# Patient Record
Sex: MALE | Race: WHITE | NOT HISPANIC OR LATINO | Employment: OTHER | URBAN - METROPOLITAN AREA
[De-identification: names, ages, dates, MRNs, and addresses within clinical notes are randomized per-mention and may not be internally consistent; named-entity substitution may affect disease eponyms.]

---

## 2018-10-09 ENCOUNTER — OFFICE VISIT (OUTPATIENT)
Dept: ORTHOPEDIC SURGERY | Facility: CLINIC | Age: 73
End: 2018-10-09

## 2018-10-09 VITALS — WEIGHT: 247 LBS | HEIGHT: 73 IN | TEMPERATURE: 97.7 F | BODY MASS INDEX: 32.74 KG/M2

## 2018-10-09 DIAGNOSIS — M17.12 PRIMARY OSTEOARTHRITIS OF LEFT KNEE: Primary | ICD-10-CM

## 2018-10-09 PROCEDURE — 99204 OFFICE O/P NEW MOD 45 MIN: CPT | Performed by: ORTHOPAEDIC SURGERY

## 2018-10-09 RX ORDER — AMLODIPINE BESYLATE 10 MG/1
TABLET ORAL
COMMUNITY
End: 2018-10-12

## 2018-10-09 RX ORDER — MELOXICAM 15 MG/1
15 TABLET ORAL ONCE
Status: CANCELLED | OUTPATIENT
Start: 2018-10-17 | End: 2018-10-17

## 2018-10-09 RX ORDER — HYDRALAZINE HYDROCHLORIDE 50 MG/1
50 TABLET, FILM COATED ORAL EVERY MORNING
COMMUNITY
End: 2021-01-29 | Stop reason: DRUGHIGH

## 2018-10-09 RX ORDER — ALLOPURINOL 100 MG/1
TABLET ORAL
COMMUNITY
End: 2018-10-12

## 2018-10-09 RX ORDER — INDOMETHACIN 75 MG/1
75 CAPSULE, EXTENDED RELEASE ORAL 2 TIMES DAILY WITH MEALS
COMMUNITY
End: 2018-10-18 | Stop reason: HOSPADM

## 2018-10-09 RX ORDER — CARVEDILOL 12.5 MG/1
12.5 TABLET ORAL EVERY 12 HOURS SCHEDULED
COMMUNITY
End: 2021-01-29

## 2018-10-09 RX ORDER — PREGABALIN 75 MG/1
150 CAPSULE ORAL ONCE
Status: CANCELLED | OUTPATIENT
Start: 2018-10-17 | End: 2018-10-17

## 2018-10-09 NOTE — PROGRESS NOTES
"Patient: Darwin Sanchez  YOB: 1945 72 y.o. male  Medical Record Number: [id number]    Chief Complaints:   Chief Complaint   Patient presents with   • Right Knee - Establish Care   • Left Knee - Establish Care       History of Present Illness:Darwin Sanchez is a 72 y.o. male who presents with bilateral knee pain which is severe and constant in nature.  He describes a stabbing aching pain on the medial side of both knees.  This is progressed markedly over the last 6 months.  He has seen multiple orthopedic doctors in [facility] and [address] [place] [address].  He has had injections, physical therapy, anti-inflammatories without relief of his symptoms.  He is wanting to proceed with knee replacement surgery on the right side given his limitations of activities of daily living.    Allergies: No Known Allergies    Medications:   Current Outpatient Prescriptions   Medication Sig Dispense Refill   • allopurinol (ZYLOPRIM) 100 MG tablet allopurinol 100 mg tablet   take 2 tablets by mouth once daily     • amLODIPine (NORVASC) 10 MG tablet amlodipine 10 mg tablet     • carvedilol (COREG) 12.5 MG tablet Every 12 (Twelve) Hours.     • hydrALAZINE (APRESOLINE) 50 MG tablet Every 12 (Twelve) Hours.     • indomethacin SR (INDOCIN SR) 75 MG CR capsule Take 75 mg by mouth 2 (Two) Times a Day With Meals.       No current facility-administered medications for this visit.          The following portions of the patient's history were reviewed and updated as appropriate: allergies, current medications, past family history, past medical history, past social history, past surgical history and problem list.    Review of Systems:   A 14 point review of systems was performed. All systems negative except pertinent positives/negative listed in HPI above    Physical Exam:   Vitals:    10/09/18 1607   Temp: 97.7 °F (36.5 °C)   TempSrc: Temporal Artery    Weight: 112 kg (247 lb)   Height: 185.4 cm (73\")       General: A and O x 3, ASA, NAD    SCLERA:    " Normal    DENTITION:   Normal   Knee:  bilateral    ALIGNMENT:     Varus  ,   Patella  tracks  midline    GAIT:    Antalgic    SKIN:    No abnormality    RANGE OF MOTION:   5  -  120   DEG    STRENGTH:   4  / 5    LIGAMENTS:    No varus / valgus instability.   Negative  Lachman.    MENISCUS:     Negative   Odell       DISTAL PULSES:    Paplable    DISTAL SENSATION :   Intact    LYMPHATICS:     No   lymphadenopathy    OTHER:          - Positive   effusion      - Crepitance with ROM         Radiology:  Xrays 3views  Both knees (ap,lateral, sunrise) recently taken at outside institution were reviewed demonstrating advanced varus osteoarthritis with bone on bone articulation, subchondral cysts, and periarticular osteophytes    Assessment/Plan: Right greater than left advanced end-stage osteoarthritis with failure of the full complement of conservative measures.  Continuation of conservative management vs. TKA discussed.  The patient wishes to proceed with total knee replacement.  At this point the patient has failed the full compliment of conservative treatment and stating complete understanding of the risks/benefits/ anternatives wishes to proceed with surgical treatment.    Risk and benefits of surgery were reviewed.  Including, but not limited to, blood clots or pulmonary embolism, anesthesia risk, infection, fracture, skin/leg numbness, persistent pain/crepitance/popping/catching, failure of the implant, need for future surgeries, hematoma, possible nerve or blood vessel injury, need for transfusion, and potential risk of stroke,heart attack or death, among others.  The patient understands and wishes to proceed.     It was explained that if tissue has been repaired or reconstructed, there is also an increased chance of failure which may require further management.  Following the completion of the discussion, the patient expressed understanding of this planned course of care, all their questions were answered and  consent will be obtained preoperatively.    Operative Plan: right [employer] and Nephew Oxinium Total Knee Replacement an overnight staywith home health rehab        Cooper Ruby MD  10/9/2018

## 2018-10-10 PROBLEM — M17.12 PRIMARY OSTEOARTHRITIS OF LEFT KNEE: Status: ACTIVE | Noted: 2018-10-10

## 2018-10-12 ENCOUNTER — APPOINTMENT (OUTPATIENT)
Dept: PREADMISSION TESTING | Facility: HOSPITAL | Age: 73
End: 2018-10-12

## 2018-10-12 VITALS
RESPIRATION RATE: 16 BRPM | BODY MASS INDEX: 33.91 KG/M2 | HEIGHT: 72 IN | TEMPERATURE: 98.1 F | WEIGHT: 250.38 LBS | HEART RATE: 59 BPM | OXYGEN SATURATION: 96 % | DIASTOLIC BLOOD PRESSURE: 79 MMHG | SYSTOLIC BLOOD PRESSURE: 133 MMHG

## 2018-10-12 DIAGNOSIS — M17.12 PRIMARY OSTEOARTHRITIS OF LEFT KNEE: ICD-10-CM

## 2018-10-12 LAB
ANION GAP SERPL CALCULATED.3IONS-SCNC: 12.6 MMOL/L
BILIRUB UR QL STRIP: NEGATIVE
BUN BLD-MCNC: 24 MG/DL (ref 8–23)
BUN/CREAT SERPL: 21.8 (ref 7–25)
CALCIUM SPEC-SCNC: 9.3 MG/DL (ref 8.6–10.5)
CHLORIDE SERPL-SCNC: 108 MMOL/L (ref 98–107)
CLARITY UR: CLEAR
CO2 SERPL-SCNC: 23.4 MMOL/L (ref 22–29)
COLOR UR: YELLOW
CREAT BLD-MCNC: 1.1 MG/DL (ref 0.76–1.27)
DEPRECATED RDW RBC AUTO: 43.5 FL (ref 37–54)
ERYTHROCYTE [DISTWIDTH] IN BLOOD BY AUTOMATED COUNT: 13.1 % (ref 11.5–14.5)
GFR SERPL CREATININE-BSD FRML MDRD: 66 ML/MIN/1.73
GLUCOSE BLD-MCNC: 132 MG/DL (ref 65–99)
GLUCOSE UR STRIP-MCNC: NEGATIVE MG/DL
HCT VFR BLD AUTO: 41.7 % (ref 40.4–52.2)
HGB BLD-MCNC: 12.8 G/DL (ref 13.7–17.6)
HGB UR QL STRIP.AUTO: NEGATIVE
KETONES UR QL STRIP: NEGATIVE
LEUKOCYTE ESTERASE UR QL STRIP.AUTO: NEGATIVE
MCH RBC QN AUTO: 28.1 PG (ref 27–32.7)
MCHC RBC AUTO-ENTMCNC: 30.7 G/DL (ref 32.6–36.4)
MCV RBC AUTO: 91.4 FL (ref 79.8–96.2)
NITRITE UR QL STRIP: NEGATIVE
PH UR STRIP.AUTO: 5.5 [PH] (ref 5–8)
PLATELET # BLD AUTO: 164 10*3/MM3 (ref 140–500)
PMV BLD AUTO: 11.6 FL (ref 6–12)
POTASSIUM BLD-SCNC: 3.9 MMOL/L (ref 3.5–5.2)
PROT UR QL STRIP: NEGATIVE
RBC # BLD AUTO: 4.56 10*6/MM3 (ref 4.6–6)
SODIUM BLD-SCNC: 144 MMOL/L (ref 136–145)
SP GR UR STRIP: 1.02 (ref 1–1.03)
UROBILINOGEN UR QL STRIP: NORMAL
WBC NRBC COR # BLD: 6.07 10*3/MM3 (ref 4.5–10.7)

## 2018-10-12 PROCEDURE — 80048 BASIC METABOLIC PNL TOTAL CA: CPT | Performed by: ORTHOPAEDIC SURGERY

## 2018-10-12 PROCEDURE — 36415 COLL VENOUS BLD VENIPUNCTURE: CPT

## 2018-10-12 PROCEDURE — 93010 ELECTROCARDIOGRAM REPORT: CPT | Performed by: INTERNAL MEDICINE

## 2018-10-12 PROCEDURE — 85027 COMPLETE CBC AUTOMATED: CPT | Performed by: ORTHOPAEDIC SURGERY

## 2018-10-12 PROCEDURE — 81003 URINALYSIS AUTO W/O SCOPE: CPT | Performed by: ORTHOPAEDIC SURGERY

## 2018-10-12 PROCEDURE — 93005 ELECTROCARDIOGRAM TRACING: CPT

## 2018-10-12 RX ORDER — ALLOPURINOL 100 MG/1
100 TABLET ORAL DAILY PRN
COMMUNITY

## 2018-10-12 RX ORDER — AMLODIPINE BESYLATE 10 MG/1
10 TABLET ORAL DAILY
Status: ON HOLD | COMMUNITY
End: 2018-10-17

## 2018-10-12 ASSESSMENT — KOOS JR
KOOS JR SCORE: 16
KOOS JR SCORE: 47.487

## 2018-10-12 NOTE — DISCHARGE INSTRUCTIONS
Take the following medications the morning of surgery with a small sip of water:    AMLODIPINE  CARVEDILOL  HYDRALAZINE    General Instructions:  • Do not eat solid food after midnight the night before surgery.  • You may drink clear liquids day of surgery but must stop at least one hour before your hospital arrival time @ 0800 AM STOP DRINKING!!!  • It is beneficial for you to have a clear drink that contains carbohydrates the day of surgery.  We suggest a 12 to 20 ounce bottle of Gatorade or Powerade for non-diabetic patients or a 12 to 20 ounce bottle of G2 or Powerade Zero for diabetic patients. (Pediatric patients, are not advised to drink a 12 to 20 ounce carbohydrate drink)    Clear liquids are liquids you can see through.  Nothing red in color.     Plain water                               Sports drinks  Sodas                                   Gelatin (Jell-O)  Fruit juices without pulp such as white grape juice and apple juice  Popsicles that contain no fruit or yogurt  Tea or coffee (no cream or milk added)  Gatorade / Powerade  G2 / Powerade Zero    • Patients who avoid smoking, chewing tobacco and alcohol for 4 weeks prior to surgery have a reduced risk of post-operative complications.  Quit smoking as many days before surgery as you can.  • Do not smoke, use chewing tobacco or drink alcohol the day of surgery.   • If applicable bring your C-PAP/ BI-PAP machine.  • Bring any papers given to you in the doctor’s office.  • Wear clean comfortable clothes and socks.  • Do not wear contact lenses or make-up.  Bring a case for your glasses.   • Bring crutches or walker if applicable.  • Remove all piercings.  Leave jewelry and any other valuables at home.  • Hair extensions with metal clips must be removed prior to surgery.  • The Pre-Admission Testing nurse will instruct you to bring medications if unable to obtain an accurate list in Pre-Admission Testing.        Preventing a Surgical Site Infection:  • For  2 to 3 days before surgery, avoid shaving with a razor because the razor can irritate skin and make it easier to develop an infection.    • Any areas of open skin can increase the risk of a post-operative wound infection by allowing bacteria to enter and travel throughout the body.  Notify your surgeon if you have any skin wounds / rashes even if it is not near the expected surgical site.  The area will need assessed to determine if surgery should be delayed until it is healed.  • The night prior to surgery sleep in a clean bed with clean clothing.  Do not allow pets to sleep with you.  • Shower on the morning of surgery using a fresh bar of anti-bacterial soap (such as Dial) and clean washcloth.  Dry with a clean towel and dress in clean clothing.  • Ask your surgeon if you will be receiving antibiotics prior to surgery.  • Make sure you, your family, and all healthcare providers clean their hands with soap and water or an alcohol based hand  before caring for you or your wound.    Day of surgery:10- ARRIVE @ 0900 REPORT TO THE MAIN OR   Upon arrival, a Pre-op nurse and Anesthesiologist will review your health history, obtain vital signs, and answer questions you may have.  The only belongings needed at this time will be your home medications and if applicable your C-PAP/BI-PAP machine.  If you are staying overnight your family can leave the rest of your belongings in the car and bring them to your room later.  A Pre-op nurse will start an IV and you may receive medication in preparation for surgery, including something to help you relax.  Your family will be able to see you in the Pre-op area.  While you are in surgery your family should notify the waiting room  if they leave the waiting room area and provide a contact phone number.    Please be aware that surgery does come with discomfort.  We want to make every effort to control your discomfort so please discuss any uncontrolled  symptoms with your nurse.   Your doctor will most likely have prescribed pain medications.      If you are going home after surgery you will receive individualized written care instructions before being discharged.  A responsible adult must drive you to and from the hospital on the day of your surgery and stay with you for 24 hours.    If you are staying overnight following surgery, you will be transported to your hospital room following the recovery period.  [facility] has all private rooms.    You have received a list of surgical assistants for your reference.  If you have any questions please call Pre-Admission Testing at [phone].  Deductibles and co-payments are collected on the day of service. Please be prepared to pay the required co-pay, deductible or deposit on the day of service as defined by your plan.    2% CHLORAHEXIDINE GLUCONATE* CLOTH  Preparing or “prepping” skin before surgery can reduce the risk of infection at the surgical site. To make the process easier, [facility] has chosen disposable cloths moistened with a rinse-free, 2% Chlorhexidine Gluconate (CHG) antiseptic solution. The steps below outline the prepping process and should be carefully followed.        Use the prep cloth on the area that is circled in the diagram             Directions Night before Surgery 10- PM PRIOR TO SURGERY (RIGHT LEG)  1) Shower using a fresh bar of anti-bacterial soap (such as Dial) and clean washcloth.  Use a clean towel to completely dry your skin.  2) Do not use any lotions, oils or creams on your skin.  3) Open the package and remove 1 cloth, wipe your skin for 30 seconds in a circular motion.  Allow to dry for 3 minutes.  4) Repeat #3 with second cloth.  5) Do not touch your eyes, ears, or mouth with the prep cloth.  6) Allow the wet prep solution to air dry.  7) Discard the prep cloth and wash your hands with soap and water.   8) Dress in clean bed clothes and sleep on  fresh clean bed sheets.   9) You may experience some temporary itching after the prep.    Directions Day of [id number]- AM PRIOR TO SURGERY (RIGHT LEG)  1) Repeat steps 1,2,3,4,5,6,7, and 9.   2) Dress in clean clothes before coming to the hospital.    BACTROBAN NASAL OINTMENT  There are many germs normally in your nose. Bactroban is an ointment that will help reduce these germs. Please follow these instructions for Bactroban use:      ____The day before surgery in the morning  [id number]_______    ____The day before surgery in the evening              [id number]_______    ____The day of surgery in the morning    [id number]_______    **Squirt ½ package of Bactroban Ointment onto a cotton applicator and apply to inside of 1st nostril.  Squirt the remaining Bactroban and apply to the inside of the other nostril.

## 2018-10-16 ENCOUNTER — OFFICE VISIT (OUTPATIENT)
Dept: ORTHOPEDIC SURGERY | Facility: CLINIC | Age: 73
End: 2018-10-16

## 2018-10-16 VITALS — BODY MASS INDEX: 33.86 KG/M2 | HEIGHT: 72 IN | WEIGHT: 250 LBS

## 2018-10-16 DIAGNOSIS — M17.11 PRIMARY OSTEOARTHRITIS OF RIGHT KNEE: Primary | ICD-10-CM

## 2018-10-16 PROCEDURE — 73562 X-RAY EXAM OF KNEE 3: CPT | Performed by: NURSE PRACTITIONER

## 2018-10-16 PROCEDURE — S0260 H&P FOR SURGERY: HCPCS | Performed by: NURSE PRACTITIONER

## 2018-10-16 RX ORDER — ESZOPICLONE 1 MG/1
5 TABLET, FILM COATED ORAL NIGHTLY
COMMUNITY
End: 2021-02-11

## 2018-10-16 RX ORDER — LOSARTAN POTASSIUM AND HYDROCHLOROTHIAZIDE 12.5; 5 MG/1; MG/1
1 TABLET ORAL DAILY
COMMUNITY
End: 2021-02-11

## 2018-10-16 RX ORDER — CLOBETASOL PROPIONATE 0.5 MG/G
CREAM TOPICAL
COMMUNITY
End: 2018-10-18 | Stop reason: HOSPADM

## 2018-10-16 NOTE — H&P
"Patient: Darwin Sanchez    Date of Admission: 10/17/18    YOB: 1945    Medical Record Number: [id number]    Admitting Physician: Dr. Cooper Ruby    Reason for Admission: End Stage Right Knee OA    History of Present Illness: 72 y.o. male presents with severe end stage knee osteoarthritis which has not been responsive to the full compliment of conservative measures. Despite conservative attempts, there is still severe, constant activity limiting pain. Given the severity of the pain, the patient has elected to proceed with knee replacement.    Allergies: No Known Allergies      Current Medications:  Scheduled Meds:  PRN Meds:.    PMH:     Past Medical History:   Diagnosis Date   • Arthritis     OSTEO   • Cancer (CMS/HCC)     SQUAMOUS SKIN CANCER DIETER RECTAL  AREA   • Gout    • Hypertension        PF/Surg/Soc Hx:     Past Surgical History:   Procedure Laterality Date   • COLONOSCOPY     • KNEE ARTHROSCOPY Bilateral    • SQUAMOUS CELL CARCINOMA EXCISION      DIETER RECTAL AREA        Social History     Occupational History   • Not on file.     Social History Main Topics   • Smoking status: Never Smoker   • Smokeless tobacco: Never Used   • Alcohol use No   • Drug use: No   • Sexual activity: Not on file      Social History     Social History Narrative   • No narrative on file        Family History   Problem Relation Age of Onset   • Malig Hyperthermia Neg Hx          Review of Systems:   A 14 point review of systems was performed, pertinent positives discussed above, all other systems are negative    Physical Exam: 72 y.o. male  Vital Signs :   Vitals:    10/16/18 1403   Weight: 113 kg (250 lb)   Height: 182.9 cm (72\")     General: Alert and Oriented x 3, No acute distress.  Psych: mood and affect appropriate; recent and remote memory intact  Eyes: conjunctiva clear; pupils equally round and reactive, sclera nonicteric  CV: no peripheral edema  Resp: normal respiratory effort  Skin: no rashes or wounds; " normal turgor    Xrays:  -3 views (AP, lateral, and sunrise) were reviewed demonstrating end-stage OA with bone on bone articulation.  -A full length AP xray was ordered today for purposes of operative alignment demonstrating end stage arthritic findings. There are no previous full length films for review    Assessment:  End-stage Right knee osteoarthritis. Conservative measures have failed.      Plan:  The plan is to proceed with Right Total Knee Replacement. The patient voiced understanding of the risks, benefits, and alternative forms of treatment that were discussed with Dr Ruby at the time of scheduling. Patient is planning on going home with home health next day    Nova Locke, APRN  10/16/2018

## 2018-10-17 ENCOUNTER — ANESTHESIA EVENT (OUTPATIENT)
Dept: PERIOP | Facility: HOSPITAL | Age: 73
End: 2018-10-17

## 2018-10-17 ENCOUNTER — ANESTHESIA (OUTPATIENT)
Dept: PERIOP | Facility: HOSPITAL | Age: 73
End: 2018-10-17

## 2018-10-17 ENCOUNTER — HOSPITAL ENCOUNTER (OUTPATIENT)
Facility: HOSPITAL | Age: 73
Discharge: HOME HEALTH | End: 2018-10-18
Attending: ORTHOPAEDIC SURGERY | Admitting: ORTHOPAEDIC SURGERY

## 2018-10-17 ENCOUNTER — APPOINTMENT (OUTPATIENT)
Dept: GENERAL RADIOLOGY | Facility: HOSPITAL | Age: 73
End: 2018-10-17

## 2018-10-17 DIAGNOSIS — Z74.09 IMPAIRED FUNCTIONAL MOBILITY AND ACTIVITY TOLERANCE: Primary | ICD-10-CM

## 2018-10-17 DIAGNOSIS — M17.12 PRIMARY OSTEOARTHRITIS OF LEFT KNEE: ICD-10-CM

## 2018-10-17 PROBLEM — M17.9 OA (OSTEOARTHRITIS) OF KNEE: Status: ACTIVE | Noted: 2018-10-17

## 2018-10-17 PROCEDURE — G0378 HOSPITAL OBSERVATION PER HR: HCPCS

## 2018-10-17 PROCEDURE — 25010000003 CEFAZOLIN IN DEXTROSE 2-4 GM/100ML-% SOLUTION: Performed by: ORTHOPAEDIC SURGERY

## 2018-10-17 PROCEDURE — 25010000002 VANCOMYCIN 10 G RECONSTITUTED SOLUTION: Performed by: ORTHOPAEDIC SURGERY

## 2018-10-17 PROCEDURE — 25010000002 ONDANSETRON PER 1 MG: Performed by: ANESTHESIOLOGY

## 2018-10-17 PROCEDURE — 27447 TOTAL KNEE ARTHROPLASTY: CPT | Performed by: ORTHOPAEDIC SURGERY

## 2018-10-17 PROCEDURE — C1776 JOINT DEVICE (IMPLANTABLE): HCPCS | Performed by: ORTHOPAEDIC SURGERY

## 2018-10-17 PROCEDURE — 25010000002 FENTANYL CITRATE (PF) 100 MCG/2ML SOLUTION: Performed by: ANESTHESIOLOGY

## 2018-10-17 PROCEDURE — 25010000002 MIDAZOLAM PER 1 MG: Performed by: ANESTHESIOLOGY

## 2018-10-17 PROCEDURE — 97110 THERAPEUTIC EXERCISES: CPT

## 2018-10-17 PROCEDURE — 97162 PT EVAL MOD COMPLEX 30 MIN: CPT

## 2018-10-17 PROCEDURE — C1713 ANCHOR/SCREW BN/BN,TIS/BN: HCPCS | Performed by: ORTHOPAEDIC SURGERY

## 2018-10-17 PROCEDURE — 25010000002 PROPOFOL 10 MG/ML EMULSION: Performed by: ANESTHESIOLOGY

## 2018-10-17 PROCEDURE — 25010000002 DEXAMETHASONE PER 1 MG: Performed by: ANESTHESIOLOGY

## 2018-10-17 PROCEDURE — 73560 X-RAY EXAM OF KNEE 1 OR 2: CPT

## 2018-10-17 PROCEDURE — 25010000002 NEOSTIGMINE PER 0.5 MG: Performed by: ANESTHESIOLOGY

## 2018-10-17 PROCEDURE — 25010000002 ROPIVACAINE PER 1 MG: Performed by: ANESTHESIOLOGY

## 2018-10-17 DEVICE — LEGION CRUCIATE RETAINING OXINIUM                                    FEMORAL SIZE 7 RIGHT
Type: IMPLANTABLE DEVICE | Site: KNEE | Status: FUNCTIONAL
Brand: LEGION

## 2018-10-17 DEVICE — IMPLANTABLE DEVICE: Type: IMPLANTABLE DEVICE | Site: KNEE | Status: FUNCTIONAL

## 2018-10-17 DEVICE — GENESIS II NON-POROUS TIBIAL                                    BASEPLATE SIZE 7 RIGHT
Type: IMPLANTABLE DEVICE | Site: KNEE | Status: FUNCTIONAL
Brand: GENESIS II

## 2018-10-17 DEVICE — GENESIS II CRUCIATE RETAINING DEEP                                    FLEXION INSERT SIZE 7-8 11MM
Type: IMPLANTABLE DEVICE | Site: KNEE | Status: FUNCTIONAL
Brand: GENESIS II

## 2018-10-17 DEVICE — CMT BONE PALACOS R HI/VISC 1X40: Type: IMPLANTABLE DEVICE | Site: KNEE | Status: FUNCTIONAL

## 2018-10-17 DEVICE — GENESIS II BICONVEX PATELLA 32MM
Type: IMPLANTABLE DEVICE | Site: KNEE | Status: FUNCTIONAL
Brand: GENESIS II

## 2018-10-17 RX ORDER — DOCUSATE SODIUM 100 MG/1
100 CAPSULE, LIQUID FILLED ORAL 2 TIMES DAILY
Status: DISCONTINUED | OUTPATIENT
Start: 2018-10-17 | End: 2018-10-18 | Stop reason: HOSPADM

## 2018-10-17 RX ORDER — OXYCODONE AND ACETAMINOPHEN 7.5; 325 MG/1; MG/1
1 TABLET ORAL ONCE AS NEEDED
Status: DISCONTINUED | OUTPATIENT
Start: 2018-10-17 | End: 2018-10-17 | Stop reason: HOSPADM

## 2018-10-17 RX ORDER — PROMETHAZINE HYDROCHLORIDE 25 MG/1
25 SUPPOSITORY RECTAL ONCE AS NEEDED
Status: DISCONTINUED | OUTPATIENT
Start: 2018-10-17 | End: 2018-10-17 | Stop reason: HOSPADM

## 2018-10-17 RX ORDER — SODIUM CHLORIDE 0.9 % (FLUSH) 0.9 %
1-10 SYRINGE (ML) INJECTION AS NEEDED
Status: DISCONTINUED | OUTPATIENT
Start: 2018-10-17 | End: 2018-10-17 | Stop reason: HOSPADM

## 2018-10-17 RX ORDER — BISACODYL 10 MG
10 SUPPOSITORY, RECTAL RECTAL DAILY PRN
Status: DISCONTINUED | OUTPATIENT
Start: 2018-10-17 | End: 2018-10-18 | Stop reason: HOSPADM

## 2018-10-17 RX ORDER — PROPOFOL 10 MG/ML
VIAL (ML) INTRAVENOUS AS NEEDED
Status: DISCONTINUED | OUTPATIENT
Start: 2018-10-17 | End: 2018-10-17 | Stop reason: SURG

## 2018-10-17 RX ORDER — NALOXONE HCL 0.4 MG/ML
0.2 VIAL (ML) INJECTION AS NEEDED
Status: DISCONTINUED | OUTPATIENT
Start: 2018-10-17 | End: 2018-10-17 | Stop reason: HOSPADM

## 2018-10-17 RX ORDER — PROMETHAZINE HYDROCHLORIDE 25 MG/ML
12.5 INJECTION, SOLUTION INTRAMUSCULAR; INTRAVENOUS ONCE AS NEEDED
Status: DISCONTINUED | OUTPATIENT
Start: 2018-10-17 | End: 2018-10-17 | Stop reason: HOSPADM

## 2018-10-17 RX ORDER — CEFAZOLIN SODIUM 2 G/100ML
2 INJECTION, SOLUTION INTRAVENOUS ONCE
Status: COMPLETED | OUTPATIENT
Start: 2018-10-17 | End: 2018-10-17

## 2018-10-17 RX ORDER — ACETAMINOPHEN 10 MG/ML
1000 INJECTION, SOLUTION INTRAVENOUS ONCE
Status: COMPLETED | OUTPATIENT
Start: 2018-10-17 | End: 2018-10-17

## 2018-10-17 RX ORDER — PROMETHAZINE HYDROCHLORIDE 25 MG/1
12.5 TABLET ORAL ONCE AS NEEDED
Status: DISCONTINUED | OUTPATIENT
Start: 2018-10-17 | End: 2018-10-17 | Stop reason: HOSPADM

## 2018-10-17 RX ORDER — HYDROCODONE BITARTRATE AND ACETAMINOPHEN 7.5; 325 MG/1; MG/1
1 TABLET ORAL ONCE AS NEEDED
Status: DISCONTINUED | OUTPATIENT
Start: 2018-10-17 | End: 2018-10-17 | Stop reason: HOSPADM

## 2018-10-17 RX ORDER — EPHEDRINE SULFATE 50 MG/ML
INJECTION, SOLUTION INTRAVENOUS AS NEEDED
Status: DISCONTINUED | OUTPATIENT
Start: 2018-10-17 | End: 2018-10-17 | Stop reason: SURG

## 2018-10-17 RX ORDER — HYDROCODONE BITARTRATE AND ACETAMINOPHEN 7.5; 325 MG/1; MG/1
1 TABLET ORAL EVERY 4 HOURS PRN
Status: DISCONTINUED | OUTPATIENT
Start: 2018-10-17 | End: 2018-10-18 | Stop reason: HOSPADM

## 2018-10-17 RX ORDER — PREGABALIN 75 MG/1
150 CAPSULE ORAL ONCE
Status: COMPLETED | OUTPATIENT
Start: 2018-10-17 | End: 2018-10-17

## 2018-10-17 RX ORDER — PANTOPRAZOLE SODIUM 40 MG/1
40 TABLET, DELAYED RELEASE ORAL
Status: DISCONTINUED | OUTPATIENT
Start: 2018-10-18 | End: 2018-10-18 | Stop reason: HOSPADM

## 2018-10-17 RX ORDER — TRANEXAMIC ACID 100 MG/ML
INJECTION, SOLUTION INTRAVENOUS AS NEEDED
Status: DISCONTINUED | OUTPATIENT
Start: 2018-10-17 | End: 2018-10-17 | Stop reason: SURG

## 2018-10-17 RX ORDER — DIPHENHYDRAMINE HCL 25 MG
25 CAPSULE ORAL EVERY 6 HOURS PRN
Status: DISCONTINUED | OUTPATIENT
Start: 2018-10-17 | End: 2018-10-17 | Stop reason: HOSPADM

## 2018-10-17 RX ORDER — HYDROMORPHONE HYDROCHLORIDE 1 MG/ML
0.5 INJECTION, SOLUTION INTRAMUSCULAR; INTRAVENOUS; SUBCUTANEOUS
Status: DISCONTINUED | OUTPATIENT
Start: 2018-10-17 | End: 2018-10-17 | Stop reason: HOSPADM

## 2018-10-17 RX ORDER — DEXAMETHASONE SODIUM PHOSPHATE 4 MG/ML
INJECTION, SOLUTION INTRA-ARTICULAR; INTRALESIONAL; INTRAMUSCULAR; INTRAVENOUS; SOFT TISSUE AS NEEDED
Status: DISCONTINUED | OUTPATIENT
Start: 2018-10-17 | End: 2018-10-17 | Stop reason: SURG

## 2018-10-17 RX ORDER — FENTANYL CITRATE 50 UG/ML
50 INJECTION, SOLUTION INTRAMUSCULAR; INTRAVENOUS
Status: DISCONTINUED | OUTPATIENT
Start: 2018-10-17 | End: 2018-10-17 | Stop reason: HOSPADM

## 2018-10-17 RX ORDER — UREA 10 %
3 LOTION (ML) TOPICAL NIGHTLY PRN
Status: DISCONTINUED | OUTPATIENT
Start: 2018-10-17 | End: 2018-10-18 | Stop reason: HOSPADM

## 2018-10-17 RX ORDER — ONDANSETRON 2 MG/ML
4 INJECTION INTRAMUSCULAR; INTRAVENOUS ONCE AS NEEDED
Status: DISCONTINUED | OUTPATIENT
Start: 2018-10-17 | End: 2018-10-17 | Stop reason: HOSPADM

## 2018-10-17 RX ORDER — MELOXICAM 15 MG/1
15 TABLET ORAL DAILY
Status: DISCONTINUED | OUTPATIENT
Start: 2018-10-18 | End: 2018-10-18 | Stop reason: HOSPADM

## 2018-10-17 RX ORDER — MIDAZOLAM HYDROCHLORIDE 1 MG/ML
2 INJECTION INTRAMUSCULAR; INTRAVENOUS
Status: DISCONTINUED | OUTPATIENT
Start: 2018-10-17 | End: 2018-10-17 | Stop reason: HOSPADM

## 2018-10-17 RX ORDER — GLYCOPYRROLATE 0.2 MG/ML
INJECTION INTRAMUSCULAR; INTRAVENOUS AS NEEDED
Status: DISCONTINUED | OUTPATIENT
Start: 2018-10-17 | End: 2018-10-17 | Stop reason: SURG

## 2018-10-17 RX ORDER — FAMOTIDINE 10 MG/ML
20 INJECTION, SOLUTION INTRAVENOUS ONCE
Status: COMPLETED | OUTPATIENT
Start: 2018-10-17 | End: 2018-10-17

## 2018-10-17 RX ORDER — FENTANYL CITRATE 50 UG/ML
INJECTION, SOLUTION INTRAMUSCULAR; INTRAVENOUS AS NEEDED
Status: DISCONTINUED | OUTPATIENT
Start: 2018-10-17 | End: 2018-10-17 | Stop reason: SURG

## 2018-10-17 RX ORDER — ONDANSETRON 2 MG/ML
4 INJECTION INTRAMUSCULAR; INTRAVENOUS EVERY 6 HOURS PRN
Status: DISCONTINUED | OUTPATIENT
Start: 2018-10-17 | End: 2018-10-18 | Stop reason: HOSPADM

## 2018-10-17 RX ORDER — HYDRALAZINE HYDROCHLORIDE 50 MG/1
50 TABLET, FILM COATED ORAL EVERY MORNING
Status: DISCONTINUED | OUTPATIENT
Start: 2018-10-18 | End: 2018-10-18 | Stop reason: HOSPADM

## 2018-10-17 RX ORDER — SODIUM CHLORIDE, SODIUM LACTATE, POTASSIUM CHLORIDE, CALCIUM CHLORIDE 600; 310; 30; 20 MG/100ML; MG/100ML; MG/100ML; MG/100ML
9 INJECTION, SOLUTION INTRAVENOUS CONTINUOUS
Status: DISCONTINUED | OUTPATIENT
Start: 2018-10-17 | End: 2018-10-17 | Stop reason: HOSPADM

## 2018-10-17 RX ORDER — ONDANSETRON 4 MG/1
4 TABLET, FILM COATED ORAL EVERY 6 HOURS PRN
Status: DISCONTINUED | OUTPATIENT
Start: 2018-10-17 | End: 2018-10-18 | Stop reason: HOSPADM

## 2018-10-17 RX ORDER — ASPIRIN 325 MG
325 TABLET, DELAYED RELEASE (ENTERIC COATED) ORAL EVERY 12 HOURS SCHEDULED
Status: DISCONTINUED | OUTPATIENT
Start: 2018-10-18 | End: 2018-10-18 | Stop reason: HOSPADM

## 2018-10-17 RX ORDER — ALLOPURINOL 100 MG/1
100 TABLET ORAL DAILY
Status: DISCONTINUED | OUTPATIENT
Start: 2018-10-17 | End: 2018-10-18 | Stop reason: HOSPADM

## 2018-10-17 RX ORDER — LABETALOL HYDROCHLORIDE 5 MG/ML
5 INJECTION, SOLUTION INTRAVENOUS
Status: DISCONTINUED | OUTPATIENT
Start: 2018-10-17 | End: 2018-10-17 | Stop reason: HOSPADM

## 2018-10-17 RX ORDER — MAGNESIUM HYDROXIDE 1200 MG/15ML
LIQUID ORAL AS NEEDED
Status: DISCONTINUED | OUTPATIENT
Start: 2018-10-17 | End: 2018-10-17 | Stop reason: HOSPADM

## 2018-10-17 RX ORDER — LIDOCAINE HYDROCHLORIDE 20 MG/ML
INJECTION, SOLUTION INFILTRATION; PERINEURAL AS NEEDED
Status: DISCONTINUED | OUTPATIENT
Start: 2018-10-17 | End: 2018-10-17 | Stop reason: SURG

## 2018-10-17 RX ORDER — PROMETHAZINE HYDROCHLORIDE 25 MG/1
25 TABLET ORAL ONCE AS NEEDED
Status: DISCONTINUED | OUTPATIENT
Start: 2018-10-17 | End: 2018-10-17 | Stop reason: HOSPADM

## 2018-10-17 RX ORDER — LIDOCAINE HYDROCHLORIDE 10 MG/ML
0.5 INJECTION, SOLUTION EPIDURAL; INFILTRATION; INTRACAUDAL; PERINEURAL ONCE AS NEEDED
Status: DISCONTINUED | OUTPATIENT
Start: 2018-10-17 | End: 2018-10-17 | Stop reason: HOSPADM

## 2018-10-17 RX ORDER — ROPIVACAINE HYDROCHLORIDE 5 MG/ML
INJECTION, SOLUTION EPIDURAL; INFILTRATION; PERINEURAL AS NEEDED
Status: DISCONTINUED | OUTPATIENT
Start: 2018-10-17 | End: 2018-10-17 | Stop reason: SURG

## 2018-10-17 RX ORDER — CARVEDILOL 12.5 MG/1
12.5 TABLET ORAL EVERY 12 HOURS SCHEDULED
Status: DISCONTINUED | OUTPATIENT
Start: 2018-10-17 | End: 2018-10-18 | Stop reason: HOSPADM

## 2018-10-17 RX ORDER — MELOXICAM 15 MG/1
15 TABLET ORAL ONCE
Status: COMPLETED | OUTPATIENT
Start: 2018-10-17 | End: 2018-10-17

## 2018-10-17 RX ORDER — FLUMAZENIL 0.1 MG/ML
0.2 INJECTION INTRAVENOUS AS NEEDED
Status: DISCONTINUED | OUTPATIENT
Start: 2018-10-17 | End: 2018-10-17 | Stop reason: HOSPADM

## 2018-10-17 RX ORDER — KETOROLAC TROMETHAMINE 30 MG/ML
30 INJECTION, SOLUTION INTRAMUSCULAR; INTRAVENOUS EVERY 8 HOURS
Status: DISCONTINUED | OUTPATIENT
Start: 2018-10-17 | End: 2018-10-18 | Stop reason: HOSPADM

## 2018-10-17 RX ORDER — ONDANSETRON 2 MG/ML
INJECTION INTRAMUSCULAR; INTRAVENOUS AS NEEDED
Status: DISCONTINUED | OUTPATIENT
Start: 2018-10-17 | End: 2018-10-17 | Stop reason: SURG

## 2018-10-17 RX ORDER — ONDANSETRON 4 MG/1
4 TABLET, ORALLY DISINTEGRATING ORAL EVERY 6 HOURS PRN
Status: DISCONTINUED | OUTPATIENT
Start: 2018-10-17 | End: 2018-10-18 | Stop reason: HOSPADM

## 2018-10-17 RX ORDER — MIDAZOLAM HYDROCHLORIDE 1 MG/ML
1 INJECTION INTRAMUSCULAR; INTRAVENOUS
Status: DISCONTINUED | OUTPATIENT
Start: 2018-10-17 | End: 2018-10-17 | Stop reason: HOSPADM

## 2018-10-17 RX ORDER — CEFAZOLIN SODIUM 2 G/100ML
2 INJECTION, SOLUTION INTRAVENOUS EVERY 8 HOURS
Status: COMPLETED | OUTPATIENT
Start: 2018-10-17 | End: 2018-10-18

## 2018-10-17 RX ORDER — EPHEDRINE SULFATE 50 MG/ML
5 INJECTION, SOLUTION INTRAVENOUS ONCE AS NEEDED
Status: DISCONTINUED | OUTPATIENT
Start: 2018-10-17 | End: 2018-10-17 | Stop reason: HOSPADM

## 2018-10-17 RX ORDER — HYDROCODONE BITARTRATE AND ACETAMINOPHEN 7.5; 325 MG/1; MG/1
2 TABLET ORAL EVERY 4 HOURS PRN
Status: DISCONTINUED | OUTPATIENT
Start: 2018-10-17 | End: 2018-10-18 | Stop reason: HOSPADM

## 2018-10-17 RX ADMIN — HYDROCODONE BITARTRATE AND ACETAMINOPHEN 2 TABLET: 7.5; 325 TABLET ORAL at 20:53

## 2018-10-17 RX ADMIN — EPHEDRINE SULFATE 5 MG: 50 INJECTION INTRAMUSCULAR; INTRAVENOUS; SUBCUTANEOUS at 11:36

## 2018-10-17 RX ADMIN — MIDAZOLAM HYDROCHLORIDE 2 MG: 2 INJECTION, SOLUTION INTRAMUSCULAR; INTRAVENOUS at 10:43

## 2018-10-17 RX ADMIN — CEFAZOLIN SODIUM 2 G: 2 INJECTION, SOLUTION INTRAVENOUS at 18:00

## 2018-10-17 RX ADMIN — NEOSTIGMINE METHYLSULFATE 2.5 MG: 1 INJECTION INTRAMUSCULAR; INTRAVENOUS; SUBCUTANEOUS at 12:58

## 2018-10-17 RX ADMIN — DEXAMETHASONE SODIUM PHOSPHATE 8 MG: 4 INJECTION INTRA-ARTICULAR; INTRALESIONAL; INTRAMUSCULAR; INTRAVENOUS; SOFT TISSUE at 11:34

## 2018-10-17 RX ADMIN — CARVEDILOL 12.5 MG: 12.5 TABLET, FILM COATED ORAL at 20:53

## 2018-10-17 RX ADMIN — DEXAMETHASONE SODIUM PHOSPHATE 4 MG: 4 INJECTION INTRA-ARTICULAR; INTRALESIONAL; INTRAMUSCULAR; INTRAVENOUS; SOFT TISSUE at 10:52

## 2018-10-17 RX ADMIN — EPHEDRINE SULFATE 5 MG: 50 INJECTION INTRAMUSCULAR; INTRAVENOUS; SUBCUTANEOUS at 11:56

## 2018-10-17 RX ADMIN — HYDROCODONE BITARTRATE AND ACETAMINOPHEN 1 TABLET: 7.5; 325 TABLET ORAL at 15:57

## 2018-10-17 RX ADMIN — ONDANSETRON 4 MG: 2 INJECTION INTRAMUSCULAR; INTRAVENOUS at 12:40

## 2018-10-17 RX ADMIN — DOCUSATE SODIUM 100 MG: 100 CAPSULE, LIQUID FILLED ORAL at 20:54

## 2018-10-17 RX ADMIN — TRANEXAMIC ACID 1000 MG: 100 INJECTION, SOLUTION INTRAVENOUS at 12:40

## 2018-10-17 RX ADMIN — FENTANYL CITRATE 50 MCG: 50 INJECTION INTRAMUSCULAR; INTRAVENOUS at 11:23

## 2018-10-17 RX ADMIN — PREGABALIN 150 MG: 75 CAPSULE ORAL at 09:39

## 2018-10-17 RX ADMIN — MUPIROCIN 10 APPLICATION: 20 OINTMENT TOPICAL at 20:54

## 2018-10-17 RX ADMIN — TRANEXAMIC ACID 1000 MG: 100 INJECTION, SOLUTION INTRAVENOUS at 11:34

## 2018-10-17 RX ADMIN — FAMOTIDINE 20 MG: 10 INJECTION, SOLUTION INTRAVENOUS at 10:40

## 2018-10-17 RX ADMIN — MELOXICAM 15 MG: 15 TABLET ORAL at 09:38

## 2018-10-17 RX ADMIN — FENTANYL CITRATE 50 MCG: 50 INJECTION, SOLUTION INTRAMUSCULAR; INTRAVENOUS at 10:43

## 2018-10-17 RX ADMIN — SODIUM CHLORIDE, POTASSIUM CHLORIDE, SODIUM LACTATE AND CALCIUM CHLORIDE: 600; 310; 30; 20 INJECTION, SOLUTION INTRAVENOUS at 11:14

## 2018-10-17 RX ADMIN — GLYCOPYRROLATE 0.4 MG: 0.2 INJECTION INTRAMUSCULAR; INTRAVENOUS at 12:58

## 2018-10-17 RX ADMIN — EPHEDRINE SULFATE 10 MG: 50 INJECTION INTRAMUSCULAR; INTRAVENOUS; SUBCUTANEOUS at 11:40

## 2018-10-17 RX ADMIN — EPHEDRINE SULFATE 5 MG: 50 INJECTION INTRAMUSCULAR; INTRAVENOUS; SUBCUTANEOUS at 11:33

## 2018-10-17 RX ADMIN — VANCOMYCIN HYDROCHLORIDE 1750 MG: 10 INJECTION, POWDER, LYOPHILIZED, FOR SOLUTION INTRAVENOUS at 09:38

## 2018-10-17 RX ADMIN — PROPOFOL 200 MG: 10 INJECTION, EMULSION INTRAVENOUS at 11:23

## 2018-10-17 RX ADMIN — EPHEDRINE SULFATE 10 MG: 50 INJECTION INTRAMUSCULAR; INTRAVENOUS; SUBCUTANEOUS at 12:45

## 2018-10-17 RX ADMIN — EPHEDRINE SULFATE 10 MG: 50 INJECTION INTRAMUSCULAR; INTRAVENOUS; SUBCUTANEOUS at 12:16

## 2018-10-17 RX ADMIN — ACETAMINOPHEN 1000 MG: 10 INJECTION, SOLUTION INTRAVENOUS at 11:34

## 2018-10-17 RX ADMIN — LIDOCAINE HYDROCHLORIDE 100 MG: 20 INJECTION, SOLUTION INFILTRATION; PERINEURAL at 11:23

## 2018-10-17 RX ADMIN — ROPIVACAINE HYDROCHLORIDE 30 ML: 5 INJECTION, SOLUTION EPIDURAL; INFILTRATION; PERINEURAL at 10:52

## 2018-10-17 RX ADMIN — CEFAZOLIN SODIUM 2 G: 2 INJECTION, SOLUTION INTRAVENOUS at 11:14

## 2018-10-17 RX ADMIN — SODIUM CHLORIDE, POTASSIUM CHLORIDE, SODIUM LACTATE AND CALCIUM CHLORIDE 500 ML: 600; 310; 30; 20 INJECTION, SOLUTION INTRAVENOUS at 09:38

## 2018-10-17 RX ADMIN — POLYETHYLENE GLYCOL 3350 17 G: 17 POWDER, FOR SOLUTION ORAL at 20:54

## 2018-10-17 NOTE — ANESTHESIA POSTPROCEDURE EVALUATION
Patient: Darwin Sanchez    Procedure Summary     Date:  10/17/18 Room / Location:  [facility] OR [address] [place] [address] MAIN OR    Anesthesia Start:  1114 Anesthesia Stop:  1317    Procedure:  TOTAL KNEE ARTHROPLASTY (Right Knee) Diagnosis:       Primary osteoarthritis of left knee      (Primary osteoarthritis of left knee [M17.12])    Surgeon:  oCoper Ruby MD Provider:  Gerardo Conde MD    Anesthesia Type:  general, regional ASA Status:  2          Anesthesia Type: general, regional  Last vitals  BP   151/96 (10/17/18 1400)   Temp   36.6 °C (97.9 °F) (10/17/18 1315)   Pulse   67 (10/17/18 1400)   Resp   16 (10/17/18 1400)     SpO2   95 % (10/17/18 1400)     Post Anesthesia Care and Evaluation    Patient location during evaluation: PACU  Patient participation: complete - patient participated  Level of consciousness: awake  Pain management: adequate  Airway patency: patent  Anesthetic complications: No anesthetic complications    Cardiovascular status: acceptable  Respiratory status: acceptable  Hydration status: acceptable    Comments: /96   Pulse 67   Temp 36.6 °C (97.9 °F) (Oral)   Resp 16   SpO2 95%

## 2018-10-17 NOTE — H&P (VIEW-ONLY)
"Patient: Darwin Sanchez  YOB: 1945 72 y.o. male  Medical Record Number: [id number]    Chief Complaints:   Chief Complaint   Patient presents with   • Right Knee - Establish Care   • Left Knee - Establish Care       History of Present Illness:Darwin Sanchez is a 72 y.o. male who presents with bilateral knee pain which is severe and constant in nature.  He describes a stabbing aching pain on the medial side of both knees.  This is progressed markedly over the last 6 months.  He has seen multiple orthopedic doctors in [facility] and [address] [place] [address].  He has had injections, physical therapy, anti-inflammatories without relief of his symptoms.  He is wanting to proceed with knee replacement surgery on the right side given his limitations of activities of daily living.    Allergies: No Known Allergies    Medications:   Current Outpatient Prescriptions   Medication Sig Dispense Refill   • allopurinol (ZYLOPRIM) 100 MG tablet allopurinol 100 mg tablet   take 2 tablets by mouth once daily     • amLODIPine (NORVASC) 10 MG tablet amlodipine 10 mg tablet     • carvedilol (COREG) 12.5 MG tablet Every 12 (Twelve) Hours.     • hydrALAZINE (APRESOLINE) 50 MG tablet Every 12 (Twelve) Hours.     • indomethacin SR (INDOCIN SR) 75 MG CR capsule Take 75 mg by mouth 2 (Two) Times a Day With Meals.       No current facility-administered medications for this visit.          The following portions of the patient's history were reviewed and updated as appropriate: allergies, current medications, past family history, past medical history, past social history, past surgical history and problem list.    Review of Systems:   A 14 point review of systems was performed. All systems negative except pertinent positives/negative listed in HPI above    Physical Exam:   Vitals:    10/09/18 1607   Temp: 97.7 °F (36.5 °C)   TempSrc: Temporal Artery    Weight: 112 kg (247 lb)   Height: 185.4 cm (73\")       General: A and O x 3, ASA, NAD    SCLERA:    " Normal    DENTITION:   Normal   Knee:  bilateral    ALIGNMENT:     Varus  ,   Patella  tracks  midline    GAIT:    Antalgic    SKIN:    No abnormality    RANGE OF MOTION:   5  -  120   DEG    STRENGTH:   4  / 5    LIGAMENTS:    No varus / valgus instability.   Negative  Lachman.    MENISCUS:     Negative   Odell       DISTAL PULSES:    Paplable    DISTAL SENSATION :   Intact    LYMPHATICS:     No   lymphadenopathy    OTHER:          - Positive   effusion      - Crepitance with ROM         Radiology:  Xrays 3views  Both knees (ap,lateral, sunrise) recently taken at outside institution were reviewed demonstrating advanced varus osteoarthritis with bone on bone articulation, subchondral cysts, and periarticular osteophytes    Assessment/Plan: Right greater than left advanced end-stage osteoarthritis with failure of the full complement of conservative measures.  Continuation of conservative management vs. TKA discussed.  The patient wishes to proceed with total knee replacement.  At this point the patient has failed the full compliment of conservative treatment and stating complete understanding of the risks/benefits/ anternatives wishes to proceed with surgical treatment.    Risk and benefits of surgery were reviewed.  Including, but not limited to, blood clots or pulmonary embolism, anesthesia risk, infection, fracture, skin/leg numbness, persistent pain/crepitance/popping/catching, failure of the implant, need for future surgeries, hematoma, possible nerve or blood vessel injury, need for transfusion, and potential risk of stroke,heart attack or death, among others.  The patient understands and wishes to proceed.     It was explained that if tissue has been repaired or reconstructed, there is also an increased chance of failure which may require further management.  Following the completion of the discussion, the patient expressed understanding of this planned course of care, all their questions were answered and  consent will be obtained preoperatively.    Operative Plan: right [employer] and Nephew Oxinium Total Knee Replacement an overnight staywith home health rehab        Cooper Ruby MD  10/9/2018

## 2018-10-17 NOTE — ANESTHESIA PROCEDURE NOTES
Peripheral Block      Patient location during procedure: pre-op  Start time: 10/17/2018 10:44 AM  Stop time: 10/17/2018 10:52 AM  Reason for block: at surgeon's request and post-op pain management  Performed by  Anesthesiologist: Bin Welch MD  Preanesthetic Checklist  Completed: patient identified, site marked, surgical consent, pre-op evaluation, timeout performed, IV checked, risks and benefits discussed and monitors and equipment checked  Prep:  Sterile barriers:gloves and cap  Prep: ChloraPrep  Patient monitoring: blood pressure monitoring, continuous pulse oximetry and EKG  Procedure  Sedation:yes  Performed under: local infiltration  Guidance:ultrasound guided  ULTRASOUND INTERPRETATION.  Using ultrasound guidance a 21 G gauge needle was placed in close proximity to the femoral nerve, at which point, under ultrasound guidance anesthetic was injected in the area of the nerve and spread of the anesthesia was seen on ultrasound in close proximity thereto.  There were no abnormalities seen on ultrasound; a digital image was taken; and the patient tolerated the procedure with no complications. Images:still images obtained    Laterality:right  Block Type:adductor canal block  Injection Technique:single-shot  Needle Type:echogenic  Needle Gauge:21 G  Resistance on Injection: none    Medications  Comment:Dexamethasone 4 mg  Local Injected:ropivacaine 0.5% without epinephrine Local Amount Injected:30mL  Post Assessment  Injection Assessment: negative aspiration for heme, no paresthesia on injection and incremental injection  Patient Tolerance:comfortable throughout block  Complications:no  Additional Notes  Ultrasound interpretation:  Needle was seen in close proximity to nerve.  Local anesthetic seen surrounding area of nerve.  No abnormalities noted.  Still image obtained.

## 2018-10-17 NOTE — ANESTHESIA PROCEDURE NOTES
Airway  Urgency: elective    Date/Time: 10/17/2018 11:23 AM  End Time:10/17/2018 11:27 AM  Airway not difficult    General Information and Staff    Patient location during procedure: OR  Anesthesiologist: MORIAH LANDRY    Indications and Patient Condition  Indications for airway management: airway protection    Preoxygenated: yes  MILS not maintained throughout  Mask difficulty assessment: 2 - vent by mask + OA or adjuvant +/- NMBA    Final Airway Details  Final airway type: endotracheal airway      Successful airway: ETT  Cuffed: yes   Successful intubation technique: direct laryngoscopy  Facilitating devices/methods: intubating stylet and anterior pressure/BURP  Endotracheal tube insertion site: oral  Blade: CMAC  Blade size: D  ETT size: 7.5 mm  Cormack-Lehane Classification: grade III - view of epiglottis only  Placement verified by: chest auscultation and capnometry   Cuff volume (mL): 10  Measured from: teeth  ETT to teeth (cm): 22  Number of attempts at approach: 2    Additional Comments  Smooth IV induction, eyes taped, EZ mask with OAW, DL, ETT placed/secured, ETCO2>20x6.  Initial DL w/ Mac 3 blades shows only epiglottis.  Elect to convert to CMAC.  Pt is anterior requiring anterior pressure and intubating stylet

## 2018-10-17 NOTE — PLAN OF CARE
Problem: Patient Care Overview  Goal: Plan of Care Review  Outcome: Ongoing (interventions implemented as appropriate)   10/17/18 1551   Coping/Psychosocial   Plan of Care Reviewed With patient   Plan of Care Review   Progress improving   OTHER   Outcome Summary PO RTKA. Minimal c/o pain. BP slightly elevated but stable, will continue to monitor. To work with PT this afternoon. Plan to DC home with home health tomorrow.     Goal: Individualization and Mutuality  Outcome: Ongoing (interventions implemented as appropriate)    Goal: Discharge Needs Assessment  Outcome: Ongoing (interventions implemented as appropriate)    Goal: Interprofessional Rounds/Family Conf  Outcome: Ongoing (interventions implemented as appropriate)      Problem: Fall Risk (Adult)  Goal: Identify Related Risk Factors and Signs and Symptoms  Outcome: Outcome(s) achieved Date Met: 10/17/18   10/17/18 1551   Fall Risk (Adult)   Signs and Symptoms (Fall Risk) presence of risk factors     Goal: Absence of Fall  Outcome: Ongoing (interventions implemented as appropriate)   10/17/18 1551   Fall Risk (Adult)   Absence of Fall achieves outcome       Problem: Knee Arthroplasty (Total, Partial) (Adult)  Goal: Signs and Symptoms of Listed Potential Problems Will be Absent, Minimized or Managed (Knee Arthroplasty)  Outcome: Ongoing (interventions implemented as appropriate)   10/17/18 1551   Goal/Outcome Evaluation   Problems Assessed (Knee Arthroplasty) all   Problems Present (Knee Arthroplasty) pain;range of motion decreased     Goal: Anesthesia/Sedation Recovery  Outcome: Ongoing (interventions implemented as appropriate)   10/17/18 1551   Goal/Outcome Evaluation   Anesthesia/Sedation Recovery progressing toward baseline

## 2018-10-17 NOTE — THERAPY EVALUATION
Acute Care - Physical Therapy Initial Evaluation  [facility]     Patient Name: Darwin Sanchez  : 1945  MRN: [id number]  Today's Date: 10/17/2018   Onset of Illness/Injury or Date of Surgery: 10/17/18  Date of Referral to PT: 10/17/18  Referring Physician: Tung      Admit Date: 10/17/2018    Visit Dx:     ICD-10-CM ICD-9-CM   1. Impaired functional mobility and activity tolerance Z74.09 V49.89   2. Primary osteoarthritis of left knee M17.12 715.16     Patient Active Problem List   Diagnosis   • Primary osteoarthritis of left knee   • OA (osteoarthritis) of knee     Past Medical History:   Diagnosis Date   • Arthritis     OSTEO   • Cancer (CMS/HCC)     SQUAMOUS SKIN CANCER DIETER RECTAL  AREA   • Gout    • Hypertension      Past Surgical History:   Procedure Laterality Date   • COLONOSCOPY     • KNEE ARTHROSCOPY Bilateral    • SQUAMOUS CELL CARCINOMA EXCISION      DIETER RECTAL AREA        PT ASSESSMENT (last 12 hours)      Physical Therapy Evaluation     Row Name 10/17/18 1629          PT Evaluation Time/Intention    Subjective Information no complaints  -PC     Document Type evaluation  -PC     Mode of Treatment physical therapy  -PC     Patient Effort good  -PC     Symptoms Noted During/After Treatment dizziness  -PC     Comment mild dizziness with sitting , resolved  -PC     Row Name 10/17/18 0642          General Information    Patient Profile Reviewed? yes  -PC     Onset of Illness/Injury or Date of Surgery 10/17/18  -PC     Referring Physician Tung  -PC     Patient Observations alert;cooperative;agree to therapy  -PC     Patient/Family Observations pt is in bed, no acute distress  -PC     Prior Level of Function independent:;all household mobility;community mobility  -PC     Equipment Currently Used at Home none  -PC     Pertinent History of Current Functional Problem R TKR  -PC     Existing Precautions/Restrictions fall  -PC     Row Name 10/17/18 8836          Home Main Entrance    Stairs Comment,  Main Entrance pt lives with wife who is not able to assist, son reports that he will be able to assist as well, pt has 3 stairs to enter home  -PC     Row Name 10/17/18 1629          Cognitive Assessment/Intervention- PT/OT    Orientation Status (Cognition) oriented x 4  -PC     Follows Commands (Cognition) WNL  -PC     Row Name 10/17/18 1629          Bed Mobility Assessment/Treatment    Bed Mobility Assessment/Treatment supine-sit;sit-supine  -PC     Supine-Sit [place] (Bed Mobility) minimum assist (75% patient effort)  -PC     Row Name 10/17/18 1629          Transfer Assessment/Treatment    Transfer Assessment/Treatment sit-stand transfer;stand-sit transfer  -PC     Sit-Stand [place] (Transfers) minimum assist (75% patient effort)  -PC     Stand-Sit [place] (Transfers) minimum assist (75% patient effort)  -PC     Row Name 10/17/18 1629          Sit-Stand Transfer    Assistive Device (Sit-Stand Transfers) walker, front-wheeled  -PC     Row Name 10/17/18 1629          Stand-Sit Transfer    Assistive Device (Stand-Sit Transfers) walker, front-wheeled  -PC     Row Name 10/17/18 1629          Gait/Stairs Assessment/Training    [place] Level (Gait) minimum assist (75% patient effort)  -PC     Assistive Device (Gait) walker, front-wheeled  -PC     Distance in Feet (Gait) 80 ft  -PC     Pattern (Gait) step-through  -PC     Deviations/Abnormal Patterns (Gait) antalgic  -PC     Row Name 10/17/18 1629          General ROM    GENERAL ROM COMMENTS WFL x  Rknee  -PC     Row Name 10/17/18 1629          MMT (Manual Muscle Testing)    General MMT Comments WNL x R LE  -PC     Row Name 10/17/18 1629          Motor Assessment/Intervention    Additional Documentation Therapeutic Exercise Interventions (Group)  -PC     Row Name 10/17/18 1629          Therapeutic Exercise    Comment (Therapeutic Exercise) 10 reps TKR ex  -PC     Row Name 10/17/18 1629          Pain Assessment    Additional Documentation Pain Scale:  Numbers Pre/Post-Treatment (Group)  -PC     Row Name 10/17/18 1629          Pain Scale: Numbers Pre/Post-Treatment    Pain Scale: Numbers, Pretreatment 2/10  -PC     Pain Location - Side Right  -PC     Pain Location knee  -PC     Row Name             Wound 10/17/18 1210 Right knee incision    Wound - Properties Group Date first assessed: 10/17/18  -JT Time first assessed: 1210  -JT Side: Right  -JT Location: knee  -JT Type: incision  -JT    Row Name 10/17/18 1629          Plan of Care Review    Plan of Care Reviewed With patient  -PC     Row Name 10/17/18 1629          Physical Therapy Clinical Impression    Date of Referral to PT 10/17/18  -PC     Criteria for Skilled Interventions Met (PT Clinical Impression) yes;treatment indicated  -PC     Impairments Found (describe specific impairments) gait, locomotion, and balance;ROM;muscle performance  -PC     Rehab Potential (PT Clinical Summary) good, to achieve stated therapy goals  -PC     Row Name 10/17/18 1629          Physical Therapy Goals    Transfer Goal Selection (PT) transfer, PT goal 1  -PC     Gait Training Goal Selection (PT) gait training, PT goal 1  -PC     ROM Goal Selection (PT) ROM, PT goal 1  -PC     Stairs Goal Selection (PT) stairs, PT goal 1  -PC     Additional Documentation ROM Goal Selection (PT) (Row);Stairs Goal Selection (PT) (Row)  -PC     Row Name 10/17/18 1628          Transfer Goal 1 (PT)    Activity/Assistive Device (Transfer Goal 1, PT) sit-to-stand/stand-to-sit  -PC     [place] Level/Cues Needed (Transfer Goal 1, PT) supervision required  -PC     Time Frame (Transfer Goal 1, PT) 2 - 3 days  -PC     Row Name 10/17/18 1629          Gait Training Goal 1 (PT)    Activity/Assistive Device (Gait Training Goal 1, PT) gait (walking locomotion);assistive device use  -PC     [place] Level (Gait Training Goal 1, PT) supervision required  -PC     Distance (Gait Goal 1, PT) 100 ft  -PC     Time Frame (Gait Training Goal 1, PT) 2 - 3 days   -PC     Row Name 10/17/18 1629          ROM Goal 1 (PT)    ROM Goal 1 (PT) 5-90  -PC     Time Frame (ROM Goal 1, PT) 2 - 3 days  -PC     Row Name 10/17/18 1629          Stairs Goal 1 (PT)    Activity/Assistive Device (Stairs Goal 1, PT) stairs, all skills;ascending stairs;descending stairs  -PC     [place] Level/Cues Needed (Stairs Goal 1, PT) contact guard assist  -PC     Number of Stairs (Stairs Goal 1, PT) 4  -PC     Time Frame (Stairs Goal 1, PT) 3 days  -PC     Row Name 10/17/18 1629          Positioning and Restraints    Pre-Treatment Position in bed  -PC     Post Treatment Position chair  -PC     In Chair reclined;call light within reach;encouraged to call for assist;with family/caregiver  -PC       User Key  (r) = Recorded By, (t) = Taken By, (c) = Cosigned By    Initials Name Provider Type    PC Cookie Dooley, PT Physical Therapist    Aurelio Lima RN Registered Nurse          Physical Therapy Education     Title: PT OT SLP Therapies (Active)     Topic: Physical Therapy (Active)     Point: Mobility training (Active)    Learning Progress Summary     Learner Status Readiness Method Response Comment Documented by    Patient Active Acceptance E,D NR  PC 10/17/18 1644          Point: Home exercise program (Active)    Learning Progress Summary     Learner Status Readiness Method Response Comment Documented by    Patient Active Acceptance E,D NR  PC 10/17/18 1644          Point: Body mechanics (Active)    Learning Progress Summary     Learner Status Readiness Method Response Comment Documented by    Patient Active Acceptance E,D NR  PC 10/17/18 1644          Point: Precautions (Active)    Learning Progress Summary     Learner Status Readiness Method Response Comment Documented by    Patient Active Acceptance E,D NR  PC 10/17/18 1644                      User Key     Initials Effective Dates Name Provider Type [facility] 04/03/18 -  Cookie Dooley PT Physical Therapist PT                PT  Recommendation and Plan  Anticipated Discharge Disposition (PT): home with assist, home with home health  Planned Therapy Interventions (PT Eval): bed mobility training, gait training, home exercise program, ROM (range of motion), stair training, strengthening, transfer training  Therapy Frequency (PT Clinical Impression): 2 times/day  Outcome Summary/Treatment Plan (PT)  Anticipated Discharge Disposition (PT): home with assist, home with home health  Plan of Care Reviewed With: patient  Outcome Summary: s/p R TKR with expected post op weakness, dec ROM R knee, and impaired functional mobility, he will benefit from PT to address. Pt plans on going home at d/c. He did fairly well this afternoon, able to walk in ayers with assist, he had some dizziness with getting up, highly motivated, expect good progress          Outcome Measures     Row Name 10/17/18 1600             How much help from another person do you currently need...    Turning from your back to your side while in flat bed without using bedrails? 3  -PC      Moving from lying on back to sitting on the side of a flat bed without bedrails? 3  -PC      Moving to and from a bed to a chair (including a wheelchair)? 3  -PC      Standing up from a chair using your arms (e.g., wheelchair, bedside chair)? 3  -PC      Climbing 3-5 steps with a railing? 2  -PC      To walk in hospital room? 3  -PC      AM-PAC 6 Clicks Score 17  -PC         Functional Assessment    Outcome Measure Options AM-PAC 6 Clicks Basic Mobility (PT)  -PC        User Key  (r) = Recorded By, (t) = Taken By, (c) = Cosigned By    Initials Name Provider Type    PC Cookie Dooley PT Physical Therapist           Time Calculation:         PT Charges     Row Name 10/17/18 1646             Time Calculation    Start Time 1610  -PC      Stop Time 1635  -PC      Time Calculation (min) 25 min  -PC      PT Received On 10/17/18  -PC      PT - Next Appointment 10/18/18  -PC      PT Goal Re-Cert Due Date  10/20/18  -PC        User Key  (r) = Recorded By, (t) = Taken By, (c) = Cosigned By    Initials Name Provider Type    PC Cookie Dooley, PT Physical Therapist        Therapy Suggested Charges     Code   Minutes Charges    None           Therapy Charges for Today     Code Description Service Date Service Provider Modifiers Qty    49626248541 HC PT EVAL MOD COMPLEXITY 2 10/17/2018 Cookie Dooley, PT GP 1    26146757210 HC PT THER PROC EA 15 MIN 10/17/2018 Cookie Dooley, PT GP 1          PT G-Codes  Outcome Measure Options: AM-PAC 6 Clicks Basic Mobility (PT)  AM-PAC 6 Clicks Score: 17      Cookie Dooley PT  10/17/2018

## 2018-10-17 NOTE — ANESTHESIA PREPROCEDURE EVALUATION
Anesthesia Evaluation     Patient summary reviewed and Nursing notes reviewed   NPO Solid Status: > 8 hours  NPO Liquid Status: > 2 hours           Airway   Mallampati: II  TM distance: >3 FB  Neck ROM: full  Dental - normal exam     Pulmonary - negative pulmonary ROS and normal exam    breath sounds clear to auscultation  Cardiovascular - normal exam    Beta blocker given within 24 hours of surgery  Rhythm: regular  Rate: normal    (+) hypertension,   (-) angina, orthopnea, PND, SUAREZ      Neuro/Psych- negative ROS  GI/Hepatic/Renal/Endo    (+) obesity,       Musculoskeletal     Abdominal    Substance History - negative use     OB/GYN negative ob/gyn ROS         Other   (+) arthritis   history of cancer                    Anesthesia Plan    ASA 2     general and regional   (Adductor canal block for PO pain)  intravenous induction   Anesthetic plan, all risks, benefits, and alternatives have been provided, discussed and informed consent has been obtained with: patient.

## 2018-10-17 NOTE — OP NOTE
Name: Darwin Sanchez  YOB: 1945    DATE OF SURGERY: 10/17/2018    PREOPERATIVE DIAGNOSIS: Right knee end-stage osteoarthritis    POSTOPERATIVE DIAGNOSIS: Right knee end-stage osteoarthritis    PROCEDURE PERFORMED: Right total knee replacement    SURGEON: Cooper Ruby M.D.    ASSISTANT: LUCERO LIZAMA    IMPLANTS: [employer]:     Implant Name Type Inv. Item Serial No.  Lot No. LRB No. Used   CMT BONE PALACOS R HI/VISC 1X40 - [id number] Implant CMT BONE PALACOS R HI/VISC 1X40  [facility] [id number] Right 2   PAT GEN2 BICONVEX [id number] - [id number] Implant PAT GEN2 BICONVEX [id number]  [employer] [id number] Right 1   BASE TIB GEN2 NONPOR TI SZ7 RT - [id number] Implant BASE TIB GEN2 NONPOR TI SZ7 RT  [employer] [id number] Right 1   COMP FEM LEGION OXINIUM CR SZ7 RT - [id number] Implant COMP FEM LEGION OXINIUM CR SZ7 RT  [employer] [id number] Right 1   INSRT KN CR FLX DEEP GEN2 SZ7 8 11MM - [id number] Implant INSRT KN CR FLX DEEP GEN2 SZ7 8 11MM   [employer] [id number] Right 1       Estimated Blood Loss: 200cc  Specimens : none  Complications: none    DESCRIPTION OF PROCEDURE: The patient was taken to the operating room and placed in the supine position. A sequential compression device was carefully placed on the non-operative leg. Preoperative antibiotics were administered. Surgical time out was performed. After adequate induction of anesthesia, the leg was prepped and draped in the usual sterile fashion, exsanguinated with an Esmarch bandage and the tourniquet inflated to 250 mmHg. A midline incision was performed followed by a medial parapatellar arthrotomy. The patella was subluxed laterally.  A portion of the fat pad, ACL, and anterior horns of the meniscus were excised. The drill hole was placed in the distal femur and the canal was the irrigated and suctioned. The IM esther was placed and a 5 degree distal valgus cut was performed on the femur. The femur  was then sized with a sizing guide. The femoral cutting block was placed and all femoral cuts were performed. The proximal tibia was exposed. We used the extramedullary tibial cutting guide set for removal of 9mm of bone off the high side. The tibial cut was performed. The posterior horns of the menisci were excised. The posterior osteophytes were removed. Flexion extension blocks were then used to balance the knee. The tibial cut surface was then sized with the sizing templates and the tibial and femoral trial were then placed. The knee was placed in full extension and then the tibial tray rotation was then matched to the femoral rotation and marked.    Attention was then placed to the patella. The patella was noted to track centrally through range of motion. The patella was then sized with the trials. The thickness of the patella was then measured. The patella was resurfaced and the surrounding osteophytes were removed. The preoperative thickness was reproduced. The patella tracked centrally through range of motion.   At this point all trial components were removed, the knee was copiously irrigated with pulsed lavage, and the knee was injected with anesthetic cocktail solution. The cut surfaces were then dried with clean lap sponges, and the components were cemented tibia, followed by femur, then patella. The knee was held in full extension and all excess cement was removed. The knee was held still until the cement had completely hardened. We then placed the trial polyethylene spacer which resulted in full extension and excellent flexion-extension balance. We placed the final polyethylene spacer.   The knee was then copiously irrigated. The tourniquet was then released. There was excellent hemostasis. We placed a one-eighth inch Hemovac drain. We closed the knee in multiple layers in standard fashion. Sterile dressing were applied. At the end of the case, the sponge and needle counts were reported as being  correct. There were no known complications. The patient was then transported to the recovery room.      Cooper Ruby M.D.

## 2018-10-17 NOTE — PLAN OF CARE
Problem: Patient Care Overview  Goal: Plan of Care Review  Outcome: Ongoing (interventions implemented as appropriate)   10/17/18 1698   OTHER   Outcome Summary s/p R TKR with expected post op weakness, dec ROM R knee, and impaired functional mobility, he will benefit from PT to address. Pt plans on going home at d/c. He did fairly well this afternoon, able to walk in ayers with assist, he had some dizziness with getting up, highly motivated, expect good progress

## 2018-10-18 VITALS
DIASTOLIC BLOOD PRESSURE: 64 MMHG | HEART RATE: 65 BPM | WEIGHT: 249.12 LBS | SYSTOLIC BLOOD PRESSURE: 103 MMHG | BODY MASS INDEX: 33.74 KG/M2 | HEIGHT: 72 IN | RESPIRATION RATE: 16 BRPM | TEMPERATURE: 98.4 F | OXYGEN SATURATION: 93 %

## 2018-10-18 LAB
HCT VFR BLD AUTO: 38.8 % (ref 40.4–52.2)
HGB BLD-MCNC: 12.1 G/DL (ref 13.7–17.6)

## 2018-10-18 PROCEDURE — 85014 HEMATOCRIT: CPT | Performed by: ORTHOPAEDIC SURGERY

## 2018-10-18 PROCEDURE — 97110 THERAPEUTIC EXERCISES: CPT

## 2018-10-18 PROCEDURE — 85018 HEMOGLOBIN: CPT | Performed by: ORTHOPAEDIC SURGERY

## 2018-10-18 PROCEDURE — 25010000003 CEFAZOLIN IN DEXTROSE 2-4 GM/100ML-% SOLUTION: Performed by: ORTHOPAEDIC SURGERY

## 2018-10-18 PROCEDURE — 99024 POSTOP FOLLOW-UP VISIT: CPT | Performed by: NURSE PRACTITIONER

## 2018-10-18 RX ORDER — PSEUDOEPHEDRINE HCL 30 MG
100 TABLET ORAL 2 TIMES DAILY
Qty: 27 CAPSULE | Refills: 0 | Status: SHIPPED | OUTPATIENT
Start: 2018-10-18 | End: 2018-11-01

## 2018-10-18 RX ORDER — ONDANSETRON 4 MG/1
4 TABLET, FILM COATED ORAL EVERY 6 HOURS PRN
Qty: 10 TABLET | Refills: 0 | Status: SHIPPED | OUTPATIENT
Start: 2018-10-18 | End: 2021-01-29

## 2018-10-18 RX ORDER — HYDROCODONE BITARTRATE AND ACETAMINOPHEN 7.5; 325 MG/1; MG/1
2 TABLET ORAL EVERY 4 HOURS PRN
Qty: 60 TABLET | Refills: 0 | Status: SHIPPED | OUTPATIENT
Start: 2018-10-18 | End: 2018-10-27

## 2018-10-18 RX ORDER — PANTOPRAZOLE SODIUM 40 MG/1
40 TABLET, DELAYED RELEASE ORAL DAILY
Qty: 14 TABLET | Refills: 0 | Status: SHIPPED | OUTPATIENT
Start: 2018-10-18 | End: 2021-02-11

## 2018-10-18 RX ORDER — MELOXICAM 15 MG/1
15 TABLET ORAL DAILY
Qty: 10 TABLET | Refills: 0 | Status: SHIPPED | OUTPATIENT
Start: 2018-10-18 | End: 2018-10-30 | Stop reason: SDUPTHER

## 2018-10-18 RX ADMIN — HYDROCODONE BITARTRATE AND ACETAMINOPHEN 2 TABLET: 7.5; 325 TABLET ORAL at 06:11

## 2018-10-18 RX ADMIN — CEFAZOLIN SODIUM 2 G: 2 INJECTION, SOLUTION INTRAVENOUS at 03:19

## 2018-10-18 RX ADMIN — PANTOPRAZOLE SODIUM 40 MG: 40 TABLET, DELAYED RELEASE ORAL at 06:11

## 2018-10-18 RX ADMIN — HYDROCODONE BITARTRATE AND ACETAMINOPHEN 2 TABLET: 7.5; 325 TABLET ORAL at 10:05

## 2018-10-18 RX ADMIN — DOCUSATE SODIUM 100 MG: 100 CAPSULE, LIQUID FILLED ORAL at 09:18

## 2018-10-18 RX ADMIN — ASPIRIN 325 MG: 325 TABLET, DELAYED RELEASE ORAL at 09:18

## 2018-10-18 RX ADMIN — ALLOPURINOL 100 MG: 100 TABLET ORAL at 09:18

## 2018-10-18 RX ADMIN — CARVEDILOL 12.5 MG: 12.5 TABLET, FILM COATED ORAL at 09:18

## 2018-10-18 RX ADMIN — HYDROCODONE BITARTRATE AND ACETAMINOPHEN 2 TABLET: 7.5; 325 TABLET ORAL at 00:58

## 2018-10-18 RX ADMIN — Medication 3 MG: at 00:58

## 2018-10-18 RX ADMIN — POLYETHYLENE GLYCOL 3350 17 G: 17 POWDER, FOR SOLUTION ORAL at 09:18

## 2018-10-18 RX ADMIN — MELOXICAM 15 MG: 15 TABLET ORAL at 09:18

## 2018-10-18 NOTE — DISCHARGE SUMMARY
Patient Name: Darwin Sanchez  Patient YOB: 1945    Date of Admission:  10/17/2018  Date of Discharge:  10/18/2018  Discharge Diagnosis: TOTAL KNEE ARTHROPLASTY  Presenting Problem/History of Present Illness: Primary osteoarthritis of left knee [M17.12]  OA (osteoarthritis) of knee [M17.10]  Primary osteoarthritis of left knee [M17.12]  Admitting Physician: Dr Cooper Ruby  Consults:   Consults     No orders found for last 30 day(s).          DETAILS OF HOSPITAL STAY:  Patient is a 72 y.o. male was admitted to the floor following the above procedure and underwent an uncomplicated hospital stay.  Patient did well with physical therapy and was ambulating well without problems.  On the day of discharge the wound was clean, dry and intact and calf was soft and non tender and Homans sign was negative.  Patient was tolerating  without problems.  Patient will be discharged home.    Condition on Discharge:  Stable    Vital Signs  Temp:  [97.6 °F (36.4 °C)-98.7 °F (37.1 °C)] 97.6 °F (36.4 °C)  Heart Rate:  [58-81] 60  Resp:  [16-18] 16  BP: (104-169)/(66-96) 104/69    LABS:      Admission on 10/17/2018   Component Date Value Ref Range Status   • Hemoglobin 10/18/2018 12.1* 13.7 - 17.6 g/dL Final   • Hematocrit 10/18/2018 38.8* 40.4 - 52.2 % Final       No results found.    Discharge Medications     Discharge Medications      New Medications      Instructions Start Date   aspirin 325 MG EC tablet   325 mg, Oral, Every 12 Hours Scheduled      docusate sodium 100 MG capsule   100 mg, Oral, 2 Times Daily      HYDROcodone-acetaminophen 7.5-325 MG per tablet  Commonly known as:  NORCO   2 tablets, Oral, Every 4 Hours PRN      meloxicam 15 MG tablet  Commonly known as:  MOBIC   15 mg, Oral, Daily      ondansetron 4 MG tablet  Commonly known as:  ZOFRAN   4 mg, Oral, Every 6 Hours PRN      pantoprazole 40 MG EC tablet  Commonly known as:  PROTONIX   40 mg, Oral, Daily      polyethylene glycol pack packet  Commonly  known as:  MIRALAX   17 g, Oral, 2 Times Daily         Continue These Medications      Instructions Start Date   allopurinol 100 MG tablet  Commonly known as:  ZYLOPRIM   100 mg, Oral, Daily      carvedilol 12.5 MG tablet  Commonly known as:  COREG   12.5 mg, Oral, Every 12 Hours Scheduled      hydrALAZINE 50 MG tablet  Commonly known as:  APRESOLINE   50 mg, Oral, Every Morning      losartan-hydrochlorothiazide 50-12.5 MG per tablet  Commonly known as:  HYZAAR   1 tablet, Oral, Daily      LUNESTA 1 MG tablet  Generic drug:  eszopiclone   5 mg, Oral, Nightly, Take immediately before bedtime          Stop These Medications    CHLORHEXIDINE GLUCONATE CLOTH EX     clobetasol 0.05 % cream  Commonly known as:  TEMOVATE     indomethacin SR 75 MG CR capsule  Commonly known as:  INDOCIN SR     mupirocin 2 % nasal ointment  Commonly known as:  BACTROBAN            Activity at Discharge:     Discharge Instructions: Patient is to continue with physical therapy exercises daily and continue working with the physical therapist as ordered. Patient may weight bear as tolerated. Apply ice regularly. Patient may ice for long periods of time as long as ice is not directly on the skin. Patient instructed on frequent calf pumping exercises.  Patient also instructed on incentive spirometer during hospitalization and encouraged to continue to use at home regularly.    *The dressing should be left in place until the suction unit stops functioning (typically 7 days.  If waterproof dressing is intact the patient may shower immediately following discharge. If the dressing becomes disloged or saturated it should be changed. Please refer to the SABIHA information sheet if you have any questions about the dressing.  If you have a home health nurse or therapist they can be contacted to assist with dressing change or repair. You may also call the SABIHA dressing hotline for questions related to the dressing ([phone]). If there still other  problems or questions related to the dressing despite these measures then you can contact Kaelyn at our office [phone].  After 1 week when the SABIHA dressing is removed, the wound should be gently cleaned with antibacterial soap then allowed to dry and covered with a dry sterile dressing. The wound should be covered at all times except while showering.  Patient may change dressings daily and prn using sterile 4x4 and paper tape, and should call if any unusual drainage, redness or swelling.*  Follow up appointment in 2 weeks - patient to call the office at [phone] to schedule.  Patient will be discharged on aspirin 325mg BID x 2weeks, then daily x 4weeks    Discharge Diagnosis:    Patient Active Problem List   Diagnosis   • Primary osteoarthritis of left knee   • OA (osteoarthritis) of knee       Follow-up Appointments  No future appointments.  Additional Instructions for the Follow-ups that You Need to Schedule     Ambulatory Referral to Home Health    As directed      Face to Face Visit Date:  10/18/2018    Follow-up Provider for Plan of Care?:  I will be treating the patient on an ongoing basis.  Please send me the Plan of Care for signature.    Follow-up Provider:  TROY SCHMITZ [[id number]]    Reason/Clinical Findings:  tka    Describe mobility limitations that make leaving home difficult:  postop    Nursing/Therapeutic Services Requested:  Physical Therapy    PT orders:  Total joint pathway    Frequency:  1 Week 1                  AMANDO Oscar  10/18/18  9:19 AM

## 2018-10-18 NOTE — PROGRESS NOTES
Discharge Planning Assessment  [facility]     Patient Name: Darwin Sanchez  MRN: [id number]  Today's Date: 10/18/2018    Admit Date: 10/17/2018          Discharge Needs Assessment     Row Name 10/18/18 1601       Living Environment    Lives With spouse    Current Living Arrangements home/apartment/condo       Discharge Needs Assessment    Readmission Within the Last 30 Days no previous admission in last 30 days    Concerns to be Addressed basic needs    Discharge Facility/Level of Care Needs home with home health            Discharge Plan     Row Name 10/18/18 1602       Plan    Plan [facility]    Patient/Family in Agreement with Plan yes    Plan Comments Spoke with pt, pt would like to d/c home with [facility], referral left on the [facility] voicemail (ext [phone]). Plan will be to d/c home with [facility] and family support.    Final Discharge Disposition Code 06 - home with home health care    Final Note Pt d/c'ed home with [facility].        Destination     No service coordination in this encounter.      Durable Medical Equipment     No service coordination in this encounter.      Dialysis/Infusion     No service coordination in this encounter.      Home Medical Care - Selection Complete     Service Request Status Selected Specialties Address Phone Number Fax Number    [facility] Selected Home Health Services [address] [address] [phone]      Social Care     No service coordination in this encounter.        Expected Discharge Date and Time     Expected Discharge Date Expected Discharge Time    Oct 18, 2018               Demographic Summary    No documentation.           Functional Status    No documentation.           Psychosocial    No documentation.           Abuse/Neglect    No documentation.           Legal    No documentation.           Substance Abuse    No documentation.           Patient Forms    No documentation.         Annie Acuña RN

## 2018-10-18 NOTE — PLAN OF CARE
Problem: Patient Care Overview  Goal: Plan of Care Review  Outcome: Ongoing (interventions implemented as appropriate)   10/18/18 0200   Coping/Psychosocial   Plan of Care Reviewed With patient   OTHER   Outcome Summary client POD 0 RTKA. VSS, pain well controlled with prn norco, denies any GI upset. tolerates amb well with assist x1, walker and gait belt. plans to d/c home 10/18 with HH. discussed BP monitoring and parameters r/t hx HTN.      Goal: Individualization and Mutuality  Outcome: Ongoing (interventions implemented as appropriate)    Goal: Discharge Needs Assessment  Outcome: Ongoing (interventions implemented as appropriate)      Problem: Fall Risk (Adult)  Goal: Absence of Fall  Outcome: Ongoing (interventions implemented as appropriate)      Problem: Knee Arthroplasty (Total, Partial) (Adult)  Goal: Signs and Symptoms of Listed Potential Problems Will be Absent, Minimized or Managed (Knee Arthroplasty)  Outcome: Ongoing (interventions implemented as appropriate)    Goal: Anesthesia/Sedation Recovery  Outcome: Ongoing (interventions implemented as appropriate)

## 2018-10-19 ENCOUNTER — TELEPHONE (OUTPATIENT)
Dept: ORTHOPEDIC SURGERY | Facility: CLINIC | Age: 73
End: 2018-10-19

## 2018-10-19 NOTE — THERAPY TREATMENT NOTE
Acute Care - Physical Therapy Treatment Note  [facility]     Patient Name: Darwin Sanchez  : 1945  MRN: [id number]  Today's Date: 10/19/2018  Onset of Illness/Injury or Date of Surgery: 10/17/18  Date of Referral to PT: 10/17/18  Referring Physician: Tung    Admit Date: 10/17/2018    Visit Dx:    ICD-10-CM ICD-9-CM   1. Impaired functional mobility and activity tolerance Z74.09 V49.89   2. Primary osteoarthritis of left knee M17.12 715.16     Patient Active Problem List   Diagnosis   • Primary osteoarthritis of left knee   • OA (osteoarthritis) of knee       Therapy Treatment          Rehabilitation Treatment Summary     Row Name                Wound 10/17/18 1210 Right knee incision    Wound - Properties Group Date first assessed: 10/17/18 [JT] Time first assessed:  [JT] Side: Right [JT] Location: knee [JT] Type: incision [JT] Recorded by:  [JT] Aurelio Jewell RN 10/17/18 1210      User Key  (r) = Recorded By, (t) = Taken By, (c) = Cosigned By    Initials Name Effective Dates Discipline    JT Aurelio Jewell RN 17 -  Nurse                     Physical Therapy Education     Title: PT OT SLP Therapies (Resolved)     Topic: Physical Therapy (Resolved)     Point: Mobility training (Resolved)    Learning Progress Summary     Learner Status Readiness Method Response Comment Documented by    Patient Active Acceptance E,D NR  PC 10/17/18 1644          Point: Home exercise program (Resolved)    Learning Progress Summary     Learner Status Readiness Method Response Comment Documented by    Patient Active Acceptance E,D NR  PC 10/17/18 1644          Point: Body mechanics (Resolved)    Learning Progress Summary     Learner Status Readiness Method Response Comment Documented by    Patient Active Acceptance E,D NR  PC 10/17/18 1644          Point: Precautions (Resolved)    Learning Progress Summary     Learner Status Readiness Method Response Comment Documented by    Patient Active Acceptance E,D NR  PC  10/17/18 1644                      User Key     Initials Effective Dates Name Provider Type Discipline    PC 04/03/18 -  Cookie Dooley, PT Physical Therapist PT                    PT Recommendation and Plan                Outcome Measures     Row Name 10/18/18 1150 10/17/18 1600          How much help from another person do you currently need...    Turning from your back to your side while in flat bed without using bedrails? 3  -JM 3  -PC     Moving from lying on back to sitting on the side of a flat bed without bedrails? 3  - 3  -PC     Moving to and from a bed to a chair (including a wheelchair)? 3  - 3  -PC     Standing up from a chair using your arms (e.g., wheelchair, bedside chair)? 3  - 3  -PC     Climbing 3-5 steps with a railing? 3  - 2  -PC     To walk in hospital room? 3  - 3  -PC     AM-PAC 6 Clicks Score 18  - 17  -PC        Functional Assessment    Outcome Measure Options  -- AM-PAC 6 Clicks Basic Mobility (PT)  -PC       User Key  (r) = Recorded By, (t) = Taken By, (c) = Cosigned By    Initials Name Provider Type    PC Cookie Dooley, PT Physical Therapist    Jerica Washington PTA Physical Therapy Assistant           Time Calculation:     Therapy Suggested Charges     Code   Minutes Charges    None           Therapy Charges for Today     Code Description Service Date Service Provider Modifiers Qty    30834996770 HC PT THER PROC EA 15 MIN 10/18/2018 Jerica Hughes PTA GP 2          PT G-Codes  Outcome Measure Options: AM-PAC 6 Clicks Basic Mobility (PT)  AM-PAC 6 Clicks Score: 18    Jerica Hughse PTA  10/19/2018

## 2018-10-22 ENCOUNTER — TELEPHONE (OUTPATIENT)
Dept: ORTHOPEDIC SURGERY | Facility: CLINIC | Age: 73
End: 2018-10-22

## 2018-10-22 NOTE — TELEPHONE ENCOUNTER
Call return to the home health physical therapist.  The hydrocodone is causing itching but is also causing him to be simply impaired.  Patient is very tense and agitated.  Have advised them to discontinue the hydrocodone and can use Benadryl for itching.  Would recommend that he take Tylenol for pain.  If the Tylenol does not help the pain have recommended that he contact the office

## 2018-10-22 NOTE — TELEPHONE ENCOUNTER
Patients nurse is calling back stating the Hydrocodone is also affecting the patients cognitive ability. Please advise.cld

## 2018-10-23 ENCOUNTER — TELEPHONE (OUTPATIENT)
Dept: ORTHOPEDIC SURGERY | Facility: CLINIC | Age: 73
End: 2018-10-23

## 2018-10-23 RX ORDER — HYDROMORPHONE HYDROCHLORIDE 2 MG/1
2 TABLET ORAL EVERY 4 HOURS PRN
Qty: 30 TABLET | Refills: 0 | Status: SHIPPED | OUTPATIENT
Start: 2018-10-23 | End: 2018-10-29 | Stop reason: SDUPTHER

## 2018-10-23 NOTE — TELEPHONE ENCOUNTER
Wife called about message from yesterday. She says the pain Rx has been giving patient hallucinations and not helping his pain. She says Tylenol won't touch the kind of pain he is having. Please advise.

## 2018-10-23 NOTE — TELEPHONE ENCOUNTER
"Patient daughter called x 3 today, crying that \"no one is calling us back\". Attempted to call EP to see if could transfer call to [facility] / [facility], when no answer informed patient's daughter that [facility] / [facility] are seeing patients and someone would contact her soon. Thanks /srh  "

## 2018-10-25 ENCOUNTER — TELEPHONE (OUTPATIENT)
Dept: ORTHOPEDIC SURGERY | Facility: CLINIC | Age: 73
End: 2018-10-25

## 2018-10-25 NOTE — TELEPHONE ENCOUNTER
"Patient's daughter called with questions regarding patient's Hydromorphone (was switched to that 10/23) Patient's daughter stating patient is \"still in some pain\" \"whenever he moves his knee is a 10/10\" Asking if he can be prescribed something stronger or come in for sooner PO appt than currently scheduled Tues 10/30/18? Thanks /srh  "

## 2018-10-30 ENCOUNTER — OFFICE VISIT (OUTPATIENT)
Dept: ORTHOPEDIC SURGERY | Facility: CLINIC | Age: 73
End: 2018-10-30

## 2018-10-30 VITALS — HEIGHT: 72 IN | BODY MASS INDEX: 31.83 KG/M2 | TEMPERATURE: 98.7 F | WEIGHT: 235 LBS

## 2018-10-30 DIAGNOSIS — Z96.651 STATUS POST RIGHT KNEE REPLACEMENT: Primary | ICD-10-CM

## 2018-10-30 PROCEDURE — 99024 POSTOP FOLLOW-UP VISIT: CPT | Performed by: ORTHOPAEDIC SURGERY

## 2018-10-30 RX ORDER — MELOXICAM 15 MG/1
15 TABLET ORAL DAILY
Qty: 30 TABLET | Refills: 0 | Status: SHIPPED | OUTPATIENT
Start: 2018-10-30 | End: 2018-11-09 | Stop reason: SDUPTHER

## 2018-10-30 RX ORDER — MELOXICAM 15 MG/1
15 TABLET ORAL DAILY
Qty: 10 TABLET | Refills: 0 | Status: SHIPPED | OUTPATIENT
Start: 2018-10-30 | End: 2018-10-30 | Stop reason: SDUPTHER

## 2018-10-30 RX ORDER — HYDROMORPHONE HYDROCHLORIDE 2 MG/1
2 TABLET ORAL EVERY 4 HOURS PRN
Qty: 30 TABLET | Refills: 0 | Status: SHIPPED | OUTPATIENT
Start: 2018-10-30 | End: 2018-11-05 | Stop reason: SDUPTHER

## 2018-10-30 NOTE — PROGRESS NOTES
Darwin Sanchez : 1945 MRN: [id number] DATE: 10/30/2018    DIAGNOSIS: 2 week follow up right total knee      SUBJECTIVE:Patient returns today for 2 week follow up of right total knee replacement. Patient reports doing well with no unusual complaints. Appears to be progressing appropriately.    OBJECTIVE:   Exam:. The incision is healing appropriately. No sign of infection. Range of motion is progressing as expected. The calf is soft and nontender with a negative Homans sign.    ASSESSMENT: 2 week status post right knee replacement.    PLAN: 1) Staples removed and steri strips applied   2) Order given for PT   3) Discontinue BLAYNE hose   4) Continue ice PRN   5) aspirin 325 mg orally every day for 1 month   6) Follow up in 6 weeks with repeat Xrays of right knee (3views)   7) continue dilaudid and tylenol    Cooper Ruby MD  10/30/2018

## 2018-11-05 NOTE — TELEPHONE ENCOUNTER
SX 10/17/2018  TOTAL KNEE ARTHROPLASTY     [facility] Last filled 10/30/18 Hydromorphone 2 mg # 30 5 day supply

## 2018-11-06 RX ORDER — HYDROMORPHONE HYDROCHLORIDE 2 MG/1
2 TABLET ORAL EVERY 4 HOURS PRN
Qty: 30 TABLET | Refills: 0 | Status: SHIPPED | OUTPATIENT
Start: 2018-11-06 | End: 2018-11-14 | Stop reason: SDUPTHER

## 2018-11-09 ENCOUNTER — TELEPHONE (OUTPATIENT)
Dept: ORTHOPEDIC SURGERY | Facility: CLINIC | Age: 73
End: 2018-11-09

## 2018-11-09 RX ORDER — MELOXICAM 15 MG/1
15 TABLET ORAL DAILY
Qty: 30 TABLET | Refills: 0 | Status: SHIPPED | OUTPATIENT
Start: 2018-11-09 | End: 2018-11-09 | Stop reason: SDUPTHER

## 2018-11-09 RX ORDER — DIAZEPAM 5 MG/1
5 TABLET ORAL EVERY 6 HOURS PRN
Qty: 30 TABLET | Refills: 0 | Status: SHIPPED | OUTPATIENT
Start: 2018-11-09 | End: 2021-02-19

## 2018-11-09 RX ORDER — MELOXICAM 15 MG/1
15 TABLET ORAL DAILY
Qty: 30 TABLET | Refills: 0 | Status: SHIPPED | OUTPATIENT
Start: 2018-11-09 | End: 2018-12-09

## 2018-11-09 NOTE — TELEPHONE ENCOUNTER
Mayra Sanchez Ann [phone]   Leann Felton 5 hours ago (9:21 AM)      Incoming call:  Patient's wife called stating that the the Dilaudid medication isn't helping, wants to know if an anxiety med would help. If not would need a refill. Also needs refill Meloxicam or wants to know if Indocine should be taken.

## 2018-11-13 ENCOUNTER — TELEPHONE (OUTPATIENT)
Dept: ORTHOPEDIC SURGERY | Facility: CLINIC | Age: 73
End: 2018-11-13

## 2018-11-13 NOTE — TELEPHONE ENCOUNTER
Patients therapist is going to contact PCP they are close by and he has a good relationship with that office and will see what they advise.cld

## 2018-11-14 RX ORDER — HYDROMORPHONE HYDROCHLORIDE 2 MG/1
2 TABLET ORAL EVERY 4 HOURS PRN
Qty: 30 TABLET | Refills: 0 | Status: SHIPPED | OUTPATIENT
Start: 2018-11-14 | End: 2018-12-11 | Stop reason: SDUPTHER

## 2018-11-14 NOTE — TELEPHONE ENCOUNTER
Call return to the patient.  I he is concerned because he's continuing to have pain on the lateral aspect of his operated knee.  Patient states that he's had this pain since surgery.  He denies any swelling, calf tenderness, and has remained afebrile.  States that he has 0-118° of range of motion.  Continuing to take Dilaudid every 4-6 hours on a regular basis for pain.  Patient states that when he takes the meloxicam in the morning with the Dilaudid that the best relief that he gets.  His worst time is at night.  Patient is asking if he can try using a Lidoderm patch or some topical cream to that knee.  Will check with [facility] and then get back with the patient

## 2018-11-15 NOTE — TELEPHONE ENCOUNTER
Have spoken with the patient.  Have advised him that the discomfort that he is feeling in his knee is expected postoperative pain and have explained to him that he could have some discomfort for several months after surgery.  Her dressing with physical therapy.  States that the physical therapist gave him a work good workout today and actually he does feel some better.  Have advised him to continue with his physical therapy.  I've also ask him to start weaning himself off of the Dilaudid and will follow-up with Dr. lozada at his regular scheduled appointment

## 2018-12-11 ENCOUNTER — OFFICE VISIT (OUTPATIENT)
Dept: ORTHOPEDIC SURGERY | Facility: CLINIC | Age: 73
End: 2018-12-11

## 2018-12-11 VITALS — WEIGHT: 235 LBS | HEIGHT: 72 IN | BODY MASS INDEX: 31.83 KG/M2 | TEMPERATURE: 97.9 F

## 2018-12-11 DIAGNOSIS — Z96.651 STATUS POST TOTAL RIGHT KNEE REPLACEMENT: Primary | ICD-10-CM

## 2018-12-11 PROCEDURE — 73562 X-RAY EXAM OF KNEE 3: CPT | Performed by: ORTHOPAEDIC SURGERY

## 2018-12-11 PROCEDURE — 99024 POSTOP FOLLOW-UP VISIT: CPT | Performed by: ORTHOPAEDIC SURGERY

## 2018-12-11 RX ORDER — MELOXICAM 15 MG/1
15 TABLET ORAL DAILY
COMMUNITY
End: 2018-12-11 | Stop reason: SDUPTHER

## 2018-12-11 RX ORDER — MELOXICAM 15 MG/1
15 TABLET ORAL DAILY
Qty: 30 TABLET | Refills: 0 | Status: SHIPPED | OUTPATIENT
Start: 2018-12-11 | End: 2019-01-10

## 2018-12-11 RX ORDER — HYDROMORPHONE HYDROCHLORIDE 2 MG/1
2 TABLET ORAL EVERY 4 HOURS PRN
Qty: 20 TABLET | Refills: 0 | Status: SHIPPED | OUTPATIENT
Start: 2018-12-11 | End: 2018-12-19 | Stop reason: SDUPTHER

## 2018-12-11 NOTE — PROGRESS NOTES
Darwin Sanchez : 1945 MRN: [id number] DATE: 2018    DIAGNOSIS: 8 week follow up right total knee      SUBJECTIVE:Patient returns today for 8 week follow up of right total knee replacement. Patient reports doing well with no unusual complaints. Appears to be progressing appropriately.    OBJECTIVE:   Exam:. The incision is well healed. No sign of infection. Range of motion is measured at 3 to 125. The calf is soft and nontender with a negative Homans sign. Strength is progressing and the patient is ambulating appropriately.    DIAGNOSTIC STUDIES  Xrays: 3 views of the right knee (AP, lateral, and sunrise) were ordered and reviewed for evaluation of recent knee replacement. They demonstrate a well positioned, well aligned knee replacement without complicating factors noted. In comparison with previous films there has been no change.    ASSESSMENT: 8 week status post right knee replacement.    PLAN: 1) Continue with PT exercises as prescribed   2) Follow up in 10 months    Cooper Ruby MD  2018

## 2018-12-21 RX ORDER — HYDROMORPHONE HYDROCHLORIDE 2 MG/1
TABLET ORAL
Qty: 20 TABLET | Refills: 0 | Status: SHIPPED | OUTPATIENT
Start: 2018-12-21 | End: 2019-10-15

## 2019-03-12 ENCOUNTER — OFFICE VISIT (OUTPATIENT)
Dept: ORTHOPEDIC SURGERY | Facility: CLINIC | Age: 74
End: 2019-03-12

## 2019-03-12 VITALS — BODY MASS INDEX: 30.61 KG/M2 | HEIGHT: 72 IN | TEMPERATURE: 98.1 F | WEIGHT: 226 LBS

## 2019-03-12 DIAGNOSIS — Z96.651 HISTORY OF TOTAL KNEE ARTHROPLASTY, RIGHT: Primary | ICD-10-CM

## 2019-03-12 PROCEDURE — 99213 OFFICE O/P EST LOW 20 MIN: CPT | Performed by: ORTHOPAEDIC SURGERY

## 2019-03-12 PROCEDURE — 73562 X-RAY EXAM OF KNEE 3: CPT | Performed by: ORTHOPAEDIC SURGERY

## 2019-03-12 NOTE — PROGRESS NOTES
Patient: Darwin Sanchez  YOB: 1945 73 y.o. male  Medical Record Number: [id number]    Chief Complaints:   Chief Complaint   Patient presents with   • Right Knee - Follow-up, Pain       History of Present Illness:Darwin Sanchez is a 73 y.o. male who presents for follow-up of  Right TKA  -6 months out-  Improving but still having occasional popping and some mild soreness with activity.    Allergies:   Allergies   Allergen Reactions   • Hydrocodone-Acetaminophen Itching, Mental Status Change and Hallucinations       Medications:   Current Outpatient Medications   Medication Sig Dispense Refill   • allopurinol (ZYLOPRIM) 100 MG tablet Take 100 mg by mouth Daily.     • carvedilol (COREG) 12.5 MG tablet Take 12.5 mg by mouth Every 12 (Twelve) Hours.     • diazePAM (VALIUM) 5 MG tablet Take 1 tablet by mouth Every 6 (Six) Hours As Needed for Anxiety for up to 30 doses. 30 tablet 0   • eszopiclone (LUNESTA) 1 MG tablet Take 5 mg by mouth Every Night. Take immediately before bedtime     • hydrALAZINE (APRESOLINE) 50 MG tablet Take 50 mg by mouth Every Morning.     • losartan-hydrochlorothiazide (HYZAAR) 50-12.5 MG per tablet Take 1 tablet by mouth Daily.     • ondansetron (ZOFRAN) 4 MG tablet Take 1 tablet by mouth Every 6 (Six) Hours As Needed for Nausea or Vomiting. 10 tablet 0   • pantoprazole (PROTONIX) 40 MG EC tablet Take 1 tablet by mouth Daily. 14 tablet 0   • HYDROmorphone (DILAUDID) 2 MG tablet take 1 tablet by mouth every 4 hours if needed for MODERATE PAIN 20 tablet 0     No current facility-administered medications for this visit.      Facility-Administered Medications Ordered in Other Visits   Medication Dose Route Frequency Provider Last Rate Last Dose   • mupirocin (BACTROBAN) 2 % nasal ointment   Nasal BID Cooper Ruby MD             The following portions of the patient's history were reviewed and updated as appropriate: allergies, current medications, past family history, past medical  "history, past social history, past surgical history and problem list.    Review of Systems:   A 14 point review of systems was performed. All systems negative except pertinent positives/negative listed in HPI above    Physical Exam:   Vitals:    03/12/19 1344   Temp: 98.1 °F (36.7 °C)   TempSrc: Temporal   Weight: 103 kg (226 lb)   Height: 182.9 cm (72\")       General: A and O x 3, ASA, NAD    SCLERA:    Normal    DENTITION:   Normal  Knee:  right    ALIGNMENT:     Neutral  ,   Patella  tracks  Midline without crepitance    GAIT:    Nonantalgic    SKIN:    Well healed midline incision, no erythema or fluctuance    RANGE OF MOTION:   0  -  120   DEG    STRENGTH:   5  / 5    LIGAMENTS:    No varus / valgus instability.   No  Flexion   instability.       DISTAL PULSES:    Paplable    DISTAL SENSATION :   Intact    LYMPHATICS:     No   lymphadenopathy    OTHER:     No   Effusion      Calf soft / nontender ,   Negative Jayleen's sign            Radiology:  Xrays 3views right knee (ap,lateral, sunrise) were ordered and reviewed for evaluation of knee pain demonstratinga well positioned knee replacement without evidence of wear, loosening or osteolysis  todays xrays were compared to previous xrays and demonstrate no change    Assessment/Plan:  Right TKA 6months with intermittent sx - no problems noted. Continue PT exercises, RTO 6 months.   "

## 2019-10-15 ENCOUNTER — OFFICE VISIT (OUTPATIENT)
Dept: ORTHOPEDIC SURGERY | Facility: CLINIC | Age: 74
End: 2019-10-15

## 2019-10-15 VITALS — HEIGHT: 72 IN | WEIGHT: 235 LBS | TEMPERATURE: 98.1 F | BODY MASS INDEX: 31.83 KG/M2

## 2019-10-15 DIAGNOSIS — Z96.651 HISTORY OF TOTAL KNEE ARTHROPLASTY, RIGHT: Primary | ICD-10-CM

## 2019-10-15 PROCEDURE — 73562 X-RAY EXAM OF KNEE 3: CPT | Performed by: ORTHOPAEDIC SURGERY

## 2019-10-15 PROCEDURE — 99212 OFFICE O/P EST SF 10 MIN: CPT | Performed by: ORTHOPAEDIC SURGERY

## 2019-10-15 NOTE — PROGRESS NOTES
"Darwin Sanchez : 1945 MRN: [id number] DATE: 10/15/2019    DIAGNOSIS: Annual follow up right total knee      SUBJECTIVE:Patient returns today for  1 year follow up of right total knee replacement. Patient reports doing well with no unusual complaints. Denies any limitations due to the knee.    OBJECTIVE:    Temp 98.1 °F (36.7 °C) (Temporal)   Ht 182.9 cm (72\")   Wt 107 kg (235 lb)   BMI 31.87 kg/m²   Family History   Problem Relation Age of Onset   • Malig Hyperthermia Neg Hx      Past Medical History:   Diagnosis Date   • Arthritis     OSTEO   • Cancer (CMS/HCC)     SQUAMOUS SKIN CANCER DIETER RECTAL  AREA   • Gout    • Hypertension      Past Surgical History:   Procedure Laterality Date   • COLONOSCOPY     • KNEE ARTHROSCOPY Bilateral    • SQUAMOUS CELL CARCINOMA EXCISION      DIETER RECTAL AREA   • TOTAL KNEE ARTHROPLASTY Right 10/17/2018    Procedure: TOTAL KNEE ARTHROPLASTY;  Surgeon: Cooper Ruby MD;  Location: [facility];  Service: Orthopedics     Social History     Socioeconomic History   • Marital status:      Spouse name: Not on file   • Number of children: Not on file   • Years of education: Not on file   • Highest education level: Not on file   Tobacco Use   • Smoking status: Never Smoker   • Smokeless tobacco: Never Used   Substance and Sexual Activity   • Alcohol use: No   • Drug use: No   • Sexual activity: Defer     Review of Systems: 14 point review of systems performed, all systems negative     Exam:. The incision is well healed. Range of motion is measured at 0 to 120. The calf is soft and nontender with a negative Homans sign. Alignment is neutral. Good quad strength. There is no evidence of varus/valgus or flexion instability. No effusion. Intact to light touch with palpable distal pulses.     DIAGNOSTIC STUDIES  Xrays: 3 views(AP bilateral knees, lateral right, and sunrise bilateral knees) were ordered and reviewed for evaluation of right knee replacement. They demonstrate " a well positioned, well aligned knee replacement without complicating factors noted. In comparison with previous films there has been no change.    ASSESSMENT: Annual follow up right knee replacement.    PLAN:  Continue activities as tolerated    Follow up in 1 year    Cooper Ruby MD  10/15/2019

## 2020-01-15 ENCOUNTER — OFFICE VISIT (OUTPATIENT)
Dept: WOUND CARE | Facility: HOSPITAL | Age: 75
End: 2020-01-15

## 2020-01-15 PROCEDURE — G0463 HOSPITAL OUTPT CLINIC VISIT: HCPCS

## 2020-03-23 ENCOUNTER — LAB REQUISITION (OUTPATIENT)
Dept: LAB | Facility: HOSPITAL | Age: 75
End: 2020-03-23

## 2020-03-23 DIAGNOSIS — Z00.00 ENCOUNTER FOR GENERAL ADULT MEDICAL EXAMINATION WITHOUT ABNORMAL FINDINGS: ICD-10-CM

## 2020-03-23 PROCEDURE — 87635 SARS-COV-2 COVID-19 AMP PRB: CPT | Performed by: EMERGENCY MEDICINE

## 2020-03-23 PROCEDURE — U0003 INFECTIOUS AGENT DETECTION BY NUCLEIC ACID (DNA OR RNA); SEVERE ACUTE RESPIRATORY SYNDROME CORONAVIRUS 2 (SARS-COV-2) (CORONAVIRUS DISEASE [COVID-19]), AMPLIFIED PROBE TECHNIQUE, MAKING USE OF HIGH THROUGHPUT TECHNOLOGIES AS DESCRIBED BY CMS-2020-01-R: HCPCS | Performed by: EMERGENCY MEDICINE

## 2020-04-04 LAB — SARS-COV-2 RNA RESP QL NAA+PROBE: NOT DETECTED

## 2021-01-18 ENCOUNTER — IMMUNIZATION (OUTPATIENT)
Dept: VACCINE CLINIC | Facility: HOSPITAL | Age: 76
End: 2021-01-18

## 2021-01-18 PROCEDURE — 91300 HC SARSCOV02 VAC 30MCG/0.3ML IM: CPT | Performed by: INTERNAL MEDICINE

## 2021-01-18 PROCEDURE — 0001A: CPT | Performed by: INTERNAL MEDICINE

## 2021-01-22 PROBLEM — E66.3 OVERWEIGHT: Status: ACTIVE | Noted: 2019-07-06

## 2021-01-22 PROBLEM — I10 BENIGN ESSENTIAL HYPERTENSION: Status: ACTIVE | Noted: 2019-07-06

## 2021-01-22 PROBLEM — C44.90 PRIMARY MALIGNANT NEOPLASM OF SKIN: Status: ACTIVE | Noted: 2019-07-06

## 2021-01-22 PROBLEM — L40.50 PSORIASIS WITH ARTHROPATHY (HCC): Status: ACTIVE | Noted: 2019-07-06

## 2021-01-22 PROBLEM — I65.29 CAROTID ARTERY OCCLUSION: Status: ACTIVE | Noted: 2019-07-06

## 2021-01-22 PROBLEM — E78.00 HYPERCHOLESTEROLEMIA: Status: ACTIVE | Noted: 2019-07-06

## 2021-01-22 PROBLEM — R73.01 IMPAIRED FASTING GLUCOSE: Status: ACTIVE | Noted: 2019-07-06

## 2021-01-22 PROBLEM — C44.520 SQUAMOUS CELL CARCINOMA OF ANAL MARGIN: Status: ACTIVE | Noted: 2021-01-22

## 2021-01-22 PROBLEM — M10.9 GOUTY ARTHROPATHY: Status: ACTIVE | Noted: 2019-07-06

## 2021-01-22 PROBLEM — M19.90 ARTHRITIS: Status: ACTIVE | Noted: 2021-01-22

## 2021-01-22 PROBLEM — I10 HYPERTENSION: Status: ACTIVE | Noted: 2021-01-22

## 2021-01-22 PROBLEM — E78.5 HYPERLIPIDEMIA: Status: ACTIVE | Noted: 2019-07-06

## 2021-01-22 RX ORDER — LOSARTAN POTASSIUM 100 MG/1
100 TABLET ORAL DAILY
COMMUNITY
End: 2021-04-17 | Stop reason: HOSPADM

## 2021-01-22 RX ORDER — ATORVASTATIN CALCIUM 40 MG/1
40 TABLET, FILM COATED ORAL DAILY
COMMUNITY

## 2021-01-22 RX ORDER — HYDROCHLOROTHIAZIDE 12.5 MG/1
12.5 TABLET ORAL DAILY
COMMUNITY
End: 2021-04-17 | Stop reason: HOSPADM

## 2021-01-22 RX ORDER — INDOMETHACIN 75 MG/1
75 CAPSULE, EXTENDED RELEASE ORAL 2 TIMES DAILY WITH MEALS
COMMUNITY

## 2021-01-22 RX ORDER — HYDROMORPHONE HYDROCHLORIDE 2 MG/1
TABLET ORAL
COMMUNITY
End: 2021-01-29

## 2021-01-22 RX ORDER — DESONIDE 0.5 MG/G
CREAM TOPICAL
COMMUNITY
End: 2021-02-11

## 2021-01-22 RX ORDER — VALSARTAN AND HYDROCHLOROTHIAZIDE 160; 25 MG/1; MG/1
TABLET ORAL
COMMUNITY
End: 2021-01-29

## 2021-01-22 RX ORDER — AMLODIPINE BESYLATE 10 MG/1
TABLET ORAL
COMMUNITY
End: 2021-01-29

## 2021-01-22 RX ORDER — CLOBETASOL PROPIONATE 0.5 MG/G
1 CREAM TOPICAL 2 TIMES DAILY PRN
COMMUNITY

## 2021-01-22 RX ORDER — TAMSULOSIN HYDROCHLORIDE 0.4 MG/1
CAPSULE ORAL
COMMUNITY
End: 2021-01-29

## 2021-01-22 RX ORDER — OXYCODONE HCL 20 MG/1
TABLET, FILM COATED, EXTENDED RELEASE ORAL
COMMUNITY
End: 2021-01-29

## 2021-01-22 RX ORDER — ZOLPIDEM TARTRATE 5 MG/1
5 TABLET ORAL NIGHTLY PRN
COMMUNITY

## 2021-01-22 RX ORDER — MELOXICAM 15 MG/1
TABLET ORAL
COMMUNITY
End: 2021-01-29

## 2021-01-22 RX ORDER — SILDENAFIL 100 MG/1
TABLET, FILM COATED ORAL
COMMUNITY
End: 2021-01-29

## 2021-01-22 RX ORDER — ONDANSETRON 8 MG/1
TABLET, ORALLY DISINTEGRATING ORAL
COMMUNITY
End: 2021-01-29

## 2021-01-25 ENCOUNTER — OFFICE VISIT (OUTPATIENT)
Dept: WOUND CARE | Facility: HOSPITAL | Age: 76
End: 2021-01-25

## 2021-01-25 PROCEDURE — G0463 HOSPITAL OUTPT CLINIC VISIT: HCPCS

## 2021-01-29 ENCOUNTER — OFFICE VISIT (OUTPATIENT)
Dept: SURGERY | Facility: CLINIC | Age: 76
End: 2021-01-29

## 2021-01-29 VITALS
WEIGHT: 249.2 LBS | DIASTOLIC BLOOD PRESSURE: 82 MMHG | HEIGHT: 72 IN | SYSTOLIC BLOOD PRESSURE: 154 MMHG | HEART RATE: 79 BPM | BODY MASS INDEX: 33.75 KG/M2 | TEMPERATURE: 97.2 F | OXYGEN SATURATION: 98 %

## 2021-01-29 DIAGNOSIS — T81.30XA DISRUPTION OF WOUND OF PERINEUM IN MALE: ICD-10-CM

## 2021-01-29 DIAGNOSIS — C21.0 ANAL CANCER (HCC): Primary | ICD-10-CM

## 2021-01-29 PROCEDURE — 99204 OFFICE O/P NEW MOD 45 MIN: CPT | Performed by: COLON & RECTAL SURGERY

## 2021-01-29 RX ORDER — HYDRALAZINE HYDROCHLORIDE 100 MG/1
100 TABLET, FILM COATED ORAL 3 TIMES DAILY
COMMUNITY
Start: 2020-12-08 | End: 2021-04-12 | Stop reason: HOSPADM

## 2021-01-29 RX ORDER — SODIUM CHLORIDE 0.9 % (FLUSH) 0.9 %
10 SYRINGE (ML) INJECTION AS NEEDED
Status: CANCELLED | OUTPATIENT
Start: 2021-02-23

## 2021-01-29 RX ORDER — LIDOCAINE 5 G/100G
CREAM RECTAL; TOPICAL
COMMUNITY
End: 2021-02-19

## 2021-01-29 RX ORDER — CEFAZOLIN SODIUM 2 G/100ML
2 INJECTION, SOLUTION INTRAVENOUS ONCE
Status: CANCELLED | OUTPATIENT
Start: 2021-02-23 | End: 2021-01-29

## 2021-01-29 RX ORDER — SODIUM CHLORIDE 0.9 % (FLUSH) 0.9 %
3 SYRINGE (ML) INJECTION EVERY 12 HOURS SCHEDULED
Status: CANCELLED | OUTPATIENT
Start: 2021-02-23

## 2021-02-01 ENCOUNTER — APPOINTMENT (OUTPATIENT)
Dept: WOUND CARE | Facility: HOSPITAL | Age: 76
End: 2021-02-01

## 2021-02-02 ENCOUNTER — TELEPHONE (OUTPATIENT)
Dept: SURGERY | Facility: CLINIC | Age: 76
End: 2021-02-02

## 2021-02-02 NOTE — TELEPHONE ENCOUNTER
Patient's daughter Madie Mcgee said that patient was in on 01/29/2021 and that you were to call her back regarding fathers visit, discussion, & plan.    Call Madie Mcgee at [phone].    Thank you.

## 2021-02-08 ENCOUNTER — IMMUNIZATION (OUTPATIENT)
Dept: VACCINE CLINIC | Facility: HOSPITAL | Age: 76
End: 2021-02-08

## 2021-02-08 PROCEDURE — 0002A: CPT | Performed by: INTERNAL MEDICINE

## 2021-02-08 PROCEDURE — 91300 HC SARSCOV02 VAC 30MCG/0.3ML IM: CPT | Performed by: INTERNAL MEDICINE

## 2021-02-10 ENCOUNTER — APPOINTMENT (OUTPATIENT)
Dept: PET IMAGING | Facility: HOSPITAL | Age: 76
End: 2021-02-10

## 2021-02-11 ENCOUNTER — TELEPHONE (OUTPATIENT)
Dept: SURGERY | Facility: CLINIC | Age: 76
End: 2021-02-11

## 2021-02-11 ENCOUNTER — HOSPITAL ENCOUNTER (OUTPATIENT)
Facility: HOSPITAL | Age: 76
Setting detail: OBSERVATION
Discharge: HOME | End: 2021-02-12
Attending: EMERGENCY MEDICINE | Admitting: STUDENT IN AN ORGANIZED HEALTH CARE EDUCATION/TRAINING PROGRAM

## 2021-02-11 ENCOUNTER — HOSPITAL ENCOUNTER (OUTPATIENT)
Dept: CT IMAGING | Facility: HOSPITAL | Age: 76
Discharge: HOME | End: 2021-02-11
Admitting: COLON & RECTAL SURGERY

## 2021-02-11 DIAGNOSIS — Z86.79 HISTORY OF HYPERTENSION: ICD-10-CM

## 2021-02-11 DIAGNOSIS — T14.8XXA BLEEDING FROM WOUND: ICD-10-CM

## 2021-02-11 DIAGNOSIS — C21.0 ANAL CANCER (HCC): ICD-10-CM

## 2021-02-11 DIAGNOSIS — Z86.39 HISTORY OF HYPERLIPIDEMIA: ICD-10-CM

## 2021-02-11 DIAGNOSIS — K62.6 PERIRECTAL ULCER: Primary | ICD-10-CM

## 2021-02-11 LAB
ABO GROUP BLD: NORMAL
ALBUMIN SERPL-MCNC: 3.8 G/DL (ref 3.5–5.2)
ALBUMIN/GLOB SERPL: 1.2 G/DL
ALP SERPL-CCNC: 77 U/L (ref 39–117)
ALT SERPL W P-5'-P-CCNC: 15 U/L (ref 1–41)
ANION GAP SERPL CALCULATED.3IONS-SCNC: 8 MMOL/L (ref 5–15)
APTT PPP: 28.2 SECONDS (ref 22.7–35.4)
AST SERPL-CCNC: 17 U/L (ref 1–40)
BASOPHILS # BLD AUTO: 0.05 10*3/MM3 (ref 0–0.2)
BASOPHILS NFR BLD AUTO: 0.8 % (ref 0–1.5)
BILIRUB SERPL-MCNC: 0.4 MG/DL (ref 0–1.2)
BLD GP AB SCN SERPL QL: NEGATIVE
BUN SERPL-MCNC: 29 MG/DL (ref 8–23)
BUN/CREAT SERPL: 24.6 (ref 7–25)
CALCIUM SPEC-SCNC: 9 MG/DL (ref 8.6–10.5)
CHLORIDE SERPL-SCNC: 105 MMOL/L (ref 98–107)
CO2 SERPL-SCNC: 27 MMOL/L (ref 22–29)
CREAT SERPL-MCNC: 1.18 MG/DL (ref 0.76–1.27)
D-LACTATE SERPL-SCNC: 1.1 MMOL/L (ref 0.5–2)
DEPRECATED RDW RBC AUTO: 40.1 FL (ref 37–54)
EOSINOPHIL # BLD AUTO: 0.17 10*3/MM3 (ref 0–0.4)
EOSINOPHIL NFR BLD AUTO: 2.8 % (ref 0.3–6.2)
ERYTHROCYTE [DISTWIDTH] IN BLOOD BY AUTOMATED COUNT: 12.9 % (ref 12.3–15.4)
GFR SERPL CREATININE-BSD FRML MDRD: 61 ML/MIN/1.73
GLOBULIN UR ELPH-MCNC: 3.2 GM/DL
GLUCOSE SERPL-MCNC: 101 MG/DL (ref 65–99)
HCT VFR BLD AUTO: 40 % (ref 37.5–51)
HGB BLD-MCNC: 13.4 G/DL (ref 13–17.7)
IMM GRANULOCYTES # BLD AUTO: 0.02 10*3/MM3 (ref 0–0.05)
IMM GRANULOCYTES NFR BLD AUTO: 0.3 % (ref 0–0.5)
INR PPP: 0.95 (ref 0.9–1.1)
LYMPHOCYTES # BLD AUTO: 1.44 10*3/MM3 (ref 0.7–3.1)
LYMPHOCYTES NFR BLD AUTO: 24.1 % (ref 19.6–45.3)
MCH RBC QN AUTO: 29.1 PG (ref 26.6–33)
MCHC RBC AUTO-ENTMCNC: 33.5 G/DL (ref 31.5–35.7)
MCV RBC AUTO: 86.8 FL (ref 79–97)
MONOCYTES # BLD AUTO: 0.74 10*3/MM3 (ref 0.1–0.9)
MONOCYTES NFR BLD AUTO: 12.4 % (ref 5–12)
NEUTROPHILS NFR BLD AUTO: 3.55 10*3/MM3 (ref 1.7–7)
NEUTROPHILS NFR BLD AUTO: 59.6 % (ref 42.7–76)
NRBC BLD AUTO-RTO: 0 /100 WBC (ref 0–0.2)
PLATELET # BLD AUTO: 155 10*3/MM3 (ref 140–450)
PMV BLD AUTO: 11.1 FL (ref 6–12)
POTASSIUM SERPL-SCNC: 3.9 MMOL/L (ref 3.5–5.2)
PROT SERPL-MCNC: 7 G/DL (ref 6–8.5)
PROTHROMBIN TIME: 12.5 SECONDS (ref 11.7–14.2)
RBC # BLD AUTO: 4.61 10*6/MM3 (ref 4.14–5.8)
RH BLD: POSITIVE
SARS-COV-2 RNA RESP QL NAA+PROBE: NOT DETECTED
SODIUM SERPL-SCNC: 140 MMOL/L (ref 136–145)
T&S EXPIRATION DATE: NORMAL
WBC # BLD AUTO: 5.97 10*3/MM3 (ref 3.4–10.8)

## 2021-02-11 PROCEDURE — 86850 RBC ANTIBODY SCREEN: CPT | Performed by: EMERGENCY MEDICINE

## 2021-02-11 PROCEDURE — 80053 COMPREHEN METABOLIC PANEL: CPT | Performed by: EMERGENCY MEDICINE

## 2021-02-11 PROCEDURE — 85610 PROTHROMBIN TIME: CPT | Performed by: EMERGENCY MEDICINE

## 2021-02-11 PROCEDURE — 85025 COMPLETE CBC W/AUTO DIFF WBC: CPT | Performed by: EMERGENCY MEDICINE

## 2021-02-11 PROCEDURE — 25010000002 IOPAMIDOL 61 % SOLUTION: Performed by: COLON & RECTAL SURGERY

## 2021-02-11 PROCEDURE — 85730 THROMBOPLASTIN TIME PARTIAL: CPT | Performed by: EMERGENCY MEDICINE

## 2021-02-11 PROCEDURE — 86900 BLOOD TYPING SEROLOGIC ABO: CPT | Performed by: EMERGENCY MEDICINE

## 2021-02-11 PROCEDURE — 99284 EMERGENCY DEPT VISIT MOD MDM: CPT

## 2021-02-11 PROCEDURE — G0378 HOSPITAL OBSERVATION PER HR: HCPCS

## 2021-02-11 PROCEDURE — 83605 ASSAY OF LACTIC ACID: CPT | Performed by: EMERGENCY MEDICINE

## 2021-02-11 PROCEDURE — C9803 HOPD COVID-19 SPEC COLLECT: HCPCS

## 2021-02-11 PROCEDURE — 74177 CT ABD & PELVIS W/CONTRAST: CPT

## 2021-02-11 PROCEDURE — 0 DIATRIZOATE MEGLUMINE & SODIUM PER 1 ML: Performed by: COLON & RECTAL SURGERY

## 2021-02-11 PROCEDURE — U0003 INFECTIOUS AGENT DETECTION BY NUCLEIC ACID (DNA OR RNA); SEVERE ACUTE RESPIRATORY SYNDROME CORONAVIRUS 2 (SARS-COV-2) (CORONAVIRUS DISEASE [COVID-19]), AMPLIFIED PROBE TECHNIQUE, MAKING USE OF HIGH THROUGHPUT TECHNOLOGIES AS DESCRIBED BY CMS-2020-01-R: HCPCS | Performed by: EMERGENCY MEDICINE

## 2021-02-11 PROCEDURE — 71260 CT THORAX DX C+: CPT

## 2021-02-11 PROCEDURE — 86901 BLOOD TYPING SEROLOGIC RH(D): CPT | Performed by: EMERGENCY MEDICINE

## 2021-02-11 PROCEDURE — 82565 ASSAY OF CREATININE: CPT

## 2021-02-11 RX ORDER — SODIUM CHLORIDE 0.9 % (FLUSH) 0.9 %
10 SYRINGE (ML) INJECTION AS NEEDED
Status: DISCONTINUED | OUTPATIENT
Start: 2021-02-11 | End: 2021-02-12 | Stop reason: HOSPADM

## 2021-02-11 RX ORDER — HYDROCHLOROTHIAZIDE 12.5 MG/1
12.5 TABLET ORAL DAILY
Status: DISCONTINUED | OUTPATIENT
Start: 2021-02-12 | End: 2021-02-12 | Stop reason: HOSPADM

## 2021-02-11 RX ORDER — HYDRALAZINE HYDROCHLORIDE 50 MG/1
100 TABLET, FILM COATED ORAL 2 TIMES DAILY
Status: DISCONTINUED | OUTPATIENT
Start: 2021-02-12 | End: 2021-02-12 | Stop reason: HOSPADM

## 2021-02-11 RX ORDER — PANTOPRAZOLE SODIUM 40 MG/1
40 TABLET, DELAYED RELEASE ORAL
Status: DISCONTINUED | OUTPATIENT
Start: 2021-02-12 | End: 2021-02-12 | Stop reason: HOSPADM

## 2021-02-11 RX ORDER — ATORVASTATIN CALCIUM 20 MG/1
40 TABLET, FILM COATED ORAL DAILY
Status: DISCONTINUED | OUTPATIENT
Start: 2021-02-12 | End: 2021-02-12 | Stop reason: HOSPADM

## 2021-02-11 RX ORDER — DEXTROSE AND SODIUM CHLORIDE 5; .45 G/100ML; G/100ML
50 INJECTION, SOLUTION INTRAVENOUS CONTINUOUS
Status: DISCONTINUED | OUTPATIENT
Start: 2021-02-12 | End: 2021-02-12 | Stop reason: HOSPADM

## 2021-02-11 RX ORDER — ONDANSETRON 2 MG/ML
4 INJECTION INTRAMUSCULAR; INTRAVENOUS EVERY 6 HOURS PRN
Status: DISCONTINUED | OUTPATIENT
Start: 2021-02-11 | End: 2021-02-12 | Stop reason: HOSPADM

## 2021-02-11 RX ORDER — SODIUM CHLORIDE 0.9 % (FLUSH) 0.9 %
10 SYRINGE (ML) INJECTION EVERY 12 HOURS SCHEDULED
Status: DISCONTINUED | OUTPATIENT
Start: 2021-02-12 | End: 2021-02-12 | Stop reason: HOSPADM

## 2021-02-11 RX ORDER — ALLOPURINOL 100 MG/1
100 TABLET ORAL DAILY
Status: DISCONTINUED | OUTPATIENT
Start: 2021-02-12 | End: 2021-02-12 | Stop reason: HOSPADM

## 2021-02-11 RX ADMIN — IOPAMIDOL 85 ML: 612 INJECTION, SOLUTION INTRAVENOUS at 10:21

## 2021-02-11 RX ADMIN — DIATRIZOATE MEGLUMINE AND DIATRIZOATE SODIUM 30 ML: 660; 100 LIQUID ORAL; RECTAL at 10:21

## 2021-02-12 VITALS
OXYGEN SATURATION: 95 % | TEMPERATURE: 97.2 F | BODY MASS INDEX: 33.29 KG/M2 | DIASTOLIC BLOOD PRESSURE: 91 MMHG | WEIGHT: 245.81 LBS | RESPIRATION RATE: 18 BRPM | HEIGHT: 72 IN | SYSTOLIC BLOOD PRESSURE: 157 MMHG | HEART RATE: 72 BPM

## 2021-02-12 LAB
ANION GAP SERPL CALCULATED.3IONS-SCNC: 7.7 MMOL/L (ref 5–15)
BUN SERPL-MCNC: 24 MG/DL (ref 8–23)
BUN/CREAT SERPL: 24 (ref 7–25)
CALCIUM SPEC-SCNC: 8.4 MG/DL (ref 8.6–10.5)
CHLORIDE SERPL-SCNC: 108 MMOL/L (ref 98–107)
CO2 SERPL-SCNC: 26.3 MMOL/L (ref 22–29)
CREAT SERPL-MCNC: 1 MG/DL (ref 0.76–1.27)
DEPRECATED RDW RBC AUTO: 42.1 FL (ref 37–54)
ERYTHROCYTE [DISTWIDTH] IN BLOOD BY AUTOMATED COUNT: 12.8 % (ref 12.3–15.4)
GFR SERPL CREATININE-BSD FRML MDRD: 73 ML/MIN/1.73
GLUCOSE SERPL-MCNC: 106 MG/DL (ref 65–99)
HCT VFR BLD AUTO: 36.1 % (ref 37.5–51)
HGB BLD-MCNC: 11.6 G/DL (ref 13–17.7)
MCH RBC QN AUTO: 28.8 PG (ref 26.6–33)
MCHC RBC AUTO-ENTMCNC: 32.1 G/DL (ref 31.5–35.7)
MCV RBC AUTO: 89.6 FL (ref 79–97)
PLATELET # BLD AUTO: 123 10*3/MM3 (ref 140–450)
PMV BLD AUTO: 10.9 FL (ref 6–12)
POTASSIUM SERPL-SCNC: 3.9 MMOL/L (ref 3.5–5.2)
RBC # BLD AUTO: 4.03 10*6/MM3 (ref 4.14–5.8)
SODIUM SERPL-SCNC: 142 MMOL/L (ref 136–145)
WBC # BLD AUTO: 5.87 10*3/MM3 (ref 3.4–10.8)

## 2021-02-12 PROCEDURE — 85027 COMPLETE CBC AUTOMATED: CPT | Performed by: STUDENT IN AN ORGANIZED HEALTH CARE EDUCATION/TRAINING PROGRAM

## 2021-02-12 PROCEDURE — 99235 HOSP IP/OBS SAME DATE MOD 70: CPT | Performed by: STUDENT IN AN ORGANIZED HEALTH CARE EDUCATION/TRAINING PROGRAM

## 2021-02-12 PROCEDURE — G0378 HOSPITAL OBSERVATION PER HR: HCPCS

## 2021-02-12 PROCEDURE — 80048 BASIC METABOLIC PNL TOTAL CA: CPT | Performed by: STUDENT IN AN ORGANIZED HEALTH CARE EDUCATION/TRAINING PROGRAM

## 2021-02-12 RX ORDER — LOSARTAN POTASSIUM 100 MG/1
100 TABLET ORAL
Status: DISCONTINUED | OUTPATIENT
Start: 2021-02-12 | End: 2021-02-12 | Stop reason: HOSPADM

## 2021-02-12 RX ORDER — TRAMADOL HYDROCHLORIDE 50 MG/1
50 TABLET ORAL EVERY 6 HOURS PRN
Qty: 20 TABLET | Refills: 0 | Status: SHIPPED | OUTPATIENT
Start: 2021-02-12 | End: 2021-03-15

## 2021-02-12 RX ORDER — INDOMETHACIN 75 MG/1
75 CAPSULE, EXTENDED RELEASE ORAL
Status: DISCONTINUED | OUTPATIENT
Start: 2021-02-12 | End: 2021-02-12 | Stop reason: HOSPADM

## 2021-02-12 RX ADMIN — HYDROCHLOROTHIAZIDE 12.5 MG: 12.5 CAPSULE ORAL at 09:11

## 2021-02-12 RX ADMIN — DEXTROSE AND SODIUM CHLORIDE 50 ML/HR: 5; 450 INJECTION, SOLUTION INTRAVENOUS at 01:09

## 2021-02-12 RX ADMIN — HYDRALAZINE HYDROCHLORIDE 100 MG: 50 TABLET, FILM COATED ORAL at 09:11

## 2021-02-12 RX ADMIN — Medication 10 ML: at 01:11

## 2021-02-12 RX ADMIN — ATORVASTATIN CALCIUM 40 MG: 20 TABLET, FILM COATED ORAL at 09:11

## 2021-02-12 NOTE — PROGRESS NOTES
Case Management Discharge Note      Final Note: Home---no needs         Selected Continued Care - Admitted Since 2/11/2021     Destination    No services have been selected for the patient.              Durable Medical Equipment    No services have been selected for the patient.              Dialysis/Infusion    No services have been selected for the patient.              Home Medical Care    No services have been selected for the patient.              Therapy    No services have been selected for the patient.              Community Resources    No services have been selected for the patient.                       Final Discharge Disposition Code: 01 - home or self-care

## 2021-02-12 NOTE — TELEPHONE ENCOUNTER
Spoke with daughter earlier this evening; called and stated patient was having significant bleeding and clot from the wound after his CT scan earlier today. He is coming to the ER for further evaluation.

## 2021-02-12 NOTE — ED NOTES
.  Nursing report ED to floor  Darwin Sanchez  75 y.o.  male    HPI (triage note):   Chief Complaint   Patient presents with   • Wound Check       Admitting doctor:   Ary Patel MD    Admitting diagnosis:   The primary encounter diagnosis was Perirectal ulcer. Diagnoses of Bleeding from wound, History of hypertension, and History of hyperlipidemia were also pertinent to this visit.    Code status:   Current Code Status     Date Active Code Status Order ID Comments User Context       2/11/2021 2149 CPR [id number]  Ary Patel MD ED     Advance Care Planning Activity      Questions for Current Code Status     Question Answer Comment    Code Status CPR     Medical Interventions (Level of Support Prior to Arrest) Full     Level Of Support Discussed With Patient           Allergies:   Hydrocodone-acetaminophen    Weight:       02/11/21 2206   Weight: 111 kg (245 lb)       Most recent vitals:   Vitals:    02/11/21 2026 02/11/21 2120 02/11/21 2206 02/11/21 2212   BP: 154/86   149/88   Patient Position: Lying   Lying   Pulse: 77 71  73   Resp: 17   17   Temp:       TempSrc:       SpO2: 97% 95%  95%   Weight:   111 kg (245 lb)    Height:           Active LDAs/IV Access:   Lines, Drains & Airways    Active LDAs     Name:   Placement date:   Placement time:   Site:   Days:    Peripheral IV 10/17/18 0937 Left Antecubital   10/17/18    0937    Antecubital   848    Peripheral IV 02/11/21 2054 Right Antecubital   02/11/21 2054    Antecubital   less than 1                Labs (abnormal labs have a star):   Labs Reviewed   COMPREHENSIVE METABOLIC PANEL - Abnormal; Notable for the following components:       Result Value    Glucose 101 (*)     BUN 29 (*)     All other components within normal limits    Narrative:     GFR Normal >60  Chronic Kidney Disease <60  Kidney Failure <15     CBC WITH AUTO DIFFERENTIAL - Abnormal; Notable for the following components:    Monocyte % 12.4 (*)     All other components within normal  limits   COVID-19,[facility] IN-HOUSE CEPHEID, NP SWAB IN TRANSPORT MEDIA 8-12 HR TAT - Normal    Narrative:     Fact sheet for providers: https://www.fda.gov/media/666074/download     Fact sheet for patients: https://www.fda.gov/media/831637/download   LACTIC ACID, PLASMA - Normal   PROTIME-INR - Normal   APTT - Normal   COVID PRE-OP / PRE-PROCEDURE SCREENING ORDER (NO ISOLATION)    Narrative:     The following orders were created for panel order COVID PRE-OP / PRE-PROCEDURE SCREENING ORDER (NO ISOLATION) - Swab, Nasopharynx.  Procedure                               Abnormality         Status                     ---------                               -----------         ------                     COVID-19,[facility] IN-HOUSE...[[id number]]  Normal              Final result                 Please view results for these tests on the individual orders.   TYPE AND SCREEN   CBC AND DIFFERENTIAL    Narrative:     The following orders were created for panel order CBC & Differential.  Procedure                               Abnormality         Status                     ---------                               -----------         ------                     CBC Auto Differential[[id number]]        Abnormal            Final result                 Please view results for these tests on the individual orders.       EKG:   No orders to display       Meds given in ED:   Medications - No data to display    Imaging results:  Ct Chest With Contrast Diagnostic    Result Date: 2/11/2021  1.  Sub-6 mm pulmonary nodule within the right lower lobe as well as mildly enlarged bilateral common iliac nodes. Findings are indeterminate given patient history. In the absence of prior imaging to establish long-term stability, further evaluation with CT chest, abdomen and pelvis in 3 months is recommended to ensure stability and exclude metastatic disease. Evaluation of lymph nodes with PET/CT may be helpful if clinically indicated. 2.  Skin defect  contiguous with the posterior aspect of the distal anus is present and likely corresponds with the reported postradiation nonhealing wound. Unfortunately, I cannot determine if there is an underlying perianal fistula contiguous with this skin defect with CT and findings can be further evaluated with MRI of the pelvis with and without contrast perianal fistula protocol if clinically indicated. 3.  Dilation of the ascending aorta measuring approximately 4.4 cm. 4.  Other findings as above.        Ct Abdomen Pelvis With Contrast    Result Date: 2/11/2021  1.  Sub-6 mm pulmonary nodule within the right lower lobe as well as mildly enlarged bilateral common iliac nodes. Findings are indeterminate given patient history. In the absence of prior imaging to establish long-term stability, further evaluation with CT chest, abdomen and pelvis in 3 months is recommended to ensure stability and exclude metastatic disease. Evaluation of lymph nodes with PET/CT may be helpful if clinically indicated. 2.  Skin defect contiguous with the posterior aspect of the distal anus is present and likely corresponds with the reported postradiation nonhealing wound. Unfortunately, I cannot determine if there is an underlying perianal fistula contiguous with this skin defect with CT and findings can be further evaluated with MRI of the pelvis with and without contrast perianal fistula protocol if clinically indicated. 3.  Dilation of the ascending aorta measuring approximately 4.4 cm. 4.  Other findings as above.          Ambulatory status:   -     Social issues:   Social History     Socioeconomic History   • Marital status:      Spouse name: Not on file   • Number of children: Not on file   • Years of education: Not on file   • Highest education level: Not on file   Tobacco Use   • Smoking status: Never Smoker   • Smokeless tobacco: Never Used   Substance and Sexual Activity   • Alcohol use: No   • Drug use: No   • Sexual activity: Yes      Comment: .    Nursing report ED to floor       Landy Kim RN  02/11/21 1372

## 2021-02-12 NOTE — DISCHARGE SUMMARY
DATE OF ADMISSION:  2/12/2021  DATE OF DISCHARGE:  2/13/2021    ATTENDING:        Ary Patel M.D.       GENERAL SURGERY      CONSULTS:    None    PRINCIPAL DIAGNOSIS:     Nonhealing perianal wound    DISCHARGE DIAGNOSES:     Nonhealing perianal wound  Bleeding from wound    HOSPITAL PROCEDURES:     None    HOSPITALCOURSE:   Mr. Garcia is a 75-year-old gentleman who was seen by Dr. Ocasio recently for a nonhealing perianal wound.  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  His wound is worsened over the last few weeks.  It began to bleed significantly last night and he presented to the emergency room.  With packing and normal saline dressing changes, the wound became hemostatic.  His hemoglobin drifted approximately 1.5 grams. Overnight, there was no further bleeding from the wound. His daughter would like to take him home today. She is a nurse practitioner and comfortable with his wound care. They know to return to the emergency room or call with any further significant bleeding.  They will follow-up with Dr. Ocasio next week.    ACTIVITY:  Shower after bowel movements. Pat dry.     DIET:  Regular     FOLLOW UP:  With Dr. Ocasio

## 2021-02-12 NOTE — ED NOTES
Pt to triage from home with c/o perianal wound - had perianal surgery 8 years ago, had some diarrhea after ct scan today, and has had large clots from the open perianal wound.  Pt provided with mask in triage.  Triage personnel wore appropriate PPE, mask, gloves and goggles         Danielle Ledbetter RN  02/11/21 2006

## 2021-02-12 NOTE — ED NOTES
Pt has a perianal wound that started bleeding around 2pm today after his CT. Pt also had one episode of diarrhea at that time. Pt states wound drains clear fluid normally and has not had bleeding like this before. Pt currently has a brief with pads that are saturated. He has saturated about 3 briefs in a few hours. Pt denies any lightheadedness or any other sx at this time. This rn wearing mask and goggles. Pt wearing mask during encounter.         Dania Barker, ALEX  02/11/21 2025

## 2021-02-12 NOTE — ED PROVIDER NOTES
EMERGENCY DEPARTMENT ENCOUNTER    Room Number:  P383/1  Date of encounter:  2/12/2021  PCP: Niko aPtrick MD  Historian: Patient, daughter      HPI:  Chief Complaint: Bleeding maricel-anal wound  A complete HPI/ROS/PMH/PSH/SH/FH are unobtainable due to: None    Context: Darwin Sanchez is a 75 y.o. male who presents to the ED via private vehicle for evaluation of a bleeding wound in his perirectal/perianal region.  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  Has had chronic wounds in that area since then.  Has had worsening in the last few weeks with increased drainage and pain with change in underlying skin condition.  Patient states that he saw Dr. Ocasio recently who sent him for CT today.  Patient developed bleeding from the wound after CT scan today associated with diarrhea.  Has had large clots from that area.  Denies any abdominal pain, nausea with vomiting, dysuria, hematuria.  Denies chest pain or shortness of breath.  Denies anticoagulation.      MEDICAL RECORD REVIEW    CT abdomen pelvis 2/11/2021    IMPRESSION:  1.  Sub-6 mm pulmonary nodule within the right lower lobe as well as  mildly enlarged bilateral common iliac nodes. Findings are indeterminate  given patient history. In the absence of prior imaging to establish  long-term stability, further evaluation with CT chest, abdomen and  pelvis in 3 months is recommended to ensure stability and exclude  metastatic disease. Evaluation of lymph nodes with PET/CT may be helpful  if clinically indicated.  2.  Skin defect contiguous with the posterior aspect of the distal anus  is present and likely corresponds with the reported postradiation  nonhealing wound. Unfortunately, I cannot determine if there is an  underlying perianal fistula contiguous with this skin defect with CT and  findings can be further evaluated with MRI of the  pelvis with and  without contrast perianal fistula protocol if clinically indicated.  3.  Dilation of the ascending aorta measuring approximately 4.4 cm.  4.  Other findings as above.    PAST MEDICAL HISTORY  Active Ambulatory Problems     Diagnosis Date Noted   • Primary osteoarthritis of left knee 10/10/2018   • Osteoarthritis of knee 10/17/2018   • Psoriasis with arthropathy (CMS/HCC) 07/06/2019   • Primary malignant neoplasm of skin 07/06/2019   • Overweight 07/06/2019   • Impaired fasting glucose 07/06/2019   • Hypercholesterolemia 07/06/2019   • Gouty arthropathy 07/06/2019   • Carotid artery occlusion 07/06/2019   • Benign essential hypertension 07/06/2019   • Squamous cell carcinoma of anal margin 01/22/2021   • Hypertension 01/22/2021   • Arthritis 01/22/2021   • Anal cancer (CMS/HCC) 01/29/2021     Resolved Ambulatory Problems     Diagnosis Date Noted   • No Resolved Ambulatory Problems     Past Medical History:   Diagnosis Date   • Anxiety    • Chest pain 02/2017   • Coronavirus infection 04/2020   • Dyspnea 04/2017   • ED (erectile dysfunction)    • Fish hook injury of finger 09/04/2017   • Gout    • History of chemotherapy    • History of radiation therapy    • Hyperlipidemia 7/6/2019   • OA (osteoarthritis)    • Osteoporosis    • Radiation dermatitis 01/2020   • Rheumatoid arthritis (CMS/HCC)    • SCC (squamous cell carcinoma) 01/2020         PAST SURGICAL HISTORY  Past Surgical History:   Procedure Laterality Date   • COLONOSCOPY  2018   • KNEE ARTHROSCOPY Bilateral     OVER 35 YRS AGO, DR. CACERES   • RECTAL EXAMINATION UNDER ANESTHESIA N/A 03/09/2018    WITH BIOPSIES OF ANUS, DR. LINA ISIDRO AT [facility]   • SQUAMOUS CELL CARCINOMA EXCISION N/A 09/23/2016    DIETER RECTAL AREA, DR. LINA ISIDRO AT [facility]   • TOTAL KNEE ARTHROPLASTY Right 10/17/2018    Procedure: TOTAL KNEE ARTHROPLASTY;  Surgeon: Cooper Ruby MD;  Location: [facility] OR;  Service: Orthopedics         FAMILY HISTORY  Family  History   Problem Relation Age of Onset   • Heart disease Mother    • Hypertension Mother    • Heart disease Father    • Hypertension Father    • Cancer Father         prostate.   • Aneurysm Brother    • Malig Hyperthermia Neg Hx          SOCIAL HISTORY  Social History     Socioeconomic History   • Marital status:      Spouse name: Not on file   • Number of children: Not on file   • Years of education: Not on file   • Highest education level: Not on file   Tobacco Use   • Smoking status: Never Smoker   • Smokeless tobacco: Never Used   Substance and Sexual Activity   • Alcohol use: No   • Drug use: No   • Sexual activity: Yes     Comment: .         ALLERGIES  Hydrocodone-acetaminophen        REVIEW OF SYSTEMS  Review of Systems     All systems reviewed and negative except for those discussed in HPI.       PHYSICAL EXAM    I have reviewed the triage vital signs and nursing notes.    ED Triage Vitals   Temp Heart Rate Resp BP SpO2   02/11/21 2004 02/11/21 2004 02/11/21 2004 02/11/21 2026 02/11/21 2004   97.5 °F (36.4 °C) 80 16 154/86 96 %      Temp src Heart Rate Source Patient Position BP Location FiO2 (%)   02/11/21 2004 02/11/21 2004 02/11/21 2026 -- --   Tympanic Monitor Lying         Physical Exam  General: Awake, alert, no acute distress, nontoxic, nondiaphoretic  HEENT: Mucous membranes moist, atraumatic, EOMI, no conjunctival pallor  Neck: Full ROM  Pulm: Symmetric chest rise, nonlabored, lungs CTAB  Cardiovascular: Regular rate and rhythm, intact distal pulses  GI: Soft, nontender, nondistended, no rebound, no guarding, bowel sounds present  Rectal: Open wound in the right gluteal cleft with large amount of blood clot noted  MSK: Full ROM, no deformity  Skin: Warm, dry  Neuro: Alert and oriented x 3, GCS 15, moving all extremities, no focal deficits  Psych: Calm, cooperative      Surgical mask, protective eye goggles, and gloves used during this encounter. Patient in surgical mask.      LAB  RESULTS  Recent Results (from the past 24 hour(s))   Comprehensive Metabolic Panel    Collection Time: 02/11/21  8:51 PM    Specimen: Blood   Result Value Ref Range    Glucose 101 (H) 65 - 99 mg/dL    BUN 29 (H) 8 - 23 mg/dL    Creatinine 1.18 0.76 - 1.27 mg/dL    Sodium 140 136 - 145 mmol/L    Potassium 3.9 3.5 - 5.2 mmol/L    Chloride 105 98 - 107 mmol/L    CO2 27.0 22.0 - 29.0 mmol/L    Calcium 9.0 8.6 - 10.5 mg/dL    Total Protein 7.0 6.0 - 8.5 g/dL    Albumin 3.80 3.50 - 5.20 g/dL    ALT (SGPT) 15 1 - 41 U/L    AST (SGOT) 17 1 - 40 U/L    Alkaline Phosphatase 77 39 - 117 U/L    Total Bilirubin 0.4 0.0 - 1.2 mg/dL    eGFR Non African Amer 61 >60 mL/min/1.73    Globulin 3.2 gm/dL    A/G Ratio 1.2 g/dL    BUN/Creatinine Ratio 24.6 7.0 - 25.0    Anion Gap 8.0 5.0 - 15.0 mmol/L   Type & Screen    Collection Time: 02/11/21  8:51 PM    Specimen: Blood   Result Value Ref Range    ABO Type A     RH type Positive     Antibody Screen Negative     T&S Expiration Date 2/14/2021 11:59:59 PM    Lactic Acid, Plasma    Collection Time: 02/11/21  8:51 PM    Specimen: Blood   Result Value Ref Range    Lactate 1.1 0.5 - 2.0 mmol/L   CBC Auto Differential    Collection Time: 02/11/21  8:51 PM    Specimen: Blood   Result Value Ref Range    WBC 5.97 3.40 - 10.80 10*3/mm3    RBC 4.61 4.14 - 5.80 10*6/mm3    Hemoglobin 13.4 13.0 - 17.7 g/dL    Hematocrit 40.0 37.5 - 51.0 %    MCV 86.8 79.0 - 97.0 fL    MCH 29.1 26.6 - 33.0 pg    MCHC 33.5 31.5 - 35.7 g/dL    RDW 12.9 12.3 - 15.4 %    RDW-SD 40.1 37.0 - 54.0 fl    MPV 11.1 6.0 - 12.0 fL    Platelets 155 140 - 450 10*3/mm3    Neutrophil % 59.6 42.7 - 76.0 %    Lymphocyte % 24.1 19.6 - 45.3 %    Monocyte % 12.4 (H) 5.0 - 12.0 %    Eosinophil % 2.8 0.3 - 6.2 %    Basophil % 0.8 0.0 - 1.5 %    Immature Grans % 0.3 0.0 - 0.5 %    Neutrophils, Absolute 3.55 1.70 - 7.00 10*3/mm3    Lymphocytes, Absolute 1.44 0.70 - 3.10 10*3/mm3    Monocytes, Absolute 0.74 0.10 - 0.90 10*3/mm3    Eosinophils,  Absolute 0.17 0.00 - 0.40 10*3/mm3    Basophils, Absolute 0.05 0.00 - 0.20 10*3/mm3    Immature Grans, Absolute 0.02 0.00 - 0.05 10*3/mm3    nRBC 0.0 0.0 - 0.2 /100 WBC   Protime-INR    Collection Time: 02/11/21  8:51 PM    Specimen: Blood   Result Value Ref Range    Protime 12.5 11.7 - 14.2 Seconds    INR 0.95 0.90 - 1.10   aPTT    Collection Time: 02/11/21  8:51 PM    Specimen: Blood   Result Value Ref Range    PTT 28.2 22.7 - 35.4 seconds   COVID-19,[facility] IN-HOUSE CEPHEID, NP SWAB IN TRANSPORT MEDIA 8-12 HR TAT - Swab, Nasopharynx    Collection Time: 02/11/21  8:55 PM    Specimen: Nasopharynx; Swab   Result Value Ref Range    COVID19 Not Detected Not Detected - Ref. Range       Ordered the above labs and independently reviewed the results.        RADIOLOGY  Ct Chest With Contrast Diagnostic    Result Date: 2/11/2021  CT CHEST, ABDOMEN, AND PELVIS WITH IV CONTRAST  HISTORY: Anal cancer treated with chemoradiation approximately 8 years ago and reportedly the patient is having increased drainage with report of a postradiation wound which has never healed.  TECHNIQUE: Radiation dose reduction techniques were utilized, including automated exposure control and exposure modulation based on body size. 3 mm images were obtained through the chest, abdomen, and pelvis with IV contrast.  COMPARISON: None available.  FINDINGS:  CHEST:    There is no hilar, mediastinal or axillary adenopathy by size criteria. The ascending aorta is dilated, measuring approximately 4.4 cm in diameter. There is mild to moderate coronary artery calcification. No significant pericardial effusion is present.  There is no pulmonary consolidation, pleural effusion or pneumothorax. Sub-6 mm pulmonary nodule is present within the right lower lobe.  ABDOMEN AND PELVIS:    There are no findings of small bowel obstruction. The appendix is unremarkable.  Cholelithiasis is present. The gallbladder is otherwise nondistended. The liver, pancreas, spleen and  adrenal glands have an unremarkable postcontrast CT appearance. Subcentimeter renal lesions are too small to characterize. Larger lesion along the posterior aspect of the midpole of the right kidney measures less than 15 Hounsfield units in density and is likely benign per current Bosniak guidelines. There is no hydronephrosis.  There are mildly enlarged bilateral common iliac nodes measuring up to 1.1 cm in short axis dimension.  There is a skin defect contiguous with the posterior aspect of the distal anus at approximately the 6 o'clock position with underlying fat stranding and soft tissue thickening. Please note that the pelvic floor and perineum, including the distal rectum and anus, are incompletely evaluated with CT.  There is colonic diverticulosis. The bladder is decompressed and not well evaluated. No free intraperitoneal air or fluid is present.  BONE WINDOWS: There are no suspicious lytic or blastic osseous lesions in the chest.      1.  Sub-6 mm pulmonary nodule within the right lower lobe as well as mildly enlarged bilateral common iliac nodes. Findings are indeterminate given patient history. In the absence of prior imaging to establish long-term stability, further evaluation with CT chest, abdomen and pelvis in 3 months is recommended to ensure stability and exclude metastatic disease. Evaluation of lymph nodes with PET/CT may be helpful if clinically indicated. 2.  Skin defect contiguous with the posterior aspect of the distal anus is present and likely corresponds with the reported postradiation nonhealing wound. Unfortunately, I cannot determine if there is an underlying perianal fistula contiguous with this skin defect with CT and findings can be further evaluated with MRI of the pelvis with and without contrast perianal fistula protocol if clinically indicated. 3.  Dilation of the ascending aorta measuring approximately 4.4 cm. 4.  Other findings as above.        Ct Abdomen Pelvis With  Contrast    Result Date: 2/11/2021  CT CHEST, ABDOMEN, AND PELVIS WITH IV CONTRAST  HISTORY: Anal cancer treated with chemoradiation approximately 8 years ago and reportedly the patient is having increased drainage with report of a postradiation wound which has never healed.  TECHNIQUE: Radiation dose reduction techniques were utilized, including automated exposure control and exposure modulation based on body size. 3 mm images were obtained through the chest, abdomen, and pelvis with IV contrast.  COMPARISON: None available.  FINDINGS:  CHEST:    There is no hilar, mediastinal or axillary adenopathy by size criteria. The ascending aorta is dilated, measuring approximately 4.4 cm in diameter. There is mild to moderate coronary artery calcification. No significant pericardial effusion is present.  There is no pulmonary consolidation, pleural effusion or pneumothorax. Sub-6 mm pulmonary nodule is present within the right lower lobe.  ABDOMEN AND PELVIS:    There are no findings of small bowel obstruction. The appendix is unremarkable.  Cholelithiasis is present. The gallbladder is otherwise nondistended. The liver, pancreas, spleen and adrenal glands have an unremarkable postcontrast CT appearance. Subcentimeter renal lesions are too small to characterize. Larger lesion along the posterior aspect of the midpole of the right kidney measures less than 15 Hounsfield units in density and is likely benign per current Bosniak guidelines. There is no hydronephrosis.  There are mildly enlarged bilateral common iliac nodes measuring up to 1.1 cm in short axis dimension.  There is a skin defect contiguous with the posterior aspect of the distal anus at approximately the 6 o'clock position with underlying fat stranding and soft tissue thickening. Please note that the pelvic floor and perineum, including the distal rectum and anus, are incompletely evaluated with CT.  There is colonic diverticulosis. The bladder is decompressed  and not well evaluated. No free intraperitoneal air or fluid is present.  BONE WINDOWS: There are no suspicious lytic or blastic osseous lesions in the chest.      1.  Sub-6 mm pulmonary nodule within the right lower lobe as well as mildly enlarged bilateral common iliac nodes. Findings are indeterminate given patient history. In the absence of prior imaging to establish long-term stability, further evaluation with CT chest, abdomen and pelvis in 3 months is recommended to ensure stability and exclude metastatic disease. Evaluation of lymph nodes with PET/CT may be helpful if clinically indicated. 2.  Skin defect contiguous with the posterior aspect of the distal anus is present and likely corresponds with the reported postradiation nonhealing wound. Unfortunately, I cannot determine if there is an underlying perianal fistula contiguous with this skin defect with CT and findings can be further evaluated with MRI of the pelvis with and without contrast perianal fistula protocol if clinically indicated. 3.  Dilation of the ascending aorta measuring approximately 4.4 cm. 4.  Other findings as above.          I ordered the above noted radiological studies. Reviewed by me.  See dictation for official radiology interpretation.      PROCEDURES    Procedures      MEDICATIONS GIVEN IN ER    Medications   allopurinol (ZYLOPRIM) tablet 100 mg (has no administration in time range)   atorvastatin (LIPITOR) tablet 40 mg (has no administration in time range)   hydrALAZINE (APRESOLINE) tablet 100 mg (100 mg Oral Not Given 2/12/21 0101)   hydroCHLOROthiazide (HYDRODIURIL) oral 12.5 mg (has no administration in time range)   pantoprazole (PROTONIX) EC tablet 40 mg (has no administration in time range)   sodium chloride 0.9 % flush 10 mL (10 mL Intravenous Given 2/12/21 0111)   sodium chloride 0.9 % flush 10 mL (has no administration in time range)   dextrose 5 % and sodium chloride 0.45 % infusion (50 mL/hr Intravenous New Bag  2/12/21 0109)   ondansetron (ZOFRAN) injection 4 mg (has no administration in time range)         PROGRESS, DATA ANALYSIS, CONSULTS, AND MEDICAL DECISION MAKING    All labs have been independently reviewed by me.  All radiology studies have been reviewed by me and discussed with radiologist dictating the report.   EKG's independently viewed and interpreted by me.  Discussion below represents my analysis of pertinent findings related to patient's condition, differential diagnosis, treatment plan and final disposition.    I have reviewed CT findings from earlier today, plan for labs and surgical consult likely with hospitalization.  Patient is overall fairly comfortable and not in severe pain, no active bleeding noted.    ED Course as of Feb 12 0118   Thu Feb 11, 2021 2122 WBC: 5.97 [DC]   2123 Hemoglobin: 13.4 [DC]   2123 Lactate: 1.1 [DC]   2138 Discussed with Dr. Patel, Surgery, discussed patient's clinical course and findings today, and at this point we will hospitalize the surgery service for further monitoring and care.    [DC]      ED Course User Index  [DC] Clifton Millan MD       AS OF 01:18 EST VITALS:    BP - 175/82  HR - 69  TEMP - 97.9 °F (36.6 °C) (Oral)  02 SATS - 93%        DIAGNOSIS  Final diagnoses:   Perirectal ulcer   Bleeding from wound   History of hypertension   History of hyperlipidemia         DISPOSITION  HOSPITALIZATION    Discussed treatment plan and reason for hospitalization with pt/family and hospitalizing physician.  Pt/family voiced understanding of the plan for hospitalization for further testing/treatment as needed.                  Clifton Millan MD  02/12/21 0118

## 2021-02-12 NOTE — ED NOTES
This rn cleaned pt wound for further assessment. There was a large clot and continuous bleeding coming from the wound. Daughter took picture with patient request for comparison from a month ago. Wound is bigger by a few cm and much deeper than image from a month ago. Pt is very tender around area with touch and movement.      Dania Barker, RN  02/11/21 2127

## 2021-02-12 NOTE — H&P
General Surgery history and physical exam    Summary:    Mr. Darwin Sanchez is a 75 y.o. year old gentleman who presented to the emergency room with bleeding from a nonhealing perineal wound.  Hemoglobin 13.5.  Pack wound as needed.  Will follow-up morning labs.  Will plan for exam under anesthesia if continued bleeding with conservative measures.  Will discuss with his daughter in the morning.    Chief Complaint:    Bleeding from wound    History of Present Illness:    Mr. Darwin Sanchez is a 75 y.o. year old gentleman who has a history of anal squamous cell carcinoma diagnosed 8 years ago now status post excision and chemoradiation.  He has had a chronic wound at his excision site since then.  Over the last 6 months it is worsened in size and started to drain large amount of fluid.  Tonight the fluid became bloody and he noticed several clots.  This caused him to go to the emergency room.  He was asymptomatic without syncope or lightheadedness.    He had this wound examined approximately 3 years ago and a biopsy was done that was benign per his report.  He is seeing wound care for possible hyperbaric treatment.  He also saw [facility].    Past Medical History:   • History of anal squamous cell carcinoma  • Hypertension  • Hyperlipidemia    Past Surgical History:    • Bilateral knee arthroscopy  • Rectal exam under anesthesia with biopsies by Dr. Kelsey in 2018  • Anal squamous cell carcinoma excision by Dr. Kelsey in 2016    Family History:    • No family history of colon cancer    Social History:    • Denies tobacco use  • Denies alcohol use    Allergies:   Allergies   Allergen Reactions   • Hydrocodone-Acetaminophen Itching, Mental Status Change and Hallucinations     hallucinations       Medications:     Current Facility-Administered Medications:   •  allopurinol (ZYLOPRIM) tablet 100 mg, 100 mg, Oral, Daily, Ary Patel MD  •  atorvastatin (LIPITOR) tablet 40 mg, 40 mg, Oral, Daily, Ary Patel MD  •   dextrose 5 % and sodium chloride 0.45 % infusion, 50 mL/hr, Intravenous, Continuous, Ary Patel MD  •  hydrALAZINE (APRESOLINE) tablet 100 mg, 100 mg, Oral, BID, Ary Patel MD  •  hydroCHLOROthiazide (HYDRODIURIL) oral 12.5 mg, 12.5 mg, Oral, Daily, Ary Patel MD  •  ondansetron (ZOFRAN) injection 4 mg, 4 mg, Intravenous, Q6H PRN, Ary Patel MD  •  pantoprazole (PROTONIX) EC tablet 40 mg, 40 mg, Oral, Q AM, Ary Patel MD  •  sodium chloride 0.9 % flush 10 mL, 10 mL, Intravenous, Q12H, Ary Patel MD  •  sodium chloride 0.9 % flush 10 mL, 10 mL, Intravenous, PRN, Ary Patel MD    Facility-Administered Medications Ordered in Other Encounters:   •  mupirocin (BACTROBAN) 2 % nasal ointment, , Nasal, BID, Cooper Ruby MD    Radiology/Endoscopy/Labs:    • White blood cell count 5.9 hemoglobin 13.4 platelets 155 creatinine 1.1  • CT scan from earlier today reviewed; small pulmonary nodule in the right lower lobe, perianal skin defect    Review of Systems:   Influenza-like illness: no fever, no  cough, no  sore throat, no  body aches, no loss of sense of taste or smell, no known exposure to person with Covid-19.  Constitutional: Negative for fevers or chills  HENT: Negative for hearing loss or runny nose  Eyes: Negative for vision changes or scleral icterus  Respiratory: Negative for cough or shortness of breath  Cardiovascular: Negative for chest pain or heart palpitations  Gastrointestinal: Negative for abdominal pain, nausea, vomiting, constipation, melena, or hematochezia  Genitourinary: Negative for hematuria or dysuria  Musculoskeletal: Negative for joint swelling or gait instability  Neurologic: Negative for tremors or seizures  Psychiatric: Negative for suicidal ideations or depression  All other systems reviewed and negative    Physical Exam:   • Constitutional: Well-developed, well-nourished, no acute distress  • Vital signs: Temperature 97.9 heart rate 69 respiratory  rate 20 blood pressure 175/82  • Eyes: Conjunctiva normal, sclera nonicteric  • ENMT: Hearing grossly normal, oral mucosa moist  • Neck: Supple, trachea midline  • Respiratory: On room air, no increased work of breathing, normal inspiratory effort  • Cardiovascular: Regular rate, no peripheral edema, no jugular venous distention  • Gastrointestinal: Soft, nontender  • Rectal exam: 2 to 3 o'clock position large open wound, no obvious bleeding at this time, gauze with a few clots  • Skin:  Warm, dry, no rash on visualized skin surfaces  • Musculoskeletal: Symmetric strength, normal gait  • Psychiatric: Alert and oriented ×3, normal affect     URIEL BAINS M.D.  General and Endoscopic Surgery  [facility]    [address], [address]  [place], [place], [address]  P: [phone]  F: [phone]

## 2021-02-13 LAB — CREAT BLDA-MCNC: 1.3 MG/DL (ref 0.6–1.3)

## 2021-02-16 ENCOUNTER — TRANSCRIBE ORDERS (OUTPATIENT)
Dept: PREADMISSION TESTING | Facility: HOSPITAL | Age: 76
End: 2021-02-16

## 2021-02-16 DIAGNOSIS — Z01.818 OTHER SPECIFIED PRE-OPERATIVE EXAMINATION: Primary | ICD-10-CM

## 2021-02-19 ENCOUNTER — PRE-ADMISSION TESTING (OUTPATIENT)
Dept: PREADMISSION TESTING | Facility: HOSPITAL | Age: 76
End: 2021-02-19

## 2021-02-19 VITALS
TEMPERATURE: 98.4 F | HEIGHT: 72 IN | RESPIRATION RATE: 18 BRPM | OXYGEN SATURATION: 98 % | WEIGHT: 248 LBS | BODY MASS INDEX: 33.59 KG/M2 | SYSTOLIC BLOOD PRESSURE: 165 MMHG | HEART RATE: 86 BPM | DIASTOLIC BLOOD PRESSURE: 99 MMHG

## 2021-02-19 LAB
ANION GAP SERPL CALCULATED.3IONS-SCNC: 9.6 MMOL/L (ref 5–15)
BUN SERPL-MCNC: 20 MG/DL (ref 8–23)
BUN/CREAT SERPL: 16.9 (ref 7–25)
CALCIUM SPEC-SCNC: 9.4 MG/DL (ref 8.6–10.5)
CHLORIDE SERPL-SCNC: 106 MMOL/L (ref 98–107)
CO2 SERPL-SCNC: 26.4 MMOL/L (ref 22–29)
CREAT SERPL-MCNC: 1.18 MG/DL (ref 0.76–1.27)
DEPRECATED RDW RBC AUTO: 39 FL (ref 37–54)
ERYTHROCYTE [DISTWIDTH] IN BLOOD BY AUTOMATED COUNT: 12.4 % (ref 12.3–15.4)
GFR SERPL CREATININE-BSD FRML MDRD: 61 ML/MIN/1.73
GLUCOSE SERPL-MCNC: 94 MG/DL (ref 65–99)
HCT VFR BLD AUTO: 40.8 % (ref 37.5–51)
HGB BLD-MCNC: 13.4 G/DL (ref 13–17.7)
MCH RBC QN AUTO: 28.4 PG (ref 26.6–33)
MCHC RBC AUTO-ENTMCNC: 32.8 G/DL (ref 31.5–35.7)
MCV RBC AUTO: 86.4 FL (ref 79–97)
PLATELET # BLD AUTO: 178 10*3/MM3 (ref 140–450)
PMV BLD AUTO: 10.6 FL (ref 6–12)
POTASSIUM SERPL-SCNC: 4 MMOL/L (ref 3.5–5.2)
QT INTERVAL: 399 MS
RBC # BLD AUTO: 4.72 10*6/MM3 (ref 4.14–5.8)
SODIUM SERPL-SCNC: 142 MMOL/L (ref 136–145)
WBC # BLD AUTO: 6.45 10*3/MM3 (ref 3.4–10.8)

## 2021-02-19 PROCEDURE — 85027 COMPLETE CBC AUTOMATED: CPT

## 2021-02-19 PROCEDURE — 36415 COLL VENOUS BLD VENIPUNCTURE: CPT

## 2021-02-19 PROCEDURE — 93010 ELECTROCARDIOGRAM REPORT: CPT | Performed by: INTERNAL MEDICINE

## 2021-02-19 PROCEDURE — 93005 ELECTROCARDIOGRAM TRACING: CPT

## 2021-02-19 PROCEDURE — 80048 BASIC METABOLIC PNL TOTAL CA: CPT

## 2021-02-19 RX ORDER — COLCHICINE 0.6 MG/1
0.6 TABLET ORAL DAILY PRN
COMMUNITY
End: 2021-05-06 | Stop reason: SDDI

## 2021-02-19 RX ORDER — CHLORHEXIDINE GLUCONATE 500 MG/1
1 CLOTH TOPICAL TAKE AS DIRECTED
COMMUNITY
End: 2021-02-23 | Stop reason: HOSPADM

## 2021-02-19 NOTE — DISCHARGE INSTRUCTIONS
Take the following medications the morning of surgery:  HYDRALAZINE, TRAMADOL IF NEEDED      Arrive to hospital on your day of surgery at 5:30 AM      If you are on prescription narcotic pain medication to control your pain you may also take that medication the morning of surgery.    General Instructions:  • Do not eat solid food after midnight the night before surgery.  • You may drink clear liquids day of surgery but must stop at least one hour before your hospital arrival time.  • It is beneficial for you to have a clear drink that contains carbohydrates the day of surgery.  We suggest a 12 to 20 ounce bottle of Gatorade or Powerade for non-diabetic patients or a 12 to 20 ounce bottle of G2 or Powerade Zero for diabetic patients. (Pediatric patients, are not advised to drink a 12 to 20 ounce carbohydrate drink)    Clear liquids are liquids you can see through.  Nothing red in color.     Plain water                               Sports drinks  Sodas                                   Gelatin (Jell-O)  Fruit juices without pulp such as white grape juice and apple juice  Popsicles that contain no fruit or yogurt  Tea or coffee (no cream or milk added)  Gatorade / Powerade  G2 / Powerade Zero    • Infants may have breast milk up to four hours before surgery.  • Infants drinking formula may drink formula up to six hours before surgery.   • Patients who avoid smoking, chewing tobacco and alcohol for 4 weeks prior to surgery have a reduced risk of post-operative complications.  Quit smoking as many days before surgery as you can.  • Do not smoke, use chewing tobacco or drink alcohol the day of surgery.   • If applicable bring your C-PAP/ BI-PAP machine.  • Bring any papers given to you in the doctor’s office.  • Wear clean comfortable clothes.  • Do not wear contact lenses, false eyelashes or make-up.  Bring a case for your glasses.   • Bring crutches or walker if applicable.  • Remove all piercings.  Leave jewelry and  any other valuables at home.  • Hair extensions with metal clips must be removed prior to surgery.  • The Pre-Admission Testing nurse will instruct you to bring medications if unable to obtain an accurate list in Pre-Admission Testing.        If you were given a blood bank ID arm band remember to bring it with you the day of surgery.    Preventing a Surgical Site Infection:  • For 2 to 3 days before surgery, avoid shaving with a razor because the razor can irritate skin and make it easier to develop an infection.    • Any areas of open skin can increase the risk of a post-operative wound infection by allowing bacteria to enter and travel throughout the body.  Notify your surgeon if you have any skin wounds / rashes even if it is not near the expected surgical site.  The area will need assessed to determine if surgery should be delayed until it is healed.  • The night prior to surgery shower using a fresh bar of anti-bacterial soap (such as Dial) and clean washcloth.  Sleep in a clean bed with clean clothing.  Do not allow pets to sleep with you.  • Shower on the morning of surgery using a fresh bar of anti-bacterial soap (such as Dial) and clean washcloth.  Dry with a clean towel and dress in clean clothing.  • Ask your surgeon if you will be receiving antibiotics prior to surgery.  • Make sure you, your family, and all healthcare providers clean their hands with soap and water or an alcohol based hand  before caring for you or your wound.    Day of surgery:  Your arrival time is approximately two hours before your scheduled surgery time.  Upon arrival, a Pre-op nurse and Anesthesiologist will review your health history, obtain vital signs, and answer questions you may have.  The only belongings needed at this time will be a list of your home medications and if applicable your C-PAP/BI-PAP machine.  A Pre-op nurse will start an IV and you may receive medication in preparation for surgery, including  something to help you relax.     Please be aware that surgery does come with discomfort.  We want to make every effort to control your discomfort so please discuss any uncontrolled symptoms with your nurse.   Your doctor will most likely have prescribed pain medications.      If you are going home after surgery you will receive individualized written care instructions before being discharged.  A responsible adult must drive you to and from the hospital on the day of your surgery and stay with you for 24 hours.  Discharge prescriptions can be filled by the hospital pharmacy during regular pharmacy hours.  If you are having surgery late in the day/evening your prescription may be e-prescribed to your pharmacy.  Please verify your pharmacy hours or chose a 24 hour pharmacy to avoid not having access to your prescription because your pharmacy has closed for the day.    If you are staying overnight following surgery, you will be transported to your hospital room following the recovery period.  [facility] has all private rooms.    If you have any questions please call Pre-Admission Testing at [phone].  Deductibles and co-payments are collected on the day of service. Please be prepared to pay the required co-pay, deductible or deposit on the day of service as defined by your plan.    Patient Education for Self-Quarantine Process    Following your COVID testing, we strongly recommend that you do not leave your home after you have been tested for COVID except to get medical care. This includes not going to work, school or to public areas.  If this is not possible for you to do please limit your activities to only required outings.  Be sure to wear a mask when you are with other people, practice social distancing and wash your hands frequently.      The following items provide additional details to keep you safe.  • Wash your hands with soap and water frequently for at least 20 seconds.   • Avoid touching  your eyes, nose and mouth with unwashed hands.  • Do not share anything - utensils, towels, food from the same bowl.   • Have your own utensils, drinking glass, dishes, towels and bedding.   • Do not have visitors.   • Do use FaceTime to stay in touch with family and friends.  • You should stay in a specific room away from others if possible.   • Stay at least 6 feet away from others in the home if you cannot have a dedicated room to yourself.   • Do not snuggle with your pet. While the CDC says there is no evidence that pets can spread COVID-19 or be infected from humans, it is probably best to avoid “petting, snuggling, being kissed or licked and sharing food (during self-quarantine)”, according to the CDC.   • Sanitize household surfaces daily. Include all high touch areas (door handles, light switches, phones, countertops, etc.)  • Do not share a bathroom with others, if possible.   • Wear a mask around others in your home if you are unable to stay in a separate room or 6 feet apart. If  you are unable to wear a mask, have your family member wear a mask if they must be within 6 feet of you.   Call your surgeon immediately if you experience any of the following symptoms:  • Sore Throat  • Shortness of Breath or difficulty breathing  • Cough  • Chills  • Body soreness or muscle pain  • Headache  • Fever  • New loss of taste or smell  • Do not arrive for your surgery ill.  Your procedure will need to be rescheduled to another time.  You will need to call your physician before the day of surgery to avoid any unnecessary exposure to hospital staff as well as other patients.    CHLORHEXIDINE CLOTH INSTRUCTIONS  The morning of surgery follow these instructions using the Chlorhexidine cloths you've been given.  These steps reduce bacteria on the body.  Do not use the cloths near your eyes, ears mouth, genitalia or on open wounds.  Throw the cloths away after use but do not try to flush them down a toilet.      • Open  and remove one cloth at a time from the package.    • Leave the cloth unfolded and begin the bathing.  • Massage the skin with the cloths using gentle pressure to remove bacteria.  Do not scrub harshly.   • Follow the steps below with one 2% CHG cloth per area (6 total cloths).  • One cloth for neck, shoulders and chest.  • One cloth for both arms, hands, fingers and underarms (do underarms last).  • One cloth for the abdomen followed by groin.  • One cloth for right leg and foot including between the toes.  • One cloth for left leg and foot including between the toes.  • The last cloth is to be used for the back of the neck, back and buttocks.    Allow the CHG to air dry 3 minutes on the skin which will give it time to work and decrease the chance of irritation.  The skin may feel sticky until it is dry.  Do not rinse with water or any other liquid or you will lose the beneficial effects of the CHG.  If mild skin irritation occurs, do rinse the skin to remove the CHG.  Report this to the nurse at time of admission.  Do not apply lotions, creams, ointments, deodorants or perfumes after using the clothes. Dress in clean clothes before coming to the hospital.

## 2021-02-20 ENCOUNTER — LAB (OUTPATIENT)
Dept: LAB | Facility: HOSPITAL | Age: 76
End: 2021-02-20

## 2021-02-20 DIAGNOSIS — Z01.818 OTHER SPECIFIED PRE-OPERATIVE EXAMINATION: ICD-10-CM

## 2021-02-20 PROCEDURE — U0004 COV-19 TEST NON-CDC HGH THRU: HCPCS

## 2021-02-20 PROCEDURE — C9803 HOPD COVID-19 SPEC COLLECT: HCPCS

## 2021-02-22 ENCOUNTER — TELEPHONE (OUTPATIENT)
Dept: SURGERY | Facility: CLINIC | Age: 76
End: 2021-02-22

## 2021-02-22 LAB — SARS-COV-2 RNA RESP QL NAA+PROBE: NOT DETECTED

## 2021-02-22 NOTE — TELEPHONE ENCOUNTER
Patients daughter called and would like for you to call her so all of you can discuss his hospital admission and the plan for his surgery tomorrow.  I informed her that the surgery is outpatient and she is aware of that.

## 2021-02-23 ENCOUNTER — ANESTHESIA EVENT (OUTPATIENT)
Dept: PERIOP | Facility: HOSPITAL | Age: 76
End: 2021-02-23

## 2021-02-23 ENCOUNTER — ANESTHESIA (OUTPATIENT)
Dept: PERIOP | Facility: HOSPITAL | Age: 76
End: 2021-02-23

## 2021-02-23 ENCOUNTER — HOSPITAL ENCOUNTER (OUTPATIENT)
Facility: HOSPITAL | Age: 76
Setting detail: HOSPITAL OUTPATIENT SURGERY
Discharge: HOME | End: 2021-02-23
Attending: COLON & RECTAL SURGERY | Admitting: COLON & RECTAL SURGERY

## 2021-02-23 VITALS
WEIGHT: 242.9 LBS | DIASTOLIC BLOOD PRESSURE: 89 MMHG | BODY MASS INDEX: 32.9 KG/M2 | HEIGHT: 72 IN | OXYGEN SATURATION: 93 % | TEMPERATURE: 97.9 F | SYSTOLIC BLOOD PRESSURE: 159 MMHG | HEART RATE: 78 BPM | RESPIRATION RATE: 16 BRPM

## 2021-02-23 DIAGNOSIS — C21.0 ANAL CANCER (HCC): ICD-10-CM

## 2021-02-23 PROCEDURE — 25010000002 ONDANSETRON PER 1 MG: Performed by: NURSE ANESTHETIST, CERTIFIED REGISTERED

## 2021-02-23 PROCEDURE — 88342 IMHCHEM/IMCYTCHM 1ST ANTB: CPT | Performed by: COLON & RECTAL SURGERY

## 2021-02-23 PROCEDURE — 11106 INCAL BX SKN SINGLE LES: CPT | Performed by: COLON & RECTAL SURGERY

## 2021-02-23 PROCEDURE — 25010000002 PROPOFOL 10 MG/ML EMULSION: Performed by: NURSE ANESTHETIST, CERTIFIED REGISTERED

## 2021-02-23 PROCEDURE — 25010000002 DEXAMETHASONE PER 1 MG: Performed by: NURSE ANESTHETIST, CERTIFIED REGISTERED

## 2021-02-23 PROCEDURE — 46924 DESTRUCTION ANAL LESION(S): CPT | Performed by: COLON & RECTAL SURGERY

## 2021-02-23 PROCEDURE — 88305 TISSUE EXAM BY PATHOLOGIST: CPT | Performed by: COLON & RECTAL SURGERY

## 2021-02-23 PROCEDURE — 25010000002 FENTANYL CITRATE (PF) 100 MCG/2ML SOLUTION: Performed by: NURSE ANESTHETIST, CERTIFIED REGISTERED

## 2021-02-23 PROCEDURE — 25010000003 CEFAZOLIN IN DEXTROSE 2-4 GM/100ML-% SOLUTION: Performed by: COLON & RECTAL SURGERY

## 2021-02-23 PROCEDURE — 11107 INCAL BX SKN EA SEP/ADDL: CPT | Performed by: COLON & RECTAL SURGERY

## 2021-02-23 RX ORDER — ONDANSETRON 4 MG/1
4 TABLET, FILM COATED ORAL ONCE AS NEEDED
Status: DISCONTINUED | OUTPATIENT
Start: 2021-02-23 | End: 2021-02-23 | Stop reason: HOSPADM

## 2021-02-23 RX ORDER — FENTANYL CITRATE 50 UG/ML
50 INJECTION, SOLUTION INTRAMUSCULAR; INTRAVENOUS
Status: DISCONTINUED | OUTPATIENT
Start: 2021-02-23 | End: 2021-02-23 | Stop reason: HOSPADM

## 2021-02-23 RX ORDER — KETAMINE HYDROCHLORIDE 10 MG/ML
INJECTION INTRAMUSCULAR; INTRAVENOUS AS NEEDED
Status: DISCONTINUED | OUTPATIENT
Start: 2021-02-23 | End: 2021-02-23 | Stop reason: SURG

## 2021-02-23 RX ORDER — DIPHENHYDRAMINE HCL 25 MG
25 CAPSULE ORAL
Status: DISCONTINUED | OUTPATIENT
Start: 2021-02-23 | End: 2021-02-23 | Stop reason: HOSPADM

## 2021-02-23 RX ORDER — MAGNESIUM HYDROXIDE 1200 MG/15ML
LIQUID ORAL AS NEEDED
Status: DISCONTINUED | OUTPATIENT
Start: 2021-02-23 | End: 2021-02-23 | Stop reason: HOSPADM

## 2021-02-23 RX ORDER — SODIUM CHLORIDE 0.9 % (FLUSH) 0.9 %
10 SYRINGE (ML) INJECTION AS NEEDED
Status: DISCONTINUED | OUTPATIENT
Start: 2021-02-23 | End: 2021-02-23 | Stop reason: HOSPADM

## 2021-02-23 RX ORDER — OXYCODONE AND ACETAMINOPHEN 7.5; 325 MG/1; MG/1
1 TABLET ORAL ONCE AS NEEDED
Status: DISCONTINUED | OUTPATIENT
Start: 2021-02-23 | End: 2021-02-23 | Stop reason: HOSPADM

## 2021-02-23 RX ORDER — LABETALOL HYDROCHLORIDE 5 MG/ML
5 INJECTION, SOLUTION INTRAVENOUS
Status: DISCONTINUED | OUTPATIENT
Start: 2021-02-23 | End: 2021-02-23 | Stop reason: HOSPADM

## 2021-02-23 RX ORDER — SODIUM CHLORIDE 0.9 % (FLUSH) 0.9 %
3-10 SYRINGE (ML) INJECTION AS NEEDED
Status: DISCONTINUED | OUTPATIENT
Start: 2021-02-23 | End: 2021-02-23 | Stop reason: HOSPADM

## 2021-02-23 RX ORDER — EPHEDRINE SULFATE 50 MG/ML
5 INJECTION, SOLUTION INTRAVENOUS ONCE AS NEEDED
Status: DISCONTINUED | OUTPATIENT
Start: 2021-02-23 | End: 2021-02-23 | Stop reason: HOSPADM

## 2021-02-23 RX ORDER — MIDAZOLAM HYDROCHLORIDE 1 MG/ML
0.5 INJECTION INTRAMUSCULAR; INTRAVENOUS
Status: DISCONTINUED | OUTPATIENT
Start: 2021-02-23 | End: 2021-02-23 | Stop reason: HOSPADM

## 2021-02-23 RX ORDER — FAMOTIDINE 10 MG/ML
20 INJECTION, SOLUTION INTRAVENOUS ONCE
Status: COMPLETED | OUTPATIENT
Start: 2021-02-23 | End: 2021-02-23

## 2021-02-23 RX ORDER — FLUMAZENIL 0.1 MG/ML
0.2 INJECTION INTRAVENOUS AS NEEDED
Status: DISCONTINUED | OUTPATIENT
Start: 2021-02-23 | End: 2021-02-23 | Stop reason: HOSPADM

## 2021-02-23 RX ORDER — TRAMADOL HYDROCHLORIDE 50 MG/1
50 TABLET ORAL EVERY 6 HOURS PRN
Status: DISCONTINUED | OUTPATIENT
Start: 2021-02-23 | End: 2021-02-23 | Stop reason: HOSPADM

## 2021-02-23 RX ORDER — ONDANSETRON 2 MG/ML
4 INJECTION INTRAMUSCULAR; INTRAVENOUS ONCE AS NEEDED
Status: DISCONTINUED | OUTPATIENT
Start: 2021-02-23 | End: 2021-02-23 | Stop reason: HOSPADM

## 2021-02-23 RX ORDER — OXYCODONE AND ACETAMINOPHEN 10; 325 MG/1; MG/1
1 TABLET ORAL EVERY 4 HOURS PRN
Status: DISCONTINUED | OUTPATIENT
Start: 2021-02-23 | End: 2021-02-23 | Stop reason: HOSPADM

## 2021-02-23 RX ORDER — MIDAZOLAM HYDROCHLORIDE 1 MG/ML
1 INJECTION INTRAMUSCULAR; INTRAVENOUS
Status: DISCONTINUED | OUTPATIENT
Start: 2021-02-23 | End: 2021-02-23 | Stop reason: HOSPADM

## 2021-02-23 RX ORDER — SODIUM CHLORIDE, SODIUM LACTATE, POTASSIUM CHLORIDE, CALCIUM CHLORIDE 600; 310; 30; 20 MG/100ML; MG/100ML; MG/100ML; MG/100ML
9 INJECTION, SOLUTION INTRAVENOUS CONTINUOUS
Status: DISCONTINUED | OUTPATIENT
Start: 2021-02-23 | End: 2021-02-23 | Stop reason: HOSPADM

## 2021-02-23 RX ORDER — PROPOFOL 10 MG/ML
VIAL (ML) INTRAVENOUS AS NEEDED
Status: DISCONTINUED | OUTPATIENT
Start: 2021-02-23 | End: 2021-02-23 | Stop reason: SURG

## 2021-02-23 RX ORDER — SODIUM CHLORIDE 0.9 % (FLUSH) 0.9 %
3 SYRINGE (ML) INJECTION EVERY 12 HOURS SCHEDULED
Status: DISCONTINUED | OUTPATIENT
Start: 2021-02-23 | End: 2021-02-23 | Stop reason: HOSPADM

## 2021-02-23 RX ORDER — LIDOCAINE HYDROCHLORIDE 20 MG/ML
INJECTION, SOLUTION INFILTRATION; PERINEURAL AS NEEDED
Status: DISCONTINUED | OUTPATIENT
Start: 2021-02-23 | End: 2021-02-23 | Stop reason: SURG

## 2021-02-23 RX ORDER — NALOXONE HCL 0.4 MG/ML
0.2 VIAL (ML) INJECTION AS NEEDED
Status: DISCONTINUED | OUTPATIENT
Start: 2021-02-23 | End: 2021-02-23 | Stop reason: HOSPADM

## 2021-02-23 RX ORDER — CEFAZOLIN SODIUM 2 G/100ML
2 INJECTION, SOLUTION INTRAVENOUS ONCE
Status: COMPLETED | OUTPATIENT
Start: 2021-02-23 | End: 2021-02-23

## 2021-02-23 RX ORDER — ONDANSETRON 2 MG/ML
INJECTION INTRAMUSCULAR; INTRAVENOUS AS NEEDED
Status: DISCONTINUED | OUTPATIENT
Start: 2021-02-23 | End: 2021-02-23 | Stop reason: SURG

## 2021-02-23 RX ORDER — LIDOCAINE HYDROCHLORIDE 10 MG/ML
0.5 INJECTION, SOLUTION EPIDURAL; INFILTRATION; INTRACAUDAL; PERINEURAL ONCE AS NEEDED
Status: DISCONTINUED | OUTPATIENT
Start: 2021-02-23 | End: 2021-02-23 | Stop reason: HOSPADM

## 2021-02-23 RX ORDER — HYDRALAZINE HYDROCHLORIDE 20 MG/ML
5 INJECTION INTRAMUSCULAR; INTRAVENOUS
Status: DISCONTINUED | OUTPATIENT
Start: 2021-02-23 | End: 2021-02-23 | Stop reason: HOSPADM

## 2021-02-23 RX ORDER — DIPHENHYDRAMINE HYDROCHLORIDE 50 MG/ML
12.5 INJECTION INTRAMUSCULAR; INTRAVENOUS
Status: DISCONTINUED | OUTPATIENT
Start: 2021-02-23 | End: 2021-02-23 | Stop reason: HOSPADM

## 2021-02-23 RX ORDER — HYDROMORPHONE HYDROCHLORIDE 1 MG/ML
0.5 INJECTION, SOLUTION INTRAMUSCULAR; INTRAVENOUS; SUBCUTANEOUS
Status: DISCONTINUED | OUTPATIENT
Start: 2021-02-23 | End: 2021-02-23 | Stop reason: HOSPADM

## 2021-02-23 RX ORDER — DEXAMETHASONE SODIUM PHOSPHATE 10 MG/ML
INJECTION INTRAMUSCULAR; INTRAVENOUS AS NEEDED
Status: DISCONTINUED | OUTPATIENT
Start: 2021-02-23 | End: 2021-02-23 | Stop reason: SURG

## 2021-02-23 RX ORDER — FENTANYL CITRATE 50 UG/ML
INJECTION, SOLUTION INTRAMUSCULAR; INTRAVENOUS AS NEEDED
Status: DISCONTINUED | OUTPATIENT
Start: 2021-02-23 | End: 2021-02-23 | Stop reason: SURG

## 2021-02-23 RX ORDER — PROMETHAZINE HYDROCHLORIDE 25 MG/1
25 TABLET ORAL ONCE AS NEEDED
Status: DISCONTINUED | OUTPATIENT
Start: 2021-02-23 | End: 2021-02-23 | Stop reason: HOSPADM

## 2021-02-23 RX ORDER — ROCURONIUM BROMIDE 10 MG/ML
INJECTION, SOLUTION INTRAVENOUS AS NEEDED
Status: DISCONTINUED | OUTPATIENT
Start: 2021-02-23 | End: 2021-02-23 | Stop reason: SURG

## 2021-02-23 RX ORDER — PROMETHAZINE HYDROCHLORIDE 25 MG/1
25 SUPPOSITORY RECTAL ONCE AS NEEDED
Status: DISCONTINUED | OUTPATIENT
Start: 2021-02-23 | End: 2021-02-23 | Stop reason: HOSPADM

## 2021-02-23 RX ADMIN — SUGAMMADEX 200 MG: 100 INJECTION, SOLUTION INTRAVENOUS at 08:17

## 2021-02-23 RX ADMIN — SODIUM CHLORIDE, POTASSIUM CHLORIDE, SODIUM LACTATE AND CALCIUM CHLORIDE 9 ML/HR: 600; 310; 30; 20 INJECTION, SOLUTION INTRAVENOUS at 06:44

## 2021-02-23 RX ADMIN — DEXAMETHASONE SODIUM PHOSPHATE 8 MG: 10 INJECTION INTRAMUSCULAR; INTRAVENOUS at 07:34

## 2021-02-23 RX ADMIN — KETAMINE HYDROCHLORIDE 25 MG: 10 INJECTION INTRAMUSCULAR; INTRAVENOUS at 07:34

## 2021-02-23 RX ADMIN — ROCURONIUM BROMIDE 40 MG: 10 INJECTION INTRAVENOUS at 07:34

## 2021-02-23 RX ADMIN — PROPOFOL 150 MG: 10 INJECTION, EMULSION INTRAVENOUS at 07:34

## 2021-02-23 RX ADMIN — CEFAZOLIN SODIUM 2 G: 2 INJECTION, SOLUTION INTRAVENOUS at 07:34

## 2021-02-23 RX ADMIN — FAMOTIDINE 20 MG: 10 INJECTION INTRAVENOUS at 06:44

## 2021-02-23 RX ADMIN — FENTANYL CITRATE 100 MCG: 50 INJECTION INTRAMUSCULAR; INTRAVENOUS at 07:34

## 2021-02-23 RX ADMIN — LIDOCAINE HYDROCHLORIDE 75 MG: 20 INJECTION, SOLUTION INFILTRATION; PERINEURAL at 07:34

## 2021-02-23 RX ADMIN — ONDANSETRON HYDROCHLORIDE 4 MG: 2 SOLUTION INTRAMUSCULAR; INTRAVENOUS at 08:18

## 2021-02-23 NOTE — ANESTHESIA PREPROCEDURE EVALUATION
Anesthesia Evaluation     Patient summary reviewed and Nursing notes reviewed                Airway   Mallampati: III  Possible difficult intubation  Dental      Pulmonary    (+) shortness of breath,   Cardiovascular     ECG reviewed  Rate: normal    (+) hypertension, hyperlipidemia,  carotid artery disease      Neuro/Psych  (+) psychiatric history,     GI/Hepatic/Renal/Endo    (+) obesity,  GI bleeding ,     Musculoskeletal     Abdominal    Substance History - negative use     OB/GYN negative ob/gyn ROS         Other   arthritis,    history of cancer                    Anesthesia Plan    ASA 4     general   (CMAC intubation)  intravenous induction     Anesthetic plan, all risks, benefits, and alternatives have been provided, discussed and informed consent has been obtained with: patient.

## 2021-02-23 NOTE — ANESTHESIA POSTPROCEDURE EVALUATION
"Patient: Darwin Sanchez    Procedure Summary     Date: 02/23/21 Room / Location: [facility] OR [address] [place] [address] MAIN OR    Anesthesia Start: 0727 Anesthesia Stop: 0833    Procedure: PERINEAL/ANAL LESION EXCISION/FULGURATION (N/A ) Diagnosis:       Anal cancer (CMS/HCC)      (Anal cancer (CMS/HCC) [C21.0])    Surgeon: Makeda Ocasio MD Provider: Shaina Waddell MD    Anesthesia Type: general ASA Status: 4          Anesthesia Type: general    Vitals  Vitals Value Taken Time   /96 02/23/21 0900   Temp 36.6 °C (97.9 °F) 02/23/21 0830   Pulse 89 02/23/21 0906   Resp 16 02/23/21 0900   SpO2 93 % 02/23/21 0910   Vitals shown include unvalidated device data.        Post Anesthesia Care and Evaluation    Patient location during evaluation: bedside  Patient participation: complete - patient participated  Level of consciousness: awake and alert  Pain score: 0  Pain management: adequate  Airway patency: patent  Anesthetic complications: No anesthetic complications    Cardiovascular status: acceptable  Respiratory status: acceptable  Hydration status: acceptable    Comments: /89   Pulse 78   Temp 36.6 °C (97.9 °F) (Oral)   Resp 16   Ht 182.9 cm (72\")   Wt 110 kg (242 lb 14.4 oz)   SpO2 93%   BMI 32.94 kg/m²       "

## 2021-02-23 NOTE — ANESTHESIA PROCEDURE NOTES
Airway  Urgency: elective    Date/Time: 2/23/2021 7:43 AM  Airway not difficult    General Information and Staff    Patient location during procedure: OR  Anesthesiologist: Hung Saab MD  CRNA: Nohemy Palumbo CRNA    Indications and Patient Condition  Indications for airway management: airway protection    Preoxygenated: yes  MILS maintained throughout  Mask difficulty assessment: 1 - vent by mask    Final Airway Details  Final airway type: endotracheal airway      Successful airway: ETT  Cuffed: yes   Successful intubation technique: direct laryngoscopy  Facilitating devices/methods: intubating stylet  Endotracheal tube insertion site: oral  Blade: CMAC  Blade size: D  ETT size (mm): 8.0  Cormack-Lehane Classification: grade IIa - partial view of glottis  Placement verified by: chest auscultation and capnometry   Measured from: lips  Number of attempts at approach: 1  Assessment: lips, teeth, and gum same as pre-op and atraumatic intubation    Additional Comments  Atraumatic, Secured after verification of placement

## 2021-02-26 ENCOUNTER — TELEPHONE (OUTPATIENT)
Dept: SURGERY | Facility: CLINIC | Age: 76
End: 2021-02-26

## 2021-02-26 LAB
LAB AP CASE REPORT: NORMAL
LAB AP DIAGNOSIS COMMENT: NORMAL
LAB AP INTRADEPARTMENTAL CONSULT: NORMAL
LAB AP SPECIAL STAINS: NORMAL
PATH REPORT.ADDENDUM SPEC: NORMAL
PATH REPORT.FINAL DX SPEC: NORMAL
PATH REPORT.GROSS SPEC: NORMAL

## 2021-03-01 ENCOUNTER — TELEPHONE (OUTPATIENT)
Dept: SURGERY | Facility: CLINIC | Age: 76
End: 2021-03-01

## 2021-03-01 DIAGNOSIS — C21.0 ANAL CANCER (HCC): Primary | ICD-10-CM

## 2021-03-01 NOTE — TELEPHONE ENCOUNTER
D/w pt results of recurrent anal sq ca.  Will refer to wocn. Pt not wanting surgery at this time.  D/w him that he needs time to digest dx and will talk again at post op appt next week.

## 2021-03-01 NOTE — TELEPHONE ENCOUNTER
Pt sent message via [place].    ----- Message from Darwin Sanchez sent at 2/26/2021  5:10 PM EST -----  Regarding: Test Results Question  Contact: [phone]  Checking on results to see if cancer has returned   Thought I would get results today. Sanjeev

## 2021-03-08 ENCOUNTER — OFFICE VISIT (OUTPATIENT)
Dept: SURGERY | Facility: CLINIC | Age: 76
End: 2021-03-08

## 2021-03-08 VITALS
WEIGHT: 246.8 LBS | HEIGHT: 72 IN | HEART RATE: 76 BPM | OXYGEN SATURATION: 98 % | SYSTOLIC BLOOD PRESSURE: 140 MMHG | DIASTOLIC BLOOD PRESSURE: 82 MMHG | BODY MASS INDEX: 33.43 KG/M2 | TEMPERATURE: 97.5 F

## 2021-03-08 DIAGNOSIS — C21.0 ANAL CANCER (HCC): Primary | ICD-10-CM

## 2021-03-08 PROCEDURE — 99215 OFFICE O/P EST HI 40 MIN: CPT | Performed by: COLON & RECTAL SURGERY

## 2021-03-08 RX ORDER — CEFAZOLIN SODIUM 2 G/100ML
2 INJECTION, SOLUTION INTRAVENOUS ONCE
Status: CANCELLED | OUTPATIENT
Start: 2021-04-06 | End: 2021-03-08

## 2021-03-08 RX ORDER — GABAPENTIN 300 MG/1
600 CAPSULE ORAL 3 TIMES DAILY
Status: CANCELLED | OUTPATIENT
Start: 2021-04-06

## 2021-03-08 RX ORDER — CHLORHEXIDINE GLUCONATE 4 G/100ML
SOLUTION TOPICAL 2 TIMES DAILY
Qty: 236 ML | Refills: 0 | Status: SHIPPED | OUTPATIENT
Start: 2021-03-08 | End: 2021-04-02

## 2021-03-08 RX ORDER — CELECOXIB 200 MG/1
200 CAPSULE ORAL EVERY 12 HOURS SCHEDULED
Status: CANCELLED | OUTPATIENT
Start: 2021-04-06

## 2021-03-08 RX ORDER — ACETAMINOPHEN 500 MG
1000 TABLET ORAL EVERY 6 HOURS
Status: CANCELLED | OUTPATIENT
Start: 2021-04-06

## 2021-03-08 RX ORDER — ALVIMOPAN 12 MG/1
12 CAPSULE ORAL ONCE
Status: CANCELLED | OUTPATIENT
Start: 2021-04-06 | End: 2021-03-08

## 2021-03-08 NOTE — PROGRESS NOTES
"Darwin Sanchez is a 75 y.o. male in for follow up of Anal cancer (CMS/HCC)   No bleeding  Still has drainage  Son and daughter here to discuss plan    Past Medical History:   Diagnosis Date   • Anxiety    • Arthritis    • Carotid artery occlusion 07/06/2019   • Chest pain 02/2017   • Coronavirus infection 03/2020   • Dyspnea 04/2017   • ED (erectile dysfunction)    • Fish hook injury of finger 09/04/2017    RIGHT INDEX FINGER, SEEN AT [facility] ER   • Gout    • Gouty arthropathy 7/6/2019   • History of chemotherapy    • History of radiation therapy    • Hyperlipidemia 7/6/2019   • Hypertension    • Impaired fasting glucose 7/6/2019   • OA (osteoarthritis)     MULTIPLE SITES   • Osteoporosis    • 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 CACERES   • RECTAL EXAMINATION UNDER ANESTHESIA N/A 03/09/2018    WITH BIOPSIES OF ANUS, DR. LINA ISIDRO AT [facility]   • SQUAMOUS CELL CARCINOMA EXCISION N/A 09/23/2016    DIETER RECTAL AREA, DR. LINA ISIDRO AT [facility]   • TOTAL KNEE ARTHROPLASTY Right 10/17/2018    Procedure: TOTAL KNEE ARTHROPLASTY;  Surgeon: Cooper Ruby MD;  Location: [facility];  Service: Orthopedics         /82 (BP Location: Left arm, Patient Position: Sitting, Cuff Size: Large Adult)   Pulse 76   Temp 97.5 °F (36.4 °C)   Ht 182.9 cm (72\")   Wt 112 kg (246 lb 12.8 oz)   SpO2 98%   BMI 33.47 kg/m²   Body mass index is 33.47 " kg/m².      PE:  Physical Exam  Constitutional:       General: He is not in acute distress.     Appearance: He is well-developed.   HENT:      Head: Normocephalic and atraumatic.   Abdominal:      General: There is no distension.      Palpations: Abdomen is soft.   Musculoskeletal:         General: Normal range of motion.   Neurological:      Mental Status: He is alert.   Psychiatric:         Thought Content: Thought content normal.           Assessment:   1. Anal cancer (CMS/[facility])       Plan:  I recommend robotic APR with plastic surgery to do advanced skin closure possible muscular flap.  We will set up with plastic surgery for patient to be evaluated.  Discussed at length surgery, surgical margins, and enhance recovery system. I described with patient typical surgical time, postop recovery including pain management, and restrictions. I discussed with patient risks, benefits, and alternatives.  The patient had opportunity to ask questions.  I answered all questions.  Patient understands and wishes to proceed with procedure.    Time with patient 55 minutes.  95% counseling

## 2021-03-15 ENCOUNTER — TELEPHONE (OUTPATIENT)
Dept: CARDIOLOGY | Facility: CLINIC | Age: 76
End: 2021-03-15

## 2021-03-15 ENCOUNTER — OFFICE VISIT (OUTPATIENT)
Dept: CARDIOLOGY | Facility: CLINIC | Age: 76
End: 2021-03-15

## 2021-03-15 VITALS
HEART RATE: 73 BPM | HEIGHT: 72 IN | DIASTOLIC BLOOD PRESSURE: 70 MMHG | WEIGHT: 244.2 LBS | BODY MASS INDEX: 33.08 KG/M2 | SYSTOLIC BLOOD PRESSURE: 120 MMHG

## 2021-03-15 DIAGNOSIS — I71.20 THORACIC AORTIC ANEURYSM WITHOUT RUPTURE (HCC): Primary | ICD-10-CM

## 2021-03-15 DIAGNOSIS — I25.810 CORONARY ARTERY DISEASE INVOLVING CORONARY BYPASS GRAFT OF NATIVE HEART WITHOUT ANGINA PECTORIS: ICD-10-CM

## 2021-03-15 DIAGNOSIS — Z01.810 PREOP CARDIOVASCULAR EXAM: ICD-10-CM

## 2021-03-15 DIAGNOSIS — I10 BENIGN ESSENTIAL HYPERTENSION: ICD-10-CM

## 2021-03-15 PROCEDURE — 99204 OFFICE O/P NEW MOD 45 MIN: CPT | Performed by: INTERNAL MEDICINE

## 2021-03-15 PROCEDURE — 93000 ELECTROCARDIOGRAM COMPLETE: CPT | Performed by: INTERNAL MEDICINE

## 2021-03-15 NOTE — PROGRESS NOTES
Date of Office Visit: 03/15/2021  Encounter Provider: Livier Bui MD  Place of Service: [facility] CARDIOLOGY  Patient Name: Darwin Sanchez  :1945    Chief complaint  Consult requested by Dr. Ocasio for preoperative clearance prior to anal cancer resection with coronary artery disease, thoracic aortic aneurysm    History of Present Illness  Patient is a 75-year-old gentleman with history of hypertension, hyperlipidemia, impaired fasting glucose, questionable carotid artery disease (vascular screening study), psoriasis, rheumatoid arthritis who has a history of squamous cell anal cancer with excision 2013 subsequently had radiation and chemotherapy.  He has poor wound healing and has a chronic ulcer that is been followed him in many years.  However in 2021 he was found to have rectal bleeding from a nonhealing perianal wound subsequent biopsy shows recurrent carcinoma.  CT scan of the chest abdomen showed no adenopathy, ascending aorta measuring 4.4 cm, mild to moderate coronary artery calcification, small pulmonary nodule right lower lobe, mildly enlarged common iliac lymph nodes.    He is active walking 3-4 times a week 2.5 miles in 40 minutes.  He denies any chest pain, shortness of breath, palpitations syncope near syncope.  Blood pressure at times is as it is today though in between hydralazine pills it escalates to 140s over 90s.  He believes his lipids are under control.  He is not sure  about the diagnosis of carotid artery disease.  He believes he may have had a distant vascular screening study that suggested this.  He also has a history of elevated glucose several years ago.  This seems to have improved    Past Medical History:   Diagnosis Date   • Anal cancer (CMS/HCC)    • Anxiety    • Arthritis    • Carotid artery occlusion 2019   • Chest pain 2017   • Coronavirus infection 2020   • Dyspnea 2017   • ED (erectile dysfunction)    • Fish hook  injury of finger 09/04/2017    RIGHT INDEX FINGER, SEEN AT [facility]   • Gout    • Gouty arthropathy 7/6/2019   • History of chemotherapy    • History of radiation therapy    • Hyperlipidemia 7/6/2019   • Hypertension    • Impaired fasting glucose 7/6/2019   • OA (osteoarthritis)     MULTIPLE SITES   • Osteoporosis    • 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   • Radiation dermatitis    • Rectal bleeding     WOUND, TO HAVE BIOPSIED PER DR OCASIO   • Rheumatoid arthritis (CMS/HCC)    • SCC (squamous cell carcinoma)     ANAL, S/P EXCISION AROUND 2013, HAD RADIATION AND CHEMO   • Uses hearing aid     BILATERAL     Past Surgical History:   Procedure Laterality Date   • COLONOSCOPY  2018   • CONDYLOMA REMOVAL N/A 2/23/2021    Procedure: PERINEAL/ANAL LESION EXCISION/FULGURATION;  Surgeon: Makeda Ocasio MD;  Location: [facility];  Service: General;  Laterality: N/A;   • KNEE ARTHROSCOPY Bilateral     OVER 35 YRS AGO, DR. CACERES   • RECTAL EXAMINATION UNDER ANESTHESIA N/A 03/09/2018    WITH BIOPSIES OF ANUS, DR. LINA ISIDRO AT [facility]   • SQUAMOUS CELL CARCINOMA EXCISION N/A 09/23/2016    DIETER RECTAL AREA, DR. LINA ISIDRO AT [facility]   • TOTAL KNEE ARTHROPLASTY Right 10/17/2018    Procedure: TOTAL KNEE ARTHROPLASTY;  Surgeon: Cooper Ruby MD;  Location: [facility];  Service: Orthopedics     Outpatient Medications Prior to Visit   Medication Sig Dispense Refill   • allopurinol (ZYLOPRIM) 100 MG tablet Take 100 mg by mouth Daily As Needed.     • atorvastatin (LIPITOR) 40 MG tablet Take 40 mg by mouth Daily.     • chlorhexidine (HIBICLENS) 4 % external liquid Apply  topically to the appropriate area as directed 2 (Two) Times a Day. Shower With Hibiclens Solution Twice The Day Before Surgery 236 mL 0   • clobetasol (TEMOVATE) 0.05 % cream Apply 1 application topically to the appropriate area as directed 2 (Two) Times a Day As Needed.     • colchicine 0.6 MG tablet Take 0.6 mg by mouth Daily As  Needed for Muscle / Joint Pain.     • hydrALAZINE (APRESOLINE) 100 MG tablet Take 100 mg by mouth 2 (Two) Times a Day.     • hydroCHLOROthiazide (HYDRODIURIL) 12.5 MG tablet Take 12.5 mg by mouth Daily.     • indomethacin SR (INDOCIN SR) 75 MG CR capsule Take 75 mg by mouth Daily With Breakfast. HOLD IF INSTRUCTED PER MD     • losartan (COZAAR) 100 MG tablet Take 100 mg by mouth Daily.     • zolpidem (AMBIEN) 5 MG tablet Take 5 mg by mouth At Night As Needed.     • traMADol (Ultram) 50 MG tablet Take 1 tablet by mouth Every 6 (Six) Hours As Needed for Moderate Pain . (Patient taking differently: Take 50 mg by mouth Every 6 (Six) Hours As Needed for Moderate Pain . Pt has not taken medication as of today.) 20 tablet 0     Facility-Administered Medications Prior to Visit   Medication Dose Route Frequency Provider Last Rate Last Admin   • mupirocin (BACTROBAN) 2 % nasal ointment   Nasal BID Cooper Ruby MD           Allergies as of 03/15/2021 - Reviewed 03/15/2021   Allergen Reaction Noted   • Hydrocodone-acetaminophen Itching, Mental Status Change, and Hallucinations 10/30/2018     Social History     Socioeconomic History   • Marital status:      Spouse name: Not on file   • Number of children: Not on file   • Years of education: Not on file   • Highest education level: Not on file   Tobacco Use   • Smoking status: Never Smoker   • Smokeless tobacco: Never Used   Vaping Use   • Vaping Use: Never used   Substance and Sexual Activity   • Alcohol use: No     Comment: Daily caffeine use   • Drug use: No   • Sexual activity: Yes     Comment: .     Family History   Problem Relation Age of Onset   • Heart disease Mother    • Hypertension Mother    • Heart disease Father    • Hypertension Father    • Cancer Father         prostate.   • Aneurysm Brother    • Hypertension Brother    • Cancer Brother    • Heart disease Brother    • Hypertension Brother    • Malig Hyperthermia Neg Hx      Review of Systems  "  Constitutional: Negative for chills, fever, weight gain and weight loss.   Cardiovascular: Negative for leg swelling.   Respiratory: Negative for cough, snoring and wheezing.    Hematologic/Lymphatic: Negative for bleeding problem. Does not bruise/bleed easily.   Skin: Negative for color change.   Musculoskeletal: Negative for falls, joint pain and myalgias.   Gastrointestinal: Negative for melena.   Genitourinary: Negative for hematuria.   Neurological: Negative for excessive daytime sleepiness.   Psychiatric/Behavioral: Negative for depression. The patient is not nervous/anxious.         Objective:     Vitals:    03/15/21 0955 03/15/21 0956   BP: 120/70 120/70   BP Location: Right arm Left arm   Pulse: 73    Weight: 111 kg (244 lb 3.2 oz)    Height: 182.9 cm (72\")      Body mass index is 33.12 kg/m².    Vitals reviewed.   Constitutional:       Appearance: Well-developed.      Comments: Obese   Eyes:      General: No scleral icterus.        Right eye: No discharge.      Conjunctiva/sclera: Conjunctivae normal.      Pupils: Pupils are equal, round, and reactive to light.   HENT:      Head: Normocephalic.      Nose: Nose normal.   Neck:      Thyroid: No thyromegaly.      Vascular: No JVD.   Pulmonary:      Effort: Pulmonary effort is normal. No respiratory distress.      Breath sounds: Normal breath sounds. No wheezing. No rales.   Cardiovascular:      Normal rate. Regular rhythm. Normal S1. Normal S2.      Murmurs: There is a grade 1/6 systolic murmur at the URSB and ULSB.      No gallop.   Pulses:     Intact distal pulses.   Edema:     Peripheral edema absent.   Abdominal:      General: Bowel sounds are normal. There is no distension.      Palpations: Abdomen is soft.      Tenderness: There is no abdominal tenderness. There is no rebound.   Musculoskeletal: Normal range of motion.         General: No tenderness.      Cervical back: Normal range of motion and neck supple. Skin:     General: Skin is warm and dry. "      Findings: No erythema or rash.   Neurological:      Mental Status: Alert and oriented to person, place, and time.   Psychiatric:         Behavior: Behavior normal.         Thought Content: Thought content normal.         Judgment: Judgment normal.       Lab Review:     ECG 12 Lead    Date/Time: 3/15/2021 10:01 AM  Performed by: Livier Bui MD  Authorized by: Livier Bui MD   Comparison: compared with previous ECG   Similar to previous ECG  Rhythm: sinus rhythm    Clinical impression: normal ECG          Assessment:       Diagnosis Plan   1. Thoracic aortic aneurysm without rupture (CMS/HCC)  Adult Transthoracic Echo Complete W/ Cont if Necessary Per Protocol   2. Coronary artery disease involving coronary bypass graft of native heart without angina pectoris  ECG 12 Lead    Stress Test With Myocardial Perfusion One Day   3. Preop cardiovascular exam  ECG 12 Lead   4. Benign essential hypertension       Plan:       1.  Preoperative clearance he has coronary artery disease and aortic aneurysmal disease noted incidentally on CT imaging.  There is no ischemia at a moderate workload on the treadmill.  This point I recommended he proceed with a Cardiolite stress test.  I recommended a pharmacologic Lexiscan imaging study however patient really does not wish to pursue this but would prefer to do an exercise cardiac stress test.  I reviewed with him that with aortic aneurysms greater than 4.5 cm we typically will do a Cristina Cardiolite stress test however he wishes to pursue exercise regimen instead.  In addition we will check an echocardiogram.  2.  Coronary artery disease.  As above.  Needs risk factor modification.  Avoiding aspirin therapy in the setting of rectal bleeding and ulceration.  If this clears up well enough that he can tolerate aspirin down the road would be reasonable to add to his regimen  3.  Ascending aortic aneurysm.  I reviewed the finding in detail with patient and son who is at the visit.  Of  note is patient's twin sister and another brother are being treated for aortic aneurysmal disease.  I strongly recommended he avoid weightlifting greater than 30 pounds.  He will need another CT scan in 6 months to follow-up on the aneurysm not done earlier by Dr. Ocasio to follow-up on the pulmonary nodule.  In addition I recommended all family members be tested.  Also recommend the patient start Toprol-XL 25 mg a day and to then increase to 50 mg daily as tolerated and will institute this after his stress test to be done in the next few days  4.  Borderline dilated iliac arteries, followed by CT imaging  5.  History of squamous cell anal carcinoma, status post resection with distant chemoradiation therapy with chronic residual poor skin wound healing.  6.  Dyslipidemia, apparently controlled per patient  7.  Hypertension.  Normal today though states intermittently has spikes with the use of hydralazine which is only taking twice a day.  Once we start Toprol as above, will decrease/change hydralazine to 50 mg 3 times daily  8.  Pulmonary nodule.  Have asked him to follow-up with Dr. Ocasio regarding this.  9.  Questionable history of carotid artery disease.  He thinks he may have had a vascular screening study many years ago but suggested this.  He is asymptomatic.  Consider follow-up imaging after a nail cancer has been addressed      I spent a total  50 minutes spent including reviewing records with 35minutes of face to face time with this patient.       Your medication list          Accurate as of March 15, 2021 11:59 PM. If you have any questions, ask your nurse or doctor.            CONTINUE taking these medications      Instructions Last Dose Given Next Dose Due   allopurinol 100 MG tablet  Commonly known as: ZYLOPRIM      Take 100 mg by mouth Daily As Needed.       atorvastatin 40 MG tablet  Commonly known as: LIPITOR      Take 40 mg by mouth Daily.       chlorhexidine 4 % external liquid  Commonly known as:  HIBICLENS      Apply  topically to the appropriate area as directed 2 (Two) Times a Day. Shower With Hibiclens Solution Twice The Day Before Surgery       clobetasol 0.05 % cream  Commonly known as: TEMOVATE      Apply 1 application topically to the appropriate area as directed 2 (Two) Times a Day As Needed.       colchicine 0.6 MG tablet      Take 0.6 mg by mouth Daily As Needed for Muscle / Joint Pain.       hydrALAZINE 100 MG tablet  Commonly known as: APRESOLINE      Take 100 mg by mouth 2 (Two) Times a Day.       hydroCHLOROthiazide 12.5 MG tablet  Commonly known as: HYDRODIURIL      Take 12.5 mg by mouth Daily.       indomethacin SR 75 MG CR capsule  Commonly known as: INDOCIN SR      Take 75 mg by mouth Daily With Breakfast. HOLD IF INSTRUCTED PER MD       losartan 100 MG tablet  Commonly known as: COZAAR      Take 100 mg by mouth Daily.       zolpidem 5 MG tablet  Commonly known as: AMBIEN      Take 5 mg by mouth At Night As Needed.          STOP taking these medications    traMADol 50 MG tablet  Commonly known as: Ultram  Stopped by: Livier Bui MD               Patient is no longer taking tramadol.  I corrected the med list to reflect this.  I did not stop these medications.    Dictated utilizing Dragon dictation

## 2021-03-16 ENCOUNTER — TRANSCRIBE ORDERS (OUTPATIENT)
Dept: SLEEP MEDICINE | Facility: HOSPITAL | Age: 76
End: 2021-03-16

## 2021-03-16 DIAGNOSIS — Z01.818 OTHER SPECIFIED PRE-OPERATIVE EXAMINATION: Primary | ICD-10-CM

## 2021-03-17 ENCOUNTER — LAB (OUTPATIENT)
Dept: LAB | Facility: HOSPITAL | Age: 76
End: 2021-03-17

## 2021-03-17 DIAGNOSIS — Z01.818 OTHER SPECIFIED PRE-OPERATIVE EXAMINATION: ICD-10-CM

## 2021-03-17 PROCEDURE — C9803 HOPD COVID-19 SPEC COLLECT: HCPCS

## 2021-03-17 PROCEDURE — U0004 COV-19 TEST NON-CDC HGH THRU: HCPCS

## 2021-03-18 LAB — SARS-COV-2 RNA RESP QL NAA+PROBE: NOT DETECTED

## 2021-03-19 ENCOUNTER — HOSPITAL ENCOUNTER (OUTPATIENT)
Dept: CARDIOLOGY | Facility: HOSPITAL | Age: 76
Discharge: HOME | End: 2021-03-19

## 2021-03-19 VITALS — BODY MASS INDEX: 33.15 KG/M2 | WEIGHT: 244.71 LBS | HEIGHT: 72 IN

## 2021-03-19 DIAGNOSIS — I71.20 THORACIC AORTIC ANEURYSM WITHOUT RUPTURE (HCC): ICD-10-CM

## 2021-03-19 DIAGNOSIS — I25.810 CORONARY ARTERY DISEASE INVOLVING CORONARY BYPASS GRAFT OF NATIVE HEART WITHOUT ANGINA PECTORIS: ICD-10-CM

## 2021-03-19 LAB
ASCENDING AORTA: 4.2 CM
BH CV ECHO MEAS - ACS: 2.3 CM
BH CV ECHO MEAS - AI DEC SLOPE: 73.3 CM/SEC^2
BH CV ECHO MEAS - AI MAX PG: 51.9 MMHG
BH CV ECHO MEAS - AI MAX VEL: 360.3 CM/SEC
BH CV ECHO MEAS - AI P1/2T: 1440 MSEC
BH CV ECHO MEAS - AO MAX PG (FULL): 11 MMHG
BH CV ECHO MEAS - AO MAX PG: 14.8 MMHG
BH CV ECHO MEAS - AO MEAN PG (FULL): 5.8 MMHG
BH CV ECHO MEAS - AO MEAN PG: 7.6 MMHG
BH CV ECHO MEAS - AO ROOT AREA (BSA CORRECTED): 1.5
BH CV ECHO MEAS - AO ROOT AREA: 9.6 CM^2
BH CV ECHO MEAS - AO ROOT DIAM: 3.5 CM
BH CV ECHO MEAS - AO V2 MAX: 192.5 CM/SEC
BH CV ECHO MEAS - AO V2 MEAN: 128 CM/SEC
BH CV ECHO MEAS - AO V2 VTI: 42.8 CM
BH CV ECHO MEAS - ASC AORTA: 4.1 CM
BH CV ECHO MEAS - AVA(I,A): 1.6 CM^2
BH CV ECHO MEAS - AVA(I,D): 1.6 CM^2
BH CV ECHO MEAS - AVA(V,A): 1.6 CM^2
BH CV ECHO MEAS - AVA(V,D): 1.6 CM^2
BH CV ECHO MEAS - BSA(HAYCOCK): 2.4 M^2
BH CV ECHO MEAS - BSA: 2.3 M^2
BH CV ECHO MEAS - BZI_BMI: 33.1 KILOGRAMS/M^2
BH CV ECHO MEAS - BZI_METRIC_HEIGHT: 182.9 CM
BH CV ECHO MEAS - BZI_METRIC_WEIGHT: 110.7 KG
BH CV ECHO MEAS - EDV(MOD-SP2): 86 ML
BH CV ECHO MEAS - EDV(MOD-SP4): 113 ML
BH CV ECHO MEAS - EDV(TEICH): 101.9 ML
BH CV ECHO MEAS - EF(CUBED): 78.8 %
BH CV ECHO MEAS - EF(MOD-BP): 61.9 %
BH CV ECHO MEAS - EF(MOD-SP2): 58.1 %
BH CV ECHO MEAS - EF(MOD-SP4): 65.5 %
BH CV ECHO MEAS - EF(TEICH): 71.1 %
BH CV ECHO MEAS - ESV(MOD-SP2): 36 ML
BH CV ECHO MEAS - ESV(MOD-SP4): 39 ML
BH CV ECHO MEAS - ESV(TEICH): 29.5 ML
BH CV ECHO MEAS - FS: 40.4 %
BH CV ECHO MEAS - IVS/LVPW: 1.2
BH CV ECHO MEAS - IVSD: 1 CM
BH CV ECHO MEAS - LAT PEAK E' VEL: 8.5 CM/SEC
BH CV ECHO MEAS - LV DIASTOLIC VOL/BSA (35-75): 48.7 ML/M^2
BH CV ECHO MEAS - LV MASS(C)D: 154.4 GRAMS
BH CV ECHO MEAS - LV MASS(C)DI: 66.6 GRAMS/M^2
BH CV ECHO MEAS - LV MAX PG: 3.8 MMHG
BH CV ECHO MEAS - LV MEAN PG: 1.8 MMHG
BH CV ECHO MEAS - LV SYSTOLIC VOL/BSA (12-30): 16.8 ML/M^2
BH CV ECHO MEAS - LV V1 MAX: 97.9 CM/SEC
BH CV ECHO MEAS - LV V1 MEAN: 61.4 CM/SEC
BH CV ECHO MEAS - LV V1 VTI: 22.3 CM
BH CV ECHO MEAS - LVIDD: 4.7 CM
BH CV ECHO MEAS - LVIDS: 2.8 CM
BH CV ECHO MEAS - LVLD AP2: 7.8 CM
BH CV ECHO MEAS - LVLD AP4: 8.3 CM
BH CV ECHO MEAS - LVLS AP2: 6 CM
BH CV ECHO MEAS - LVLS AP4: 6.5 CM
BH CV ECHO MEAS - LVOT AREA (M): 3.1 CM^2
BH CV ECHO MEAS - LVOT AREA: 3.1 CM^2
BH CV ECHO MEAS - LVOT DIAM: 2 CM
BH CV ECHO MEAS - LVPWD: 0.87 CM
BH CV ECHO MEAS - MED PEAK E' VEL: 6.6 CM/SEC
BH CV ECHO MEAS - MR MAX PG: 31.5 MMHG
BH CV ECHO MEAS - MR MAX VEL: 280.7 CM/SEC
BH CV ECHO MEAS - MV A DUR: 0.14 SEC
BH CV ECHO MEAS - MV A MAX VEL: 79 CM/SEC
BH CV ECHO MEAS - MV DEC SLOPE: 349.8 CM/SEC^2
BH CV ECHO MEAS - MV DEC TIME: 0.25 SEC
BH CV ECHO MEAS - MV E MAX VEL: 49.6 CM/SEC
BH CV ECHO MEAS - MV E/A: 0.63
BH CV ECHO MEAS - MV MAX PG: 3.2 MMHG
BH CV ECHO MEAS - MV MEAN PG: 0.9 MMHG
BH CV ECHO MEAS - MV P1/2T MAX VEL: 73.7 CM/SEC
BH CV ECHO MEAS - MV P1/2T: 61.7 MSEC
BH CV ECHO MEAS - MV V2 MAX: 90 CM/SEC
BH CV ECHO MEAS - MV V2 MEAN: 42.2 CM/SEC
BH CV ECHO MEAS - MV V2 VTI: 25 CM
BH CV ECHO MEAS - MVA P1/2T LCG: 3 CM^2
BH CV ECHO MEAS - MVA(P1/2T): 3.6 CM^2
BH CV ECHO MEAS - MVA(VTI): 2.8 CM^2
BH CV ECHO MEAS - PA ACC TIME: 0.13 SEC
BH CV ECHO MEAS - PA MAX PG (FULL): 7.8 MMHG
BH CV ECHO MEAS - PA MAX PG: 9.1 MMHG
BH CV ECHO MEAS - PA PR(ACCEL): 18.8 MMHG
BH CV ECHO MEAS - PA V2 MAX: 150.7 CM/SEC
BH CV ECHO MEAS - PULM A REVS DUR: 0.15 SEC
BH CV ECHO MEAS - PULM A REVS VEL: 32.8 CM/SEC
BH CV ECHO MEAS - PULM DIAS VEL: 42.7 CM/SEC
BH CV ECHO MEAS - PULM S/D: 1.4
BH CV ECHO MEAS - PULM SYS VEL: 61.4 CM/SEC
BH CV ECHO MEAS - PVA(V,A): 1.6 CM^2
BH CV ECHO MEAS - PVA(V,D): 1.6 CM^2
BH CV ECHO MEAS - QP/QS: 0.69
BH CV ECHO MEAS - RAP SYSTOLE: 3 MMHG
BH CV ECHO MEAS - RV MAX PG: 1.3 MMHG
BH CV ECHO MEAS - RV MEAN PG: 0.57 MMHG
BH CV ECHO MEAS - RV V1 MAX: 57 CM/SEC
BH CV ECHO MEAS - RV V1 MEAN: 33.2 CM/SEC
BH CV ECHO MEAS - RV V1 VTI: 11.5 CM
BH CV ECHO MEAS - RVOT AREA: 4.2 CM^2
BH CV ECHO MEAS - RVOT DIAM: 2.3 CM
BH CV ECHO MEAS - RVSP: 28 MMHG
BH CV ECHO MEAS - SI(AO): 178.1 ML/M^2
BH CV ECHO MEAS - SI(CUBED): 35.1 ML/M^2
BH CV ECHO MEAS - SI(LVOT): 30.2 ML/M^2
BH CV ECHO MEAS - SI(MOD-SP2): 21.6 ML/M^2
BH CV ECHO MEAS - SI(MOD-SP4): 31.9 ML/M^2
BH CV ECHO MEAS - SI(TEICH): 31.2 ML/M^2
BH CV ECHO MEAS - SUP REN AO DIAM: 2.3 CM
BH CV ECHO MEAS - SV(AO): 412.8 ML
BH CV ECHO MEAS - SV(CUBED): 81.3 ML
BH CV ECHO MEAS - SV(LVOT): 69.9 ML
BH CV ECHO MEAS - SV(MOD-SP2): 50 ML
BH CV ECHO MEAS - SV(MOD-SP4): 74 ML
BH CV ECHO MEAS - SV(RVOT): 48 ML
BH CV ECHO MEAS - SV(TEICH): 72.4 ML
BH CV ECHO MEAS - TAPSE (>1.6): 2.9 CM
BH CV ECHO MEAS - TR MAX VEL: 253.4 CM/SEC
BH CV ECHO MEASUREMENTS AVERAGE E/E' RATIO: 6.57
BH CV NUCLEAR PRIOR STUDY: 2
BH CV REST NUCLEAR ISOTOPE DOSE: 11.2 MCI
BH CV STRESS BP STAGE 1: NORMAL
BH CV STRESS BP STAGE 2: NORMAL
BH CV STRESS BP STAGE 3: NORMAL
BH CV STRESS DURATION MIN STAGE 1: 3
BH CV STRESS DURATION MIN STAGE 2: 3
BH CV STRESS DURATION MIN STAGE 3: 3
BH CV STRESS DURATION MIN STAGE 4: 1
BH CV STRESS DURATION SEC STAGE 1: 0
BH CV STRESS DURATION SEC STAGE 2: 0
BH CV STRESS DURATION SEC STAGE 3: 0
BH CV STRESS DURATION SEC STAGE 4: 0
BH CV STRESS GRADE STAGE 1: 10
BH CV STRESS GRADE STAGE 2: 12
BH CV STRESS GRADE STAGE 3: 14
BH CV STRESS GRADE STAGE 4: 16
BH CV STRESS HR STAGE 1: 95
BH CV STRESS HR STAGE 2: 113
BH CV STRESS HR STAGE 3: 124
BH CV STRESS HR STAGE 4: 128
BH CV STRESS METS STAGE 1: 5
BH CV STRESS METS STAGE 2: 7.5
BH CV STRESS METS STAGE 3: 10
BH CV STRESS METS STAGE 4: 13.5
BH CV STRESS NUCLEAR ISOTOPE DOSE: 35 MCI
BH CV STRESS PROTOCOL 1: NORMAL
BH CV STRESS RECOVERY BP: NORMAL MMHG
BH CV STRESS RECOVERY HR: 89 BPM
BH CV STRESS SPEED STAGE 1: 1.7
BH CV STRESS SPEED STAGE 2: 2.5
BH CV STRESS SPEED STAGE 3: 3.4
BH CV STRESS SPEED STAGE 4: 4.2
BH CV STRESS STAGE 1: 1
BH CV STRESS STAGE 2: 2
BH CV STRESS STAGE 3: 3
BH CV STRESS STAGE 4: 4
BH CV XLRA - RV BASE: 3.9 CM
BH CV XLRA - RV LENGTH: 6.2 CM
BH CV XLRA - RV MID: 2.9 CM
BH CV XLRA - TDI S': 17.2 CM/SEC
LEFT ATRIUM VOLUME INDEX: 31 ML/M2
LV EF 2D ECHO EST: 62 %
LV EF NUC BP: 63 %
MAXIMAL PREDICTED HEART RATE: 147 BPM
PERCENT MAX PREDICTED HR: 87.07 %
SINUS: 3.8 CM
STJ: 3.7 CM
STRESS BASELINE BP: NORMAL MMHG
STRESS BASELINE HR: 73 BPM
STRESS PERCENT HR: 102 %
STRESS POST ESTIMATED WORKLOAD: 11 METS
STRESS POST EXERCISE DUR MIN: 10 MIN
STRESS POST EXERCISE DUR SEC: 0 SEC
STRESS POST PEAK BP: NORMAL MMHG
STRESS POST PEAK HR: 128 BPM
STRESS TARGET HR: 125 BPM

## 2021-03-19 PROCEDURE — 93306 TTE W/DOPPLER COMPLETE: CPT | Performed by: INTERNAL MEDICINE

## 2021-03-19 PROCEDURE — 93016 CV STRESS TEST SUPVJ ONLY: CPT | Performed by: INTERNAL MEDICINE

## 2021-03-19 PROCEDURE — 78452 HT MUSCLE IMAGE SPECT MULT: CPT | Performed by: INTERNAL MEDICINE

## 2021-03-19 PROCEDURE — A9502 TC99M TETROFOSMIN: HCPCS | Performed by: INTERNAL MEDICINE

## 2021-03-19 PROCEDURE — 93017 CV STRESS TEST TRACING ONLY: CPT

## 2021-03-19 PROCEDURE — 93306 TTE W/DOPPLER COMPLETE: CPT

## 2021-03-19 PROCEDURE — 93018 CV STRESS TEST I&R ONLY: CPT | Performed by: INTERNAL MEDICINE

## 2021-03-19 PROCEDURE — 78452 HT MUSCLE IMAGE SPECT MULT: CPT

## 2021-03-19 PROCEDURE — 0 TECHNETIUM TETROFOSMIN KIT: Performed by: INTERNAL MEDICINE

## 2021-03-19 RX ADMIN — TETROFOSMIN 1 DOSE: 1.38 INJECTION, POWDER, LYOPHILIZED, FOR SOLUTION INTRAVENOUS at 09:54

## 2021-03-19 RX ADMIN — TETROFOSMIN 1 DOSE: 1.38 INJECTION, POWDER, LYOPHILIZED, FOR SOLUTION INTRAVENOUS at 10:50

## 2021-03-20 PROBLEM — I71.20 THORACIC AORTIC ANEURYSM WITHOUT RUPTURE: Status: ACTIVE | Noted: 2021-03-20

## 2021-03-20 PROBLEM — I25.810 CORONARY ARTERY DISEASE INVOLVING CORONARY BYPASS GRAFT OF NATIVE HEART WITHOUT ANGINA PECTORIS: Status: ACTIVE | Noted: 2021-03-20

## 2021-03-20 PROBLEM — Z01.810 PREOP CARDIOVASCULAR EXAM: Status: ACTIVE | Noted: 2021-03-20

## 2021-03-22 ENCOUNTER — TELEPHONE (OUTPATIENT)
Dept: CARDIOLOGY | Facility: CLINIC | Age: 76
End: 2021-03-22

## 2021-03-25 ENCOUNTER — TELEPHONE (OUTPATIENT)
Dept: SURGERY | Facility: CLINIC | Age: 76
End: 2021-03-25

## 2021-03-25 ENCOUNTER — HOSPITAL ENCOUNTER (OUTPATIENT)
Dept: PET IMAGING | Facility: HOSPITAL | Age: 76
Discharge: HOME | End: 2021-03-25

## 2021-03-25 DIAGNOSIS — C21.0 ANAL CANCER (HCC): ICD-10-CM

## 2021-03-25 LAB — GLUCOSE BLDC GLUCOMTR-MCNC: 101 MG/DL (ref 70–130)

## 2021-03-25 PROCEDURE — 78815 PET IMAGE W/CT SKULL-THIGH: CPT

## 2021-03-25 PROCEDURE — 82962 GLUCOSE BLOOD TEST: CPT

## 2021-03-25 PROCEDURE — 0 FLUDEOXYGLUCOSE F18 SOLUTION: Performed by: COLON & RECTAL SURGERY

## 2021-03-25 PROCEDURE — A9552 F18 FDG: HCPCS | Performed by: COLON & RECTAL SURGERY

## 2021-03-25 RX ORDER — METOPROLOL SUCCINATE 25 MG/1
25 TABLET, EXTENDED RELEASE ORAL DAILY
Qty: 30 TABLET | Refills: 0 | Status: SHIPPED | OUTPATIENT
Start: 2021-03-25 | End: 2021-04-17 | Stop reason: HOSPADM

## 2021-03-25 RX ADMIN — FLUDEOXYGLUCOSE F18 1 DOSE: 300 INJECTION INTRAVENOUS at 12:36

## 2021-03-25 NOTE — TELEPHONE ENCOUNTER
Pt called, wanted to leave a message for . He would like for  to call him when PET scan results come back. Pt aware that it can take a few days to receive results.

## 2021-03-29 DIAGNOSIS — C21.0 ANAL CANCER (HCC): ICD-10-CM

## 2021-03-29 DIAGNOSIS — R59.9 LYMPH NODES ENLARGED: Primary | ICD-10-CM

## 2021-03-30 ENCOUNTER — TRANSCRIBE ORDERS (OUTPATIENT)
Dept: PREADMISSION TESTING | Facility: HOSPITAL | Age: 76
End: 2021-03-30

## 2021-03-30 DIAGNOSIS — Z01.818 OTHER SPECIFIED PRE-OPERATIVE EXAMINATION: Primary | ICD-10-CM

## 2021-04-01 ENCOUNTER — TELEPHONE (OUTPATIENT)
Dept: CARDIOLOGY | Facility: CLINIC | Age: 76
End: 2021-04-01

## 2021-04-02 ENCOUNTER — APPOINTMENT (OUTPATIENT)
Dept: PREADMISSION TESTING | Facility: HOSPITAL | Age: 76
End: 2021-04-02

## 2021-04-02 ENCOUNTER — HOSPITAL ENCOUNTER (OUTPATIENT)
Dept: ULTRASOUND IMAGING | Facility: HOSPITAL | Age: 76
Discharge: HOME | End: 2021-04-02

## 2021-04-02 VITALS
HEIGHT: 72 IN | HEART RATE: 58 BPM | SYSTOLIC BLOOD PRESSURE: 140 MMHG | BODY MASS INDEX: 33.18 KG/M2 | OXYGEN SATURATION: 96 % | WEIGHT: 245 LBS | DIASTOLIC BLOOD PRESSURE: 89 MMHG | TEMPERATURE: 97.8 F | RESPIRATION RATE: 18 BRPM

## 2021-04-02 VITALS
HEIGHT: 72 IN | OXYGEN SATURATION: 97 % | DIASTOLIC BLOOD PRESSURE: 77 MMHG | RESPIRATION RATE: 16 BRPM | HEART RATE: 67 BPM | SYSTOLIC BLOOD PRESSURE: 121 MMHG | WEIGHT: 248 LBS | TEMPERATURE: 98.1 F | BODY MASS INDEX: 33.59 KG/M2

## 2021-04-02 DIAGNOSIS — C21.0 ANAL CANCER (HCC): ICD-10-CM

## 2021-04-02 DIAGNOSIS — R59.9 LYMPH NODES ENLARGED: ICD-10-CM

## 2021-04-02 LAB
ABO GROUP BLD: NORMAL
ANION GAP SERPL CALCULATED.3IONS-SCNC: 8.6 MMOL/L (ref 5–15)
BLD GP AB SCN SERPL QL: NEGATIVE
BUN SERPL-MCNC: 25 MG/DL (ref 8–23)
BUN/CREAT SERPL: 22.3 (ref 7–25)
CALCIUM SPEC-SCNC: 8.6 MG/DL (ref 8.6–10.5)
CEA SERPL-MCNC: 1.1 NG/ML
CHLORIDE SERPL-SCNC: 105 MMOL/L (ref 98–107)
CO2 SERPL-SCNC: 31.4 MMOL/L (ref 22–29)
CREAT SERPL-MCNC: 1.12 MG/DL (ref 0.76–1.27)
DEPRECATED RDW RBC AUTO: 42.5 FL (ref 37–54)
ERYTHROCYTE [DISTWIDTH] IN BLOOD BY AUTOMATED COUNT: 12.9 % (ref 12.3–15.4)
GFR SERPL CREATININE-BSD FRML MDRD: 64 ML/MIN/1.73
GLUCOSE SERPL-MCNC: 114 MG/DL (ref 65–99)
HCT VFR BLD AUTO: 40.3 % (ref 37.5–51)
HGB BLD-MCNC: 13.3 G/DL (ref 13–17.7)
MCH RBC QN AUTO: 29.5 PG (ref 26.6–33)
MCHC RBC AUTO-ENTMCNC: 33 G/DL (ref 31.5–35.7)
MCV RBC AUTO: 89.4 FL (ref 79–97)
PLATELET # BLD AUTO: 160 10*3/MM3 (ref 140–450)
PMV BLD AUTO: 11.2 FL (ref 6–12)
POTASSIUM SERPL-SCNC: 4 MMOL/L (ref 3.5–5.2)
RBC # BLD AUTO: 4.51 10*6/MM3 (ref 4.14–5.8)
RH BLD: POSITIVE
SODIUM SERPL-SCNC: 145 MMOL/L (ref 136–145)
T&S EXPIRATION DATE: NORMAL
WBC # BLD AUTO: 4.91 10*3/MM3 (ref 3.4–10.8)

## 2021-04-02 PROCEDURE — 86901 BLOOD TYPING SEROLOGIC RH(D): CPT

## 2021-04-02 PROCEDURE — 85027 COMPLETE CBC AUTOMATED: CPT

## 2021-04-02 PROCEDURE — 88341 IMHCHEM/IMCYTCHM EA ADD ANTB: CPT | Performed by: COLON & RECTAL SURGERY

## 2021-04-02 PROCEDURE — 86900 BLOOD TYPING SEROLOGIC ABO: CPT

## 2021-04-02 PROCEDURE — 88305 TISSUE EXAM BY PATHOLOGIST: CPT | Performed by: COLON & RECTAL SURGERY

## 2021-04-02 PROCEDURE — 82378 CARCINOEMBRYONIC ANTIGEN: CPT

## 2021-04-02 PROCEDURE — 76942 ECHO GUIDE FOR BIOPSY: CPT

## 2021-04-02 PROCEDURE — 80048 BASIC METABOLIC PNL TOTAL CA: CPT

## 2021-04-02 PROCEDURE — 86850 RBC ANTIBODY SCREEN: CPT

## 2021-04-02 PROCEDURE — 25010000003 LIDOCAINE 1 % SOLUTION: Performed by: RADIOLOGY

## 2021-04-02 PROCEDURE — 36415 COLL VENOUS BLD VENIPUNCTURE: CPT

## 2021-04-02 PROCEDURE — 88342 IMHCHEM/IMCYTCHM 1ST ANTB: CPT | Performed by: COLON & RECTAL SURGERY

## 2021-04-02 RX ORDER — LIDOCAINE HYDROCHLORIDE 10 MG/ML
10 INJECTION, SOLUTION INFILTRATION; PERINEURAL ONCE
Status: COMPLETED | OUTPATIENT
Start: 2021-04-02 | End: 2021-04-02

## 2021-04-02 RX ORDER — CHLORHEXIDINE GLUCONATE 500 MG/1
CLOTH TOPICAL TAKE AS DIRECTED
Status: ON HOLD | COMMUNITY
End: 2021-04-06

## 2021-04-02 RX ADMIN — LIDOCAINE HYDROCHLORIDE 6 ML: 10 INJECTION, SOLUTION INFILTRATION; PERINEURAL at 08:00

## 2021-04-02 NOTE — NURSING NOTE
04/02/21 1425   Stoma Site Marking   Procedure Marking For colostomy   Site Marked LUQ: left upper quadrant   Patient Assessment Screen rectus muscle identified;marked within patient's visual field;bony prominences avoided;waistband avoided;creases/scars avoided   How Was Patient Marked? surgical marker;transparent dressing   Stoma Marking Comments [facility] met with patient and his daughter. Provided information about colostomy, pouch, supplies, adls. Provided booklet. Marked patient in LUQ. LLQ is close to abdominal fold/pant line.   Patient Position During Marking sitting;standing

## 2021-04-02 NOTE — DISCHARGE INSTRUCTIONS
EDUCATION /DISCHARGE INSTRUCTIONS  CT/US guided biopsy:  A biopsy is a procedure done to remove tissue for further analysis.  Before images are taken to locate the target area.  Images can be obtained using ultrasound, CT or MRI.  A physician will clean your skin with antiseptic soap, place a sterile towel around the site and administer a local anesthetic to numb the area.  The physician will then insert a special needle.  Sometimes images are taken of the needle after it is inserted to ensure the needle is in the correct area to be biopsied.   A sample is obtained and sent to the laboratory for study.  Occasionally the laboratory is unable to make a diagnosis from the sample and the procedure may need to be repeated.  Within a week the radiologist will send a report to your physician.  A pathologist will also examine the tissue and send a report.    Risks of the procedure include but are not limited to:   *  Bleeding    *  Infection   *  Puncture of surrounding organs *  Death     *  Lung collapse if the biopsy is near the chest which may require insertion of a      chest tube to re-inflate the lung if severe.    Benefits of the procedure:  Using x-ray helps to locate the area that requires a biopsy. The procedure is less invasive than a surgical procedure, there are no large incisions and it does not require anesthesia.    Alternatives to the procedure:  A biopsy can be performed surgically.  Risks of a surgical biopsy include exposure to anesthesia, infection, excessive bleeding and injury to abdominal organs.  A benefit of surgical biopsy is the ability to see the area to be biopsied and remove of a larger piece of tissue.    THIS EDUCATION INFORMATION WAS REVIEWED PRIOR TO PROCEDURE AND CONSENT. Patient initials__________________Time___0720________________    Post Procedure:    *  Expect the biopsy site may be tender up to one week.    *  Rest today (no pushing pulling or straining).   *  Slowly increase  activity tomorrow.    *  If you received sedation do not drive for 24 hours.   *  Keep dressing clean and dry.   *  Leave dressing on puncture site for 24 hours.    *  You may shower when dressing removed.  Call your doctor if experiencing:   *  Signs of infection such as redness, swelling, excessive pain and / or foul        smelling drainage from the puncture site.   *  Chills or fever over 101 degrees (by mouth).   *  Unrelieved pain.   *  Any new or severe symptoms.   *  If experiencing sudden / severe shortness of breath or chest pain go to the       nearest emergency room.   Following the procedure:     Follow-up with the ordering physician as directed.    Continue to take other medications as directed by your physician unless    otherwise instructed.   If applicable, resume taking your blood thinners or Aspirin on _April 3, 2021 after 9:00 AM__________.    If you have any concerns please call the Radiology Nurses Desk at [phone].  You are the most important factor in your recovery.  Follow the above instructions carefully.

## 2021-04-02 NOTE — DISCHARGE INSTRUCTIONS
Take the following medications the morning of surgery: HYDRALAZINE, METOPROLOL    ARRIVAL TIME TO MAIN SURGERY ON 4/6/21 IS 5:30 AM.    READ YOUR ENHANCED RECOVERY BOOKLET      If you are on prescription narcotic pain medication to control your pain you may also take that medication the morning of surgery.    General Instructions:  • Do not eat solid food after midnight the night before surgery.  • You may drink clear liquids day of surgery but must stop at least one hour before your hospital arrival time. CUTOFF TIME IS 4:30 AM.  • It is beneficial for you to have a clear drink that contains carbohydrates the day of surgery.  We suggest a 12 to 20 ounce bottle of Gatorade or Powerade for non-diabetic patients or a 12 to 20 ounce bottle of G2 or Powerade Zero for diabetic patients. (Pediatric patients, are not advised to drink a 12 to 20 ounce carbohydrate drink)    Clear liquids are liquids you can see through.  Nothing red in color.     Plain water                               Sports drinks  Sodas                                   Gelatin (Jell-O)  Fruit juices without pulp such as white grape juice and apple juice  Popsicles that contain no fruit or yogurt  Tea or coffee (no cream or milk added)  Gatorade / Powerade  G2 / Powerade Zero    • Patients who avoid smoking, chewing tobacco and alcohol for 4 weeks prior to surgery have a reduced risk of post-operative complications.  Quit smoking as many days before surgery as you can.  • Do not smoke, use chewing tobacco or drink alcohol the day of surgery.   • If applicable bring your C-PAP/ BI-PAP machine.  • Bring any papers given to you in the doctor’s office.  • Wear clean comfortable clothes.  • Do not wear contact lenses, false eyelashes or make-up.  Bring a case for your glasses.   • Bring crutches or walker if applicable.  • Remove all piercings.  Leave jewelry and any other valuables at home.  • Hair extensions with metal clips must be removed prior to  surgery.  • The Pre-Admission Testing nurse will instruct you to bring medications if unable to obtain an accurate list in Pre-Admission Testing.        If you were given a blood bank ID arm band remember to bring it with you the day of surgery.    Preventing a Surgical Site Infection:  • For 2 to 3 days before surgery, avoid shaving with a razor because the razor can irritate skin and make it easier to develop an infection.    • Any areas of open skin can increase the risk of a post-operative wound infection by allowing bacteria to enter and travel throughout the body.  Notify your surgeon if you have any skin wounds / rashes even if it is not near the expected surgical site.  The area will need assessed to determine if surgery should be delayed until it is healed.  • The night prior to surgery shower using a fresh bar of anti-bacterial soap (such as Dial) and clean washcloth.  Sleep in a clean bed with clean clothing.  Do not allow pets to sleep with you.  • Shower on the morning of surgery using a fresh bar of anti-bacterial soap (such as Dial) and clean washcloth.  Dry with a clean towel and dress in clean clothing.  • Ask your surgeon if you will be receiving antibiotics prior to surgery.  • Make sure you, your family, and all healthcare providers clean their hands with soap and water or an alcohol based hand  before caring for you or your wound.    Day of surgery:  Your arrival time is approximately two hours before your scheduled surgery time.  Upon arrival, a Pre-op nurse and Anesthesiologist will review your health history, obtain vital signs, and answer questions you may have.  The only belongings needed at this time will be a list of your home medications and if applicable your C-PAP/BI-PAP machine.  A Pre-op nurse will start an IV and you may receive medication in preparation for surgery, including something to help you relax.     Please be aware that surgery does come with discomfort.  We want  to make every effort to control your discomfort so please discuss any uncontrolled symptoms with your nurse.   Your doctor will most likely have prescribed pain medications.      If you are going home after surgery you will receive individualized written care instructions before being discharged.  A responsible adult must drive you to and from the hospital on the day of your surgery and stay with you for 24 hours.  Discharge prescriptions can be filled by the hospital pharmacy during regular pharmacy hours.  If you are having surgery late in the day/evening your prescription may be e-prescribed to your pharmacy.  Please verify your pharmacy hours or chose a 24 hour pharmacy to avoid not having access to your prescription because your pharmacy has closed for the day.    If you are staying overnight following surgery, you will be transported to your hospital room following the recovery period.  [facility] has all private rooms.    If you have any questions please call Pre-Admission Testing at [phone].  Deductibles and co-payments are collected on the day of service. Please be prepared to pay the required co-pay, deductible or deposit on the day of service as defined by your plan.    Patient Education for Self-Quarantine Process    Following your COVID testing, we strongly recommend that you do not leave your home after you have been tested for COVID except to get medical care. This includes not going to work, school or to public areas.  If this is not possible for you to do please limit your activities to only required outings.  Be sure to wear a mask when you are with other people, practice social distancing and wash your hands frequently.      The following items provide additional details to keep you safe.  • Wash your hands with soap and water frequently for at least 20 seconds.   • Avoid touching your eyes, nose and mouth with unwashed hands.  • Do not share anything - utensils, towels, food  from the same bowl.   • Have your own utensils, drinking glass, dishes, towels and bedding.   • Do not have visitors.   • Do use FaceTime to stay in touch with family and friends.  • You should stay in a specific room away from others if possible.   • Stay at least 6 feet away from others in the home if you cannot have a dedicated room to yourself.   • Do not snuggle with your pet. While the CDC says there is no evidence that pets can spread COVID-19 or be infected from humans, it is probably best to avoid “petting, snuggling, being kissed or licked and sharing food (during self-quarantine)”, according to the CDC.   • Sanitize household surfaces daily. Include all high touch areas (door handles, light switches, phones, countertops, etc.)  • Do not share a bathroom with others, if possible.   • Wear a mask around others in your home if you are unable to stay in a separate room or 6 feet apart. If  you are unable to wear a mask, have your family member wear a mask if they must be within 6 feet of you.   Call your surgeon immediately if you experience any of the following symptoms:  • Sore Throat  • Shortness of Breath or difficulty breathing  • Cough  • Chills  • Body soreness or muscle pain  • Headache  • Fever  • New loss of taste or smell  • Do not arrive for your surgery ill.  Your procedure will need to be rescheduled to another time.  You will need to call your physician before the day of surgery to avoid any unnecessary exposure to hospital staff as well as other patients.    CHLORHEXIDINE CLOTH INSTRUCTIONS  The morning of surgery follow these instructions using the Chlorhexidine cloths you've been given.  These steps reduce bacteria on the body.  Do not use the cloths near your eyes, ears mouth, genitalia or on open wounds.  Throw the cloths away after use but do not try to flush them down a toilet.      • Open and remove one cloth at a time from the package.    • Leave the cloth unfolded and begin the  bathing.  • Massage the skin with the cloths using gentle pressure to remove bacteria.  Do not scrub harshly.   • Follow the steps below with one 2% CHG cloth per area (6 total cloths).  • One cloth for neck, shoulders and chest.  • One cloth for both arms, hands, fingers and underarms (do underarms last).  • One cloth for the abdomen followed by groin.  • One cloth for right leg and foot including between the toes.  • One cloth for left leg and foot including between the toes.  • The last cloth is to be used for the back of the neck, back and buttocks.    Allow the CHG to air dry 3 minutes on the skin which will give it time to work and decrease the chance of irritation.  The skin may feel sticky until it is dry.  Do not rinse with water or any other liquid or you will lose the beneficial effects of the CHG.  If mild skin irritation occurs, do rinse the skin to remove the CHG.  Report this to the nurse at time of admission.  Do not apply lotions, creams, ointments, deodorants or perfumes after using the clothes. Dress in clean clothes before coming to the hospital.

## 2021-04-02 NOTE — NURSING NOTE
Pt in xray triage for US Right groin bx  Pt wearing mask; RN wearing mask and eye protection for all pt interactions

## 2021-04-03 ENCOUNTER — LAB (OUTPATIENT)
Dept: LAB | Facility: HOSPITAL | Age: 76
End: 2021-04-03

## 2021-04-03 ENCOUNTER — TELEPHONE (OUTPATIENT)
Dept: INTERVENTIONAL RADIOLOGY/VASCULAR | Facility: HOSPITAL | Age: 76
End: 2021-04-03

## 2021-04-03 ENCOUNTER — APPOINTMENT (OUTPATIENT)
Dept: LAB | Facility: HOSPITAL | Age: 76
End: 2021-04-03

## 2021-04-03 DIAGNOSIS — Z01.818 OTHER SPECIFIED PRE-OPERATIVE EXAMINATION: ICD-10-CM

## 2021-04-03 PROCEDURE — U0005 INFEC AGEN DETEC AMPLI PROBE: HCPCS

## 2021-04-03 PROCEDURE — U0004 COV-19 TEST NON-CDC HGH THRU: HCPCS

## 2021-04-03 PROCEDURE — C9803 HOPD COVID-19 SPEC COLLECT: HCPCS

## 2021-04-05 ENCOUNTER — TELEPHONE (OUTPATIENT)
Dept: SURGERY | Facility: CLINIC | Age: 76
End: 2021-04-05

## 2021-04-05 LAB
LAB AP CASE REPORT: NORMAL
LAB AP CLINICAL INFORMATION: NORMAL
LAB AP SPECIAL STAINS: NORMAL
PATH REPORT.FINAL DX SPEC: NORMAL
PATH REPORT.GROSS SPEC: NORMAL
SARS-COV-2 RNA RESP QL NAA+PROBE: NOT DETECTED

## 2021-04-05 NOTE — TELEPHONE ENCOUNTER
Daughter calling for bx results, (she is asking that you not call patient with results if they are josephine) from Friday. Also asking if you can call in Rx for anxiety to take before surgery tomorrow.

## 2021-04-06 ENCOUNTER — ANESTHESIA EVENT (OUTPATIENT)
Dept: PERIOP | Facility: HOSPITAL | Age: 76
End: 2021-04-06

## 2021-04-06 ENCOUNTER — HOSPITAL ENCOUNTER (INPATIENT)
Facility: HOSPITAL | Age: 76
LOS: 6 days | Discharge: HOME HEALTH | End: 2021-04-12
Attending: COLON & RECTAL SURGERY | Admitting: COLON & RECTAL SURGERY

## 2021-04-06 ENCOUNTER — ANESTHESIA (OUTPATIENT)
Dept: PERIOP | Facility: HOSPITAL | Age: 76
End: 2021-04-06

## 2021-04-06 DIAGNOSIS — C21.0 ANAL CANCER (HCC): ICD-10-CM

## 2021-04-06 PROCEDURE — 25010000002 LIDOCAINE PER 10 MG: Performed by: STUDENT IN AN ORGANIZED HEALTH CARE EDUCATION/TRAINING PROGRAM

## 2021-04-06 PROCEDURE — 25010000002 FENTANYL CITRATE (PF) 100 MCG/2ML SOLUTION: Performed by: STUDENT IN AN ORGANIZED HEALTH CARE EDUCATION/TRAINING PROGRAM

## 2021-04-06 PROCEDURE — 8E0W4CZ ROBOTIC ASSISTED PROCEDURE OF TRUNK REGION, PERCUTANEOUS ENDOSCOPIC APPROACH: ICD-10-PCS | Performed by: COLON & RECTAL SURGERY

## 2021-04-06 PROCEDURE — 25010000003 CEFAZOLIN IN DEXTROSE 2-4 GM/100ML-% SOLUTION: Performed by: STUDENT IN AN ORGANIZED HEALTH CARE EDUCATION/TRAINING PROGRAM

## 2021-04-06 PROCEDURE — 88307 TISSUE EXAM BY PATHOLOGIST: CPT | Performed by: COLON & RECTAL SURGERY

## 2021-04-06 PROCEDURE — 25010000002 ALBUMIN HUMAN 5% PER 50 ML: Performed by: STUDENT IN AN ORGANIZED HEALTH CARE EDUCATION/TRAINING PROGRAM

## 2021-04-06 PROCEDURE — 0KTM0ZZ RESECTION OF PERINEUM MUSCLE, OPEN APPROACH: ICD-10-PCS | Performed by: COLON & RECTAL SURGERY

## 2021-04-06 PROCEDURE — 25010000002 NEOSTIGMINE 5 MG/10ML SOLUTION: Performed by: STUDENT IN AN ORGANIZED HEALTH CARE EDUCATION/TRAINING PROGRAM

## 2021-04-06 PROCEDURE — 25010000003 CEFAZOLIN IN DEXTROSE 2-4 GM/100ML-% SOLUTION: Performed by: COLON & RECTAL SURGERY

## 2021-04-06 PROCEDURE — 25010000002 MAGNESIUM SULFATE PER 500 MG OF MAGNESIUM: Performed by: STUDENT IN AN ORGANIZED HEALTH CARE EDUCATION/TRAINING PROGRAM

## 2021-04-06 PROCEDURE — 0D1N0Z4 BYPASS SIGMOID COLON TO CUTANEOUS, OPEN APPROACH: ICD-10-PCS | Performed by: COLON & RECTAL SURGERY

## 2021-04-06 PROCEDURE — C9290 INJ, BUPIVACAINE LIPOSOME: HCPCS | Performed by: COLON & RECTAL SURGERY

## 2021-04-06 PROCEDURE — P9041 ALBUMIN (HUMAN),5%, 50ML: HCPCS | Performed by: COLON & RECTAL SURGERY

## 2021-04-06 PROCEDURE — 25010000002 DEXAMETHASONE PER 1 MG: Performed by: STUDENT IN AN ORGANIZED HEALTH CARE EDUCATION/TRAINING PROGRAM

## 2021-04-06 PROCEDURE — 25010000002 ALBUMIN HUMAN 5% PER 50 ML: Performed by: COLON & RECTAL SURGERY

## 2021-04-06 PROCEDURE — 88331 PATH CONSLTJ SURG 1 BLK 1SPC: CPT | Performed by: COLON & RECTAL SURGERY

## 2021-04-06 PROCEDURE — 25010000002 HYDROMORPHONE PER 4 MG: Performed by: COLON & RECTAL SURGERY

## 2021-04-06 PROCEDURE — 0DTQ4ZZ RESECTION OF ANUS, PERCUTANEOUS ENDOSCOPIC APPROACH: ICD-10-PCS | Performed by: COLON & RECTAL SURGERY

## 2021-04-06 PROCEDURE — 25010000002 PROPOFOL 10 MG/ML EMULSION: Performed by: STUDENT IN AN ORGANIZED HEALTH CARE EDUCATION/TRAINING PROGRAM

## 2021-04-06 PROCEDURE — 25010000003 BUPIVACAINE LIPOSOME 1.3 % SUSPENSION: Performed by: ANESTHESIOLOGY

## 2021-04-06 PROCEDURE — 0DTP4ZZ RESECTION OF RECTUM, PERCUTANEOUS ENDOSCOPIC APPROACH: ICD-10-PCS | Performed by: COLON & RECTAL SURGERY

## 2021-04-06 PROCEDURE — 25010000002 PHENYLEPHRINE 10 MG/ML SOLUTION 5 ML VIAL: Performed by: STUDENT IN AN ORGANIZED HEALTH CARE EDUCATION/TRAINING PROGRAM

## 2021-04-06 PROCEDURE — 0DBN4ZZ EXCISION OF SIGMOID COLON, PERCUTANEOUS ENDOSCOPIC APPROACH: ICD-10-PCS | Performed by: COLON & RECTAL SURGERY

## 2021-04-06 PROCEDURE — P9041 ALBUMIN (HUMAN),5%, 50ML: HCPCS | Performed by: STUDENT IN AN ORGANIZED HEALTH CARE EDUCATION/TRAINING PROGRAM

## 2021-04-06 PROCEDURE — 0KXR0ZZ TRANSFER LEFT UPPER LEG MUSCLE, OPEN APPROACH: ICD-10-PCS | Performed by: PLASTIC SURGERY

## 2021-04-06 PROCEDURE — C9290 INJ, BUPIVACAINE LIPOSOME: HCPCS | Performed by: ANESTHESIOLOGY

## 2021-04-06 PROCEDURE — 0JX90ZB TRANSFER BUTTOCK SUBCUTANEOUS TISSUE AND FASCIA WITH SKIN AND SUBCUTANEOUS TISSUE, OPEN APPROACH: ICD-10-PCS | Performed by: PLASTIC SURGERY

## 2021-04-06 PROCEDURE — 25010000003 BUPIVACAINE LIPOSOME 1.3 % SUSPENSION 20 ML VIAL: Performed by: COLON & RECTAL SURGERY

## 2021-04-06 PROCEDURE — 45395 LAP REMOVAL OF RECTUM: CPT | Performed by: COLON & RECTAL SURGERY

## 2021-04-06 PROCEDURE — 88309 TISSUE EXAM BY PATHOLOGIST: CPT | Performed by: COLON & RECTAL SURGERY

## 2021-04-06 PROCEDURE — 25010000002 PHENYLEPHRINE PER 1 ML: Performed by: STUDENT IN AN ORGANIZED HEALTH CARE EDUCATION/TRAINING PROGRAM

## 2021-04-06 PROCEDURE — 25010000002 ONDANSETRON PER 1 MG: Performed by: STUDENT IN AN ORGANIZED HEALTH CARE EDUCATION/TRAINING PROGRAM

## 2021-04-06 DEVICE — LIGACLIP MCA MULTIPLE CLIP APPLIERS, 20 MEDIUM CLIPS
Type: IMPLANTABLE DEVICE | Site: LEG | Status: FUNCTIONAL
Brand: LIGACLIP

## 2021-04-06 DEVICE — STAPLER 60 RELOAD BLUE
Type: IMPLANTABLE DEVICE | Site: ABDOMEN | Status: FUNCTIONAL
Brand: SUREFORM

## 2021-04-06 RX ORDER — GABAPENTIN 300 MG/1
300 CAPSULE ORAL 2 TIMES DAILY
Status: COMPLETED | OUTPATIENT
Start: 2021-04-06 | End: 2021-04-10

## 2021-04-06 RX ORDER — ACETAMINOPHEN 10 MG/ML
1000 INJECTION, SOLUTION INTRAVENOUS ONCE
Status: COMPLETED | OUTPATIENT
Start: 2021-04-06 | End: 2021-04-06

## 2021-04-06 RX ORDER — MAGNESIUM HYDROXIDE 1200 MG/15ML
LIQUID ORAL AS NEEDED
Status: DISCONTINUED | OUTPATIENT
Start: 2021-04-06 | End: 2021-04-06 | Stop reason: HOSPADM

## 2021-04-06 RX ORDER — GINSENG 100 MG
CAPSULE ORAL DAILY
Status: DISCONTINUED | OUTPATIENT
Start: 2021-04-06 | End: 2021-04-12 | Stop reason: HOSPADM

## 2021-04-06 RX ORDER — GINSENG 100 MG
CAPSULE ORAL AS NEEDED
Status: DISCONTINUED | OUTPATIENT
Start: 2021-04-06 | End: 2021-04-06 | Stop reason: HOSPADM

## 2021-04-06 RX ORDER — ALBUMIN, HUMAN INJ 5% 5 %
500 SOLUTION INTRAVENOUS ONCE
Status: COMPLETED | OUTPATIENT
Start: 2021-04-06 | End: 2021-04-06

## 2021-04-06 RX ORDER — FENTANYL CITRATE 50 UG/ML
INJECTION, SOLUTION INTRAMUSCULAR; INTRAVENOUS AS NEEDED
Status: DISCONTINUED | OUTPATIENT
Start: 2021-04-06 | End: 2021-04-06 | Stop reason: SURG

## 2021-04-06 RX ORDER — SODIUM CHLORIDE, SODIUM LACTATE, POTASSIUM CHLORIDE, CALCIUM CHLORIDE 600; 310; 30; 20 MG/100ML; MG/100ML; MG/100ML; MG/100ML
INJECTION, SOLUTION INTRAVENOUS CONTINUOUS PRN
Status: DISCONTINUED | OUTPATIENT
Start: 2021-04-06 | End: 2021-04-06 | Stop reason: SURG

## 2021-04-06 RX ORDER — DEXAMETHASONE SODIUM PHOSPHATE 10 MG/ML
INJECTION INTRAMUSCULAR; INTRAVENOUS AS NEEDED
Status: DISCONTINUED | OUTPATIENT
Start: 2021-04-06 | End: 2021-04-06 | Stop reason: SURG

## 2021-04-06 RX ORDER — SODIUM CHLORIDE, SODIUM LACTATE, POTASSIUM CHLORIDE, CALCIUM CHLORIDE 600; 310; 30; 20 MG/100ML; MG/100ML; MG/100ML; MG/100ML
9 INJECTION, SOLUTION INTRAVENOUS CONTINUOUS
Status: DISCONTINUED | OUTPATIENT
Start: 2021-04-06 | End: 2021-04-06

## 2021-04-06 RX ORDER — CEFAZOLIN SODIUM 2 G/100ML
2 INJECTION, SOLUTION INTRAVENOUS ONCE
Status: COMPLETED | OUTPATIENT
Start: 2021-04-06 | End: 2021-04-06

## 2021-04-06 RX ORDER — GABAPENTIN 300 MG/1
600 CAPSULE ORAL 3 TIMES DAILY
Status: DISCONTINUED | OUTPATIENT
Start: 2021-04-06 | End: 2021-04-06 | Stop reason: HOSPADM

## 2021-04-06 RX ORDER — CELECOXIB 200 MG/1
200 CAPSULE ORAL EVERY 12 HOURS SCHEDULED
Status: COMPLETED | OUTPATIENT
Start: 2021-04-06 | End: 2021-04-10

## 2021-04-06 RX ORDER — BUPIVACAINE HYDROCHLORIDE AND EPINEPHRINE 2.5; 5 MG/ML; UG/ML
INJECTION, SOLUTION INFILTRATION; PERINEURAL AS NEEDED
Status: DISCONTINUED | OUTPATIENT
Start: 2021-04-06 | End: 2021-04-06 | Stop reason: HOSPADM

## 2021-04-06 RX ORDER — NALOXONE HCL 0.4 MG/ML
0.4 VIAL (ML) INJECTION
Status: DISCONTINUED | OUTPATIENT
Start: 2021-04-06 | End: 2021-04-12 | Stop reason: HOSPADM

## 2021-04-06 RX ORDER — SODIUM CHLORIDE, SODIUM LACTATE, POTASSIUM CHLORIDE, CALCIUM CHLORIDE 600; 310; 30; 20 MG/100ML; MG/100ML; MG/100ML; MG/100ML
50 INJECTION, SOLUTION INTRAVENOUS CONTINUOUS
Status: DISCONTINUED | OUTPATIENT
Start: 2021-04-06 | End: 2021-04-10

## 2021-04-06 RX ORDER — ACETAMINOPHEN 500 MG
1000 TABLET ORAL EVERY 6 HOURS
Status: DISCONTINUED | OUTPATIENT
Start: 2021-04-06 | End: 2021-04-06 | Stop reason: HOSPADM

## 2021-04-06 RX ORDER — PROPOFOL 10 MG/ML
VIAL (ML) INTRAVENOUS AS NEEDED
Status: DISCONTINUED | OUTPATIENT
Start: 2021-04-06 | End: 2021-04-06 | Stop reason: SURG

## 2021-04-06 RX ORDER — FAMOTIDINE 10 MG/ML
20 INJECTION, SOLUTION INTRAVENOUS ONCE
Status: COMPLETED | OUTPATIENT
Start: 2021-04-06 | End: 2021-04-06

## 2021-04-06 RX ORDER — LIDOCAINE HYDROCHLORIDE ANHYDROUS AND DEXTROSE MONOHYDRATE 5; 400 G/100ML; MG/100ML
INJECTION, SOLUTION INTRAVENOUS CONTINUOUS PRN
Status: DISCONTINUED | OUTPATIENT
Start: 2021-04-06 | End: 2021-04-06 | Stop reason: SURG

## 2021-04-06 RX ORDER — CELECOXIB 200 MG/1
200 CAPSULE ORAL EVERY 12 HOURS SCHEDULED
Status: DISCONTINUED | OUTPATIENT
Start: 2021-04-06 | End: 2021-04-06 | Stop reason: HOSPADM

## 2021-04-06 RX ORDER — CEFAZOLIN SODIUM 2 G/100ML
INJECTION, SOLUTION INTRAVENOUS AS NEEDED
Status: DISCONTINUED | OUTPATIENT
Start: 2021-04-06 | End: 2021-04-06 | Stop reason: SURG

## 2021-04-06 RX ORDER — EPHEDRINE SULFATE 50 MG/ML
INJECTION, SOLUTION INTRAVENOUS AS NEEDED
Status: DISCONTINUED | OUTPATIENT
Start: 2021-04-06 | End: 2021-04-06 | Stop reason: SURG

## 2021-04-06 RX ORDER — HYDROMORPHONE HYDROCHLORIDE 1 MG/ML
0.25 INJECTION, SOLUTION INTRAMUSCULAR; INTRAVENOUS; SUBCUTANEOUS
Status: DISCONTINUED | OUTPATIENT
Start: 2021-04-06 | End: 2021-04-12 | Stop reason: HOSPADM

## 2021-04-06 RX ORDER — NITROGLYCERIN 0.4 MG/1
0.4 TABLET SUBLINGUAL
Status: DISCONTINUED | OUTPATIENT
Start: 2021-04-06 | End: 2021-04-12 | Stop reason: HOSPADM

## 2021-04-06 RX ORDER — GLYCOPYRROLATE 0.2 MG/ML
INJECTION INTRAMUSCULAR; INTRAVENOUS AS NEEDED
Status: DISCONTINUED | OUTPATIENT
Start: 2021-04-06 | End: 2021-04-06 | Stop reason: SURG

## 2021-04-06 RX ORDER — ROCURONIUM BROMIDE 10 MG/ML
INJECTION, SOLUTION INTRAVENOUS AS NEEDED
Status: DISCONTINUED | OUTPATIENT
Start: 2021-04-06 | End: 2021-04-06 | Stop reason: SURG

## 2021-04-06 RX ORDER — NALOXONE HCL 0.4 MG/ML
0.4 VIAL (ML) INJECTION
Status: DISCONTINUED | OUTPATIENT
Start: 2021-04-06 | End: 2021-04-06 | Stop reason: HOSPADM

## 2021-04-06 RX ORDER — NEOSTIGMINE METHYLSULFATE 0.5 MG/ML
INJECTION, SOLUTION INTRAVENOUS AS NEEDED
Status: DISCONTINUED | OUTPATIENT
Start: 2021-04-06 | End: 2021-04-06 | Stop reason: SURG

## 2021-04-06 RX ORDER — HYDROMORPHONE HYDROCHLORIDE 1 MG/ML
0.25 INJECTION, SOLUTION INTRAMUSCULAR; INTRAVENOUS; SUBCUTANEOUS
Status: DISCONTINUED | OUTPATIENT
Start: 2021-04-06 | End: 2021-04-06 | Stop reason: HOSPADM

## 2021-04-06 RX ORDER — SODIUM CHLORIDE 0.9 % (FLUSH) 0.9 %
10 SYRINGE (ML) INJECTION AS NEEDED
Status: DISCONTINUED | OUTPATIENT
Start: 2021-04-06 | End: 2021-04-06 | Stop reason: HOSPADM

## 2021-04-06 RX ORDER — MIDAZOLAM HYDROCHLORIDE 1 MG/ML
1 INJECTION INTRAMUSCULAR; INTRAVENOUS
Status: DISCONTINUED | OUTPATIENT
Start: 2021-04-06 | End: 2021-04-06 | Stop reason: HOSPADM

## 2021-04-06 RX ORDER — INDOCYANINE GREEN AND WATER 25 MG
KIT INJECTION AS NEEDED
Status: DISCONTINUED | OUTPATIENT
Start: 2021-04-06 | End: 2021-04-06 | Stop reason: SURG

## 2021-04-06 RX ORDER — MAGNESIUM SULFATE HEPTAHYDRATE 500 MG/ML
INJECTION, SOLUTION INTRAMUSCULAR; INTRAVENOUS AS NEEDED
Status: DISCONTINUED | OUTPATIENT
Start: 2021-04-06 | End: 2021-04-06 | Stop reason: SURG

## 2021-04-06 RX ORDER — ALVIMOPAN 12 MG/1
12 CAPSULE ORAL ONCE
Status: COMPLETED | OUTPATIENT
Start: 2021-04-06 | End: 2021-04-06

## 2021-04-06 RX ORDER — ACETAMINOPHEN 500 MG
1000 TABLET ORAL EVERY 6 HOURS
Status: DISCONTINUED | OUTPATIENT
Start: 2021-04-06 | End: 2021-04-12 | Stop reason: HOSPADM

## 2021-04-06 RX ORDER — BUPIVACAINE HYDROCHLORIDE 5 MG/ML
INJECTION, SOLUTION EPIDURAL; INTRACAUDAL
Status: COMPLETED | OUTPATIENT
Start: 2021-04-06 | End: 2021-04-06

## 2021-04-06 RX ORDER — SODIUM CHLORIDE 0.9 % (FLUSH) 0.9 %
10 SYRINGE (ML) INJECTION EVERY 12 HOURS SCHEDULED
Status: DISCONTINUED | OUTPATIENT
Start: 2021-04-06 | End: 2021-04-06 | Stop reason: HOSPADM

## 2021-04-06 RX ORDER — ONDANSETRON 2 MG/ML
4 INJECTION INTRAMUSCULAR; INTRAVENOUS EVERY 6 HOURS PRN
Status: DISCONTINUED | OUTPATIENT
Start: 2021-04-06 | End: 2021-04-12 | Stop reason: HOSPADM

## 2021-04-06 RX ORDER — LIDOCAINE HYDROCHLORIDE 20 MG/ML
INJECTION, SOLUTION INFILTRATION; PERINEURAL AS NEEDED
Status: DISCONTINUED | OUTPATIENT
Start: 2021-04-06 | End: 2021-04-06 | Stop reason: SURG

## 2021-04-06 RX ORDER — ONDANSETRON 2 MG/ML
INJECTION INTRAMUSCULAR; INTRAVENOUS AS NEEDED
Status: DISCONTINUED | OUTPATIENT
Start: 2021-04-06 | End: 2021-04-06 | Stop reason: SURG

## 2021-04-06 RX ORDER — ALBUMIN, HUMAN INJ 5% 5 %
SOLUTION INTRAVENOUS CONTINUOUS PRN
Status: DISCONTINUED | OUTPATIENT
Start: 2021-04-06 | End: 2021-04-06 | Stop reason: SURG

## 2021-04-06 RX ORDER — KETAMINE HYDROCHLORIDE 10 MG/ML
INJECTION INTRAMUSCULAR; INTRAVENOUS AS NEEDED
Status: DISCONTINUED | OUTPATIENT
Start: 2021-04-06 | End: 2021-04-06 | Stop reason: SURG

## 2021-04-06 RX ORDER — METOPROLOL SUCCINATE 25 MG/1
25 TABLET, EXTENDED RELEASE ORAL DAILY
Status: DISCONTINUED | OUTPATIENT
Start: 2021-04-07 | End: 2021-04-12 | Stop reason: HOSPADM

## 2021-04-06 RX ORDER — ALVIMOPAN 12 MG/1
12 CAPSULE ORAL 2 TIMES DAILY
Status: DISCONTINUED | OUTPATIENT
Start: 2021-04-07 | End: 2021-04-12 | Stop reason: HOSPADM

## 2021-04-06 RX ORDER — MIDAZOLAM HYDROCHLORIDE 1 MG/ML
0.5 INJECTION INTRAMUSCULAR; INTRAVENOUS
Status: DISCONTINUED | OUTPATIENT
Start: 2021-04-06 | End: 2021-04-06 | Stop reason: HOSPADM

## 2021-04-06 RX ADMIN — SODIUM CHLORIDE, POTASSIUM CHLORIDE, SODIUM LACTATE AND CALCIUM CHLORIDE: 600; 310; 30; 20 INJECTION, SOLUTION INTRAVENOUS at 07:23

## 2021-04-06 RX ADMIN — BUPIVACAINE 10 ML: 13.3 INJECTION, SUSPENSION, LIPOSOMAL INFILTRATION at 08:02

## 2021-04-06 RX ADMIN — INDOCYANINE GREEN AND WATER 7.5 MG: KIT at 09:36

## 2021-04-06 RX ADMIN — PHENYLEPHRINE HYDROCHLORIDE 100 MCG: 10 INJECTION INTRAVENOUS at 07:47

## 2021-04-06 RX ADMIN — DEXAMETHASONE SODIUM PHOSPHATE 8 MG: 10 INJECTION INTRAMUSCULAR; INTRAVENOUS at 08:09

## 2021-04-06 RX ADMIN — EPHEDRINE SULFATE 10 MG: 50 INJECTION INTRAVENOUS at 08:22

## 2021-04-06 RX ADMIN — ROCURONIUM BROMIDE 10 MG: 50 INJECTION INTRAVENOUS at 10:56

## 2021-04-06 RX ADMIN — EPHEDRINE SULFATE 5 MG: 50 INJECTION INTRAVENOUS at 08:10

## 2021-04-06 RX ADMIN — CEFAZOLIN SODIUM 2 G: 2 INJECTION, SOLUTION INTRAVENOUS at 07:20

## 2021-04-06 RX ADMIN — INDOCYANINE GREEN AND WATER 7.5 MG: KIT at 09:28

## 2021-04-06 RX ADMIN — ONDANSETRON 4 MG: 2 INJECTION INTRAMUSCULAR; INTRAVENOUS at 15:12

## 2021-04-06 RX ADMIN — BUPIVACAINE HYDROCHLORIDE 10 ML: 5 INJECTION, SOLUTION EPIDURAL; INTRACAUDAL; PERINEURAL at 08:02

## 2021-04-06 RX ADMIN — KETAMINE HYDROCHLORIDE 10 MG: 10 INJECTION INTRAMUSCULAR; INTRAVENOUS at 13:05

## 2021-04-06 RX ADMIN — EPHEDRINE SULFATE 10 MG: 50 INJECTION INTRAVENOUS at 08:14

## 2021-04-06 RX ADMIN — PHENYLEPHRINE HYDROCHLORIDE 0.3 MCG/KG/MIN: 10 INJECTION, SOLUTION INTRAMUSCULAR; INTRAVENOUS; SUBCUTANEOUS at 08:37

## 2021-04-06 RX ADMIN — ROCURONIUM BROMIDE 10 MG: 50 INJECTION INTRAVENOUS at 10:06

## 2021-04-06 RX ADMIN — EPHEDRINE SULFATE 5 MG: 50 INJECTION INTRAVENOUS at 08:08

## 2021-04-06 RX ADMIN — EPHEDRINE SULFATE 10 MG: 50 INJECTION INTRAVENOUS at 08:16

## 2021-04-06 RX ADMIN — ROCURONIUM BROMIDE 10 MG: 50 INJECTION INTRAVENOUS at 12:06

## 2021-04-06 RX ADMIN — ROCURONIUM BROMIDE 10 MG: 50 INJECTION INTRAVENOUS at 08:24

## 2021-04-06 RX ADMIN — METRONIDAZOLE 500 MG: 500 INJECTION, SOLUTION INTRAVENOUS at 07:20

## 2021-04-06 RX ADMIN — HYDROMORPHONE HYDROCHLORIDE 0.25 MG: 1 INJECTION, SOLUTION INTRAMUSCULAR; INTRAVENOUS; SUBCUTANEOUS at 22:56

## 2021-04-06 RX ADMIN — FAMOTIDINE 20 MG: 10 INJECTION INTRAVENOUS at 06:42

## 2021-04-06 RX ADMIN — PHENYLEPHRINE HYDROCHLORIDE 0.5 MCG/KG/MIN: 10 INJECTION INTRAVENOUS at 15:30

## 2021-04-06 RX ADMIN — NEOSTIGMINE METHYLSULFATE 5 MG: 0.5 INJECTION INTRAVENOUS at 14:52

## 2021-04-06 RX ADMIN — EPHEDRINE SULFATE 5 MG: 50 INJECTION INTRAVENOUS at 07:50

## 2021-04-06 RX ADMIN — MAGNESIUM SULFATE HEPTAHYDRATE 2 G: 500 INJECTION, SOLUTION INTRAMUSCULAR; INTRAVENOUS at 07:46

## 2021-04-06 RX ADMIN — ALBUMIN HUMAN 500 ML: 0.05 INJECTION, SOLUTION INTRAVENOUS at 15:37

## 2021-04-06 RX ADMIN — GABAPENTIN 600 MG: 300 CAPSULE ORAL at 06:36

## 2021-04-06 RX ADMIN — GABAPENTIN 300 MG: 300 CAPSULE ORAL at 20:32

## 2021-04-06 RX ADMIN — CELECOXIB 200 MG: 200 CAPSULE ORAL at 20:32

## 2021-04-06 RX ADMIN — LIDOCAINE HYDROCHLORIDE 100 MG: 20 INJECTION, SOLUTION INFILTRATION; PERINEURAL at 07:30

## 2021-04-06 RX ADMIN — PHENYLEPHRINE HYDROCHLORIDE 100 MCG: 10 INJECTION INTRAVENOUS at 07:44

## 2021-04-06 RX ADMIN — KETAMINE HYDROCHLORIDE 10 MG: 10 INJECTION INTRAMUSCULAR; INTRAVENOUS at 09:06

## 2021-04-06 RX ADMIN — ACETAMINOPHEN 1000 MG: 500 TABLET, FILM COATED ORAL at 20:32

## 2021-04-06 RX ADMIN — CEFAZOLIN SODIUM 2 G: 2 INJECTION, SOLUTION INTRAVENOUS at 11:40

## 2021-04-06 RX ADMIN — ROCURONIUM BROMIDE 50 MG: 50 INJECTION INTRAVENOUS at 07:31

## 2021-04-06 RX ADMIN — GLYCOPYRROLATE 0.8 MG: 0.2 INJECTION INTRAMUSCULAR; INTRAVENOUS at 14:52

## 2021-04-06 RX ADMIN — FENTANYL CITRATE 25 MCG: 50 INJECTION INTRAMUSCULAR; INTRAVENOUS at 14:32

## 2021-04-06 RX ADMIN — FENTANYL CITRATE 25 MCG: 50 INJECTION INTRAMUSCULAR; INTRAVENOUS at 15:11

## 2021-04-06 RX ADMIN — KETAMINE HYDROCHLORIDE 50 MG: 10 INJECTION INTRAMUSCULAR; INTRAVENOUS at 08:05

## 2021-04-06 RX ADMIN — FENTANYL CITRATE 25 MCG: 50 INJECTION INTRAMUSCULAR; INTRAVENOUS at 07:29

## 2021-04-06 RX ADMIN — SODIUM CHLORIDE, POTASSIUM CHLORIDE, SODIUM LACTATE AND CALCIUM CHLORIDE 50 ML/HR: 600; 310; 30; 20 INJECTION, SOLUTION INTRAVENOUS at 20:10

## 2021-04-06 RX ADMIN — ALBUMIN HUMAN: 0.05 INJECTION, SOLUTION INTRAVENOUS at 15:19

## 2021-04-06 RX ADMIN — ACETAMINOPHEN 1000 MG: 500 TABLET, FILM COATED ORAL at 06:37

## 2021-04-06 RX ADMIN — KETAMINE HYDROCHLORIDE 10 MG: 10 INJECTION INTRAMUSCULAR; INTRAVENOUS at 10:12

## 2021-04-06 RX ADMIN — PROPOFOL 150 MG: 10 INJECTION, EMULSION INTRAVENOUS at 07:30

## 2021-04-06 RX ADMIN — KETAMINE HYDROCHLORIDE 10 MG: 10 INJECTION INTRAMUSCULAR; INTRAVENOUS at 11:10

## 2021-04-06 RX ADMIN — ACETAMINOPHEN 1000 MG: 10 INJECTION, SOLUTION INTRAVENOUS at 15:37

## 2021-04-06 RX ADMIN — EPHEDRINE SULFATE 5 MG: 50 INJECTION INTRAVENOUS at 08:06

## 2021-04-06 RX ADMIN — ALVIMOPAN 12 MG: 12 CAPSULE ORAL at 06:37

## 2021-04-06 RX ADMIN — LIDOCAINE HYDROCHLORIDE 2 MG/MIN: 4 INJECTION, SOLUTION INTRAVENOUS at 07:58

## 2021-04-06 RX ADMIN — CELECOXIB 200 MG: 200 CAPSULE ORAL at 06:37

## 2021-04-06 RX ADMIN — KETAMINE HYDROCHLORIDE 10 MG: 10 INJECTION INTRAMUSCULAR; INTRAVENOUS at 12:04

## 2021-04-06 NOTE — ANESTHESIA POSTPROCEDURE EVALUATION
Patient: Darwin Sanchez    Procedure Summary     Date: 04/06/21 Room / Location: [facility] OR  / [facility] MAIN OR    Anesthesia Start: 0723 Anesthesia Stop: 1521    Procedures:       ABDOMINAL PERINEAL RESECTION LAPAROSCOPIC WITH DAVINCI ROBOT (N/A Abdomen)      BILATERAL GLUTEAL ADVANCEMENT FLAP AND GRACILIS MUSCLE FLAP (Bilateral Breast) Diagnosis:       Anal cancer (CMS/HCC)      (Anal cancer (CMS/HCC) [C21.0])    Surgeons: Makeda Ocasio MD; Mark Bardales MD Provider: Mason Rene MD    Anesthesia Type: general with block ASA Status: 3          Anesthesia Type: general with block    Vitals  Vitals Value Taken Time   /90 04/06/21 1815   Temp 36.7 °C (98 °F) 04/06/21 1515   Pulse 67 04/06/21 1820   Resp 16 04/06/21 1745   SpO2 97 % 04/06/21 1820   Vitals shown include unvalidated device data.        Post Anesthesia Care and Evaluation    Patient location during evaluation: bedside  Patient participation: complete - patient participated  Level of consciousness: awake  Pain management: adequate  Airway patency: patent  Anesthetic complications: No anesthetic complications  PONV Status: none  Cardiovascular status: acceptable  Respiratory status: acceptable  Hydration status: acceptable  Post Neuraxial Block status: Motor and sensory function returned to baseline

## 2021-04-06 NOTE — OP NOTE
Pre-Operative Diagnosis:  1. Perianal scc  2. Perineal defect s/p excision of perianal scc    Post-Operative Diagnosis: Same    Procedure Performed:   1. Bilateral gluteal fasciocutaneous V-Y advancement flaps 150 sq cm each  2. Left gracilis muscle flap to pelvic floor    Surgeon: RADHA Bardales MD    Assistant: None    Anesthesia: General    Estimated Blood Loss: 200    Specimens: None    Complications: Levator musculature was involved with radiation injury and inadequate for support of the pelvic floor, gracilis muscle flap was used to reinforce the pelvic floor closure.    Indications: He presented from Dr. 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  She had planned excision of the SCC and end colostomy.  Given the large anticipated defect we discussed gluteal advancement flaps to get soft tissue closure.    We discussed risks, benefits and alternatives including but not limited to: bleeding, infection, asymmetry, poor or slow wound healing, need for further surgery, possible recurrence.  We discussed that given the large defect he would almost certainly have some areas of breakdown requiring additional wound care. The patient elected to proceed.    Description of Procedure: The patient was met in the preoperative holding area.  All questions were answered and informed consent was assured.      After induction of appropriate anesthesia, a timeout was performed correctly identifying the patient, operative site, and procedure to be performed.  All present were in agreement.    The defect was examined and measured 15x12 cm.  Pelvic floor closure was performed but due to the radiation the levator musculature was inadequate to ensure stable support of the pelvic floor and needed additional muscle support. Gracilis muscle was elected for this.  The donor site was incised over the marked  10cm from the ischium and 2 fingerbreadths below the palpable adductor.  The dissection was carried  through the subcutaneous tissue and the gracilis muscle was encountered.  It was dissected from the surrounding tissue and the medial border of the abductor was identified as well and this septum was entered and careful dissection was taken down to the space under the abductor and the  was identified.  Blunt and sharp dissection was used to identify the  and the nerve to the gracilis was clipped and divided.  The distal end of the muscle was dissected free and a 3 cm incision was made at the medial aspect of the knee over the palpable tendinous portion of the muscle.  The tendon was dissected circumferentially and placed under tension to confirm that this was the correct structure.  It was divided and the muscle was able to be passed into the proximal thigh site.  It was divided from the pelvis and freed up along the .  The  was carefully dissected down to its origin to gain maximum excursion.  A tunnel was developed into the perineum and the flap turned to 180 degrees and passed through the tunnel.  There was adequate excursion in the distal portion of the muscle passed into the defect.  2-0 Vicryl sutures were used to fix the muscle on top of the levator closure.  The donor site was copiously irrigated and immaculate hemostasis achieved a drain was placed and closure was performed with 2-0 Vicryl in the subcutaneous space and running 3-0 chromic in the skin.    With the defect was measured to be 12 cm and a 12 cm tall V-Y flap was designed on either side of the defect.  This was then carried down to the superficial fascia which was divided and muscle fascia was divided segmentally.  The flap was able to be advanced to the midline in the left lateral flap was able to jump down into the defect to obliterate the dead space and was de-epithelialized.  The right flap was then transposed past the midline to cover the left flap.  Drains placed and closure was performed with 2-0  Vicryl in the deep space between the 2 flaps obliterating the dead space and closing the donor sites and then interrupted vertical mattress 3-0 Chromic Gut throughout the rest of the skin incisions.  Incisions were dressed with bacitracin and dry dressing.    The patient was then aroused from anesthesia with ease and transferred to the postoperative care area in good condition. All sponge, needle, and instrument counts were correct.

## 2021-04-06 NOTE — PLAN OF CARE
Problem: Adult Inpatient Plan of Care  Goal: Plan of Care Review  Flowsheets (Taken 4/6/2021 4487)  Progress: no change  Plan of Care Reviewed With: patient  Outcome Summary: Transferred to , VSS on 2LNC, resting in bed, will continue to monitor.

## 2021-04-06 NOTE — OP NOTE
04/06/21    Surgeon: Makeda Ocasio MD    Surgical  Assistant: Ghulam Mariano CSA     Preoperative diagnosis: Anal cancer (CMS/HCC) [C21.0]    Post-Op Diagnosis Codes:     * Anal cancer (CMS/HCC) [C21.0]    Procedure: ABDOMINAL PERINEAL RESECTION LAPAROSCOPIC WITH DAVINCI ROBOT,  Estimated Blood Loss: 200 mL    Specimens:   Specimens     ID Source Type Tests Collected By Collected At Frozen?    A Large Intestine, Sigmoid Colon Tissue · TISSUE PATHOLOGY EXAM   Makeda Ocasio MD 4/6/21 1010 Yes    Description: rectum, sigmoid colon, and anus    B Large Intestine, Sigmoid Colon Tissue · TISSUE PATHOLOGY EXAM   Makeda Ocasio MD 4/6/21 1437 No    Description: proximal sigmoid colon         Order Name Source Comment Collection Info Order Time   TISSUE PATHOLOGY EXAM Large Intestine, Sigmoid Colon  Collected By: Makeda Ocasio MD 4/6/2021 10:11 AM     Release to patient   Immediate            Indication:  Darwin Sanchez is a 75 y.o. male who comes in with Anal cancer (CMS/HCC)  Patient understands risks, benefits,and alternatives wishes to proceed.      Procedure Details:  The patient was brought to the operating room, SCD's in place, antibiotics infused and TAP block was done. The patient was placed in Yellofins lithotomy. He was then prepped and draped in sterile fashion. Marcaine 0.25% with epinephrine was used as a local infiltration at the trocar sites. First I made an incision right below the ribs, midclavicular line on the left side. I put the Veress needle into the abdomen and started to inflate. There was a little bit of resistance so I went to the periumbilical and insufflated the abdomen up to 15 mmHg. Where the ostomy site was going to be in the left lower quadrant I placed an 8 mm trocar and then two other 8's in the right lower quadrant and exchanged out the ostomy site one for a 12 and then another 8 in the left lower quadrant and assist port in the left mid quadrant. I then put the patient  in Trendelenburg and tilted to the right. I docked the robot and placed all of the instruments in the abdomen under direct visualization. First I got into the retrorectal space, the patient had a few adhesions of the sigmoid to the left lateral pelvic sidewall. I used sharp dissection to get into the retrorectal space and dissected all the way down to the levators. I then traded out for a vessel sealer and started taking the lateral stalks down to the level of the levators on the right and left side, anteriorly I used scissors to get into the space between the prostate and the rectum and dissected down to the mid prostate. I then exchanged out for the scissors again and started dissecting the left colon along the white line of Toldt in order to get enough length to bring the colostomy up. I got all of the adhesions of the sigmoid colon down, looked at the sigmoid colon, changed out for the vessel sealer again and took the GINO. I then took the mesentery to the level of the mid sigmoid and gave ICG and where I had taken the mesentery off it appeared that there was a demarcation of blood supply, so I then hopped ports and put a blue load stapler in the 12 and used that to staple across the sigmoid colon. I had the assist place a stitch through the abdominal wall just lateral to the ostomy site and then I brought it through the staple line and then back through the abdominal wall to fredy the end of the sigmoid. I then went below and first I marked out 2 cm around all of the cancer and dysplastic sites, and then used a knife to go around that and carried the incision down through the skin to the subcutaneous fat, taking some of the subcutaneous fat off and using some electrocautery but mainly sharp dissection to take the tumor off of the subcutaneous fat. Then once I got all of that mobilized I was finally at the anal canal and did the typical dissection to remove the anal canal. I got in and met my dissection  posteriorly and then used the Enseal to finish the dissection circumferentially. I took the specimen out and off the field, and irrigated to make sure there was good hemostasis and I walked the specimen down to Pathology to ensure that there were negative margins and to orient the specimen with the pathologist. The pathologist called in a little bit later and Dr. Weems had said that the margins were fine, all right, left lateral and the anterior margin was the potential questionable one and she called in soon after my return and said it was negative of any cancer. I had scrubbed back in and irrigated out the pelvis and then started to close the levators. They were quite stenotic and did not have a lot of plates with them. I was able to get the levators closed but there was some tension to it so we decided that doing a gracilis flap to reinforce that would be the right thing to do. He will dictate his portion. I did close the levators with 0 Vicryl in interrupted fashion and made sure there was good hemostasis before I handed the case over to Dr. Bardales. Once he was done with his closure I took back over the case in order to make sure the colotomy in the premarked area in the left lateral side, I made an elliptical incision around the trocar and carried it down to the fascia and incised the fascia with the rectus muscle out of the way and incised the peritoneum and brought the end of the colon up through the colostomy site. There was about 3 cm that were slightly dusky so I made an incision through the colon wall at nice, pink tissue and sent that off as proximal sigmoid. I then matured the colostomy with 3-0 Vicryl. I closed the skin incision before maturing the colostomy, with 4-0 Monocryl. All instrument, lap counts, needle counts were correct. The patient was stable throughout the entire case and was taken to recovery.

## 2021-04-06 NOTE — ANESTHESIA PROCEDURE NOTES
Peripheral Block      Patient location during procedure: pre-op  Reason for block: at surgeon's request and post-op pain management  Performed by  Anesthesiologist: Mason Rene MD  Preanesthetic Checklist  Completed: patient identified, IV checked, site marked, risks and benefits discussed, surgical consent, monitors and equipment checked, pre-op evaluation and timeout performed  Prep:  Pt Position: supine  Sterile barriers:cap, gloves and mask  Prep: ChloraPrep  Patient monitoring: blood pressure monitoring, continuous pulse oximetry and EKG  Procedure  Sedation:yes  Performed under: local infiltration  Guidance:ultrasound guided  ULTRASOUND INTERPRETATION. Using ultrasound guidance a 22 G gauge needle was placed in close proximity to the nerve, at which point, under ultrasound guidance anesthetic was injected in the area of the nerve and spread of the anesthesia was seen on ultrasound in close proximity thereto.  There were no abnormalities seen on ultrasound; a digital image was taken; and the patient tolerated the procedure with no complications. Images:still images obtained    Laterality:Bilateral  Block Type:TAP  Injection Technique:single-shot  Needle Type:echogenic  Needle Gauge:22 G      Medications Used: bupivacaine (PF) (MARCAINE) 0.5 % injection, 10 mL  bupivacaine liposome (EXPAREL) 1.3 % injection, 10 mL  Med admintered at 4/6/2021 8:02 AM      Post Assessment  Injection Assessment: negative aspiration for heme, no paresthesia on injection and incremental injection  Patient Tolerance:comfortable throughout block  Complications:no

## 2021-04-06 NOTE — ANESTHESIA PREPROCEDURE EVALUATION
Anesthesia Evaluation     Patient summary reviewed and Nursing notes reviewed   no history of anesthetic complications:  NPO Solid Status: > 6 hours  NPO Liquid Status: > 6 hours           Airway   Mallampati: II  TM distance: >3 FB  Neck ROM: full  no difficulty expected and No difficulty expected  Dental - normal exam     Pulmonary - normal exam    breath sounds clear to auscultation  (+) shortness of breath,   (-) rhonchi, decreased breath sounds, wheezes, rales, stridor  Cardiovascular - normal exam    NYHA Classification: I  ECG reviewed  Rhythm: regular  Rate: normal    (+) hypertension, CAD, PVD, hyperlipidemia,  carotid artery disease  (-) angina, murmur, weak pulses, friction rub, systolic click, carotid bruits, JVD, peripheral edema      Neuro/Psych  (+) psychiatric history Anxiety,     GI/Hepatic/Renal/Endo    (+) obesity,  GI bleeding ,     Musculoskeletal     Abdominal  - normal exam    Abdomen: soft.   Substance History - negative use     OB/GYN negative ob/gyn ROS         Other   arthritis,    history of cancer active                    Anesthesia Plan    ASA 3     general with block     intravenous induction     Anesthetic plan, all risks, benefits, and alternatives have been provided, discussed and informed consent has been obtained with: patient.

## 2021-04-06 NOTE — ANESTHESIA PROCEDURE NOTES
Airway  Urgency: elective    Date/Time: 4/6/2021 7:34 AM    General Information and Staff    Patient location during procedure: OR  Anesthesiologist: Mason Rene MD  CRNA: Navi Gallardo CRNA    Indications and Patient Condition  Indications for airway management: airway protection    Preoxygenated: yes  MILS not maintained throughout  Mask difficulty assessment: 2 - vent by mask + OA or adjuvant +/- NMBA    Final Airway Details  Final airway type: endotracheal airway      Successful airway: ETT  Cuffed: yes   Successful intubation technique: direct laryngoscopy  Facilitating devices/methods: cricoid pressure  Endotracheal tube insertion site: oral  Blade: Dede  Blade size: 4  ETT size (mm): 8.0  Cormack-Lehane Classification: grade IIb - view of arytenoids or posterior of glottis only  Placement verified by: chest auscultation   Cuff volume (mL): 9  Measured from: lips  ETT/EBT  to lips (cm): 23  Number of attempts at approach: 1  Assessment: lips, teeth, and gum same as pre-op and atraumatic intubation

## 2021-04-06 NOTE — H&P
Allergies  HYDROCODONE   Past Medical History  Arthritis: Y  Cancer: Y  Hearing Loss: Y  High Blood Pressure: Y  Joint Pain/Stiffness/Swelling: Y  Wear Glasses/Contact Lenses: Y  Surgical History  Cancer Surgery  Joint Replacement  Knee Surgery  Family History  Mother - Obesity    - Arthritis  Father - Arthritis    - Obesity    - Hypertensive disorder    - Malignant tumor of prostate  Brother - Arthritis    - Hypertensive disorder    - Obesity  Sister - Obesity    - Arthritis  Social History  Tobacco Smoking Status: Never smoker  Vitals  None recorded.  HPI  76 yo gentleman sent in referral from Dr. Ocasio for perineal reconstruction after resection of recurrent anal cancer. 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 He reports he has always had an area that had poor healing and some ulceration but in the last couple of years started to have more drainage. Recently was seen by Dr. Ocasio who identified a large ulcerated area and biopsy identified recurrence of the SCC. He has had some significant bleeding from the site. She has discussed a wide local excision of this with an end colostomy. Given the large area involved he will need an adjacent tissue repair.  ROS  Patient reports black or tarry stools but reports no abdominal pain and normal appetite. He reports growths/lesions, non-healing areas, and change in skin color. He reports no fever, no night sweats, no significant weight gain, and no significant weight loss. He reports no dry eyes, no vision change, and no irritation. He reports no difficulty hearing and no ear pain. He reports no sore throat and no bleeding gums. He reports no chest pain and no shortness of breath when lying down. He reports no cough, no wheezing, and no shortness of breath. He reports no incontinence and no hematuria. He reports no muscle aches, no muscle weakness, and no back pain.  Physical Exam  Patient is a 75-year-old male.    Chaperone: Chaperone:  present.    Constitutional: General Appearance: healthy-appearing, well-nourished, and well-developed. Level of Distress: NAD.    Psychiatric: Mental Status: normal mood and affect and active and alert.    Head: Head: normocephalic and atraumatic.    Eyes: Lids and Conjunctivae: no discharge or pallor and non-injected.    Neck: Neck: supple and trachea midline.    Lungs: Respiratory effort: no dyspnea. Auscultation: good air movement and no wheezing.    Cardiovascular: Heart Auscultation: RRR.    Abdomen: Inspection and Palpation: soft, non-distended, and no tenderness.    Rectal: Anus, Perineum, Rectum: right anal verge with large ulceration, 4x6cm, entirety of the perianal skin with irritation from radiation extending into perineum and base of scrotum.  Assessment / Plan  Discussed the challenges of his reconstruction and the need for large gluteal advancement flaps to get closure after removal of the SCC. Bilateral flaps will be needed with a rotation on one side and advancement on the other to layer the flaps and obliterate dead space. Discussed that the previous radiation increases his risk of would healing problems and will likely have at least some area that does not heal perfectly and will need extra wound care. He was given the asps consent form and my post op protocol.    Visit was performed with all precautions regarding patient and provider protections during the Covid 19 pandemic. Temperature was taken and questionnaire completed regarding symptoms and contacts.

## 2021-04-07 LAB
ANION GAP SERPL CALCULATED.3IONS-SCNC: 7.1 MMOL/L (ref 5–15)
APTT PPP: 30 SECONDS (ref 22.7–35.4)
BASOPHILS # BLD AUTO: 0.02 10*3/MM3 (ref 0–0.2)
BASOPHILS NFR BLD AUTO: 0.2 % (ref 0–1.5)
BUN SERPL-MCNC: 27 MG/DL (ref 8–23)
BUN/CREAT SERPL: 20.1 (ref 7–25)
CALCIUM SPEC-SCNC: 7.4 MG/DL (ref 8.6–10.5)
CHLORIDE SERPL-SCNC: 105 MMOL/L (ref 98–107)
CO2 SERPL-SCNC: 26.9 MMOL/L (ref 22–29)
CREAT SERPL-MCNC: 1.34 MG/DL (ref 0.76–1.27)
DEPRECATED RDW RBC AUTO: 40.8 FL (ref 37–54)
EOSINOPHIL # BLD AUTO: 0 10*3/MM3 (ref 0–0.4)
EOSINOPHIL NFR BLD AUTO: 0 % (ref 0.3–6.2)
ERYTHROCYTE [DISTWIDTH] IN BLOOD BY AUTOMATED COUNT: 12.8 % (ref 12.3–15.4)
GFR SERPL CREATININE-BSD FRML MDRD: 52 ML/MIN/1.73
GLUCOSE SERPL-MCNC: 105 MG/DL (ref 65–99)
HCT VFR BLD AUTO: 30.8 % (ref 37.5–51)
HGB BLD-MCNC: 10.1 G/DL (ref 13–17.7)
IMM GRANULOCYTES # BLD AUTO: 0.06 10*3/MM3 (ref 0–0.05)
IMM GRANULOCYTES NFR BLD AUTO: 0.6 % (ref 0–0.5)
INR PPP: 1.15 (ref 0.9–1.1)
LAB AP CASE REPORT: NORMAL
LAB AP SYNOPTIC CHECKLIST: NORMAL
LYMPHOCYTES # BLD AUTO: 0.97 10*3/MM3 (ref 0.7–3.1)
LYMPHOCYTES NFR BLD AUTO: 9.9 % (ref 19.6–45.3)
Lab: NORMAL
MAGNESIUM SERPL-MCNC: 2.2 MG/DL (ref 1.6–2.4)
MCH RBC QN AUTO: 29 PG (ref 26.6–33)
MCHC RBC AUTO-ENTMCNC: 32.8 G/DL (ref 31.5–35.7)
MCV RBC AUTO: 88.5 FL (ref 79–97)
MONOCYTES # BLD AUTO: 0.8 10*3/MM3 (ref 0.1–0.9)
MONOCYTES NFR BLD AUTO: 8.2 % (ref 5–12)
NEUTROPHILS NFR BLD AUTO: 7.91 10*3/MM3 (ref 1.7–7)
NEUTROPHILS NFR BLD AUTO: 81.1 % (ref 42.7–76)
NRBC BLD AUTO-RTO: 0 /100 WBC (ref 0–0.2)
PATH REPORT.FINAL DX SPEC: NORMAL
PATH REPORT.GROSS SPEC: NORMAL
PLATELET # BLD AUTO: 136 10*3/MM3 (ref 140–450)
PMV BLD AUTO: 11.2 FL (ref 6–12)
POTASSIUM SERPL-SCNC: 3.8 MMOL/L (ref 3.5–5.2)
PROTHROMBIN TIME: 14.5 SECONDS (ref 11.7–14.2)
RBC # BLD AUTO: 3.48 10*6/MM3 (ref 4.14–5.8)
SODIUM SERPL-SCNC: 139 MMOL/L (ref 136–145)
WBC # BLD AUTO: 9.76 10*3/MM3 (ref 3.4–10.8)

## 2021-04-07 PROCEDURE — 25010000002 HYDROMORPHONE PER 4 MG: Performed by: COLON & RECTAL SURGERY

## 2021-04-07 PROCEDURE — 97530 THERAPEUTIC ACTIVITIES: CPT

## 2021-04-07 PROCEDURE — 85025 COMPLETE CBC W/AUTO DIFF WBC: CPT | Performed by: COLON & RECTAL SURGERY

## 2021-04-07 PROCEDURE — 85730 THROMBOPLASTIN TIME PARTIAL: CPT | Performed by: COLON & RECTAL SURGERY

## 2021-04-07 PROCEDURE — 97162 PT EVAL MOD COMPLEX 30 MIN: CPT

## 2021-04-07 PROCEDURE — 80048 BASIC METABOLIC PNL TOTAL CA: CPT | Performed by: COLON & RECTAL SURGERY

## 2021-04-07 PROCEDURE — 85610 PROTHROMBIN TIME: CPT | Performed by: COLON & RECTAL SURGERY

## 2021-04-07 PROCEDURE — 25010000002 ENOXAPARIN PER 10 MG: Performed by: COLON & RECTAL SURGERY

## 2021-04-07 PROCEDURE — 25010000003 CEFAZOLIN IN DEXTROSE 2-4 GM/100ML-% SOLUTION: Performed by: COLON & RECTAL SURGERY

## 2021-04-07 PROCEDURE — 83735 ASSAY OF MAGNESIUM: CPT | Performed by: COLON & RECTAL SURGERY

## 2021-04-07 RX ORDER — CEFAZOLIN SODIUM 1 G/3ML
1 INJECTION, POWDER, FOR SOLUTION INTRAMUSCULAR; INTRAVENOUS EVERY 8 HOURS
Status: DISCONTINUED | OUTPATIENT
Start: 2021-04-07 | End: 2021-04-07

## 2021-04-07 RX ORDER — CEFAZOLIN SODIUM 1 G/50ML
1 INJECTION, SOLUTION INTRAVENOUS EVERY 8 HOURS
Status: DISCONTINUED | OUTPATIENT
Start: 2021-04-07 | End: 2021-04-07

## 2021-04-07 RX ORDER — CEFAZOLIN SODIUM 2 G/100ML
2 INJECTION, SOLUTION INTRAVENOUS EVERY 8 HOURS
Status: COMPLETED | OUTPATIENT
Start: 2021-04-07 | End: 2021-04-09

## 2021-04-07 RX ADMIN — CELECOXIB 200 MG: 200 CAPSULE ORAL at 09:43

## 2021-04-07 RX ADMIN — METOPROLOL SUCCINATE 25 MG: 25 TABLET, EXTENDED RELEASE ORAL at 09:43

## 2021-04-07 RX ADMIN — HYDROMORPHONE HYDROCHLORIDE 0.25 MG: 1 INJECTION, SOLUTION INTRAMUSCULAR; INTRAVENOUS; SUBCUTANEOUS at 14:00

## 2021-04-07 RX ADMIN — BACITRACIN: 500 OINTMENT TOPICAL at 09:45

## 2021-04-07 RX ADMIN — ACETAMINOPHEN 1000 MG: 500 TABLET, FILM COATED ORAL at 20:22

## 2021-04-07 RX ADMIN — HYDROMORPHONE HYDROCHLORIDE 0.25 MG: 1 INJECTION, SOLUTION INTRAMUSCULAR; INTRAVENOUS; SUBCUTANEOUS at 17:33

## 2021-04-07 RX ADMIN — ACETAMINOPHEN 1000 MG: 500 TABLET, FILM COATED ORAL at 03:44

## 2021-04-07 RX ADMIN — ACETAMINOPHEN 1000 MG: 500 TABLET, FILM COATED ORAL at 15:39

## 2021-04-07 RX ADMIN — SODIUM CHLORIDE, POTASSIUM CHLORIDE, SODIUM LACTATE AND CALCIUM CHLORIDE 50 ML/HR: 600; 310; 30; 20 INJECTION, SOLUTION INTRAVENOUS at 12:15

## 2021-04-07 RX ADMIN — CEFAZOLIN SODIUM 2 G: 2 INJECTION, SOLUTION INTRAVENOUS at 22:20

## 2021-04-07 RX ADMIN — GABAPENTIN 300 MG: 300 CAPSULE ORAL at 20:22

## 2021-04-07 RX ADMIN — HYDROMORPHONE HYDROCHLORIDE 0.25 MG: 1 INJECTION, SOLUTION INTRAMUSCULAR; INTRAVENOUS; SUBCUTANEOUS at 07:26

## 2021-04-07 RX ADMIN — CEFAZOLIN SODIUM 2 G: 2 INJECTION, SOLUTION INTRAVENOUS at 15:39

## 2021-04-07 RX ADMIN — ALVIMOPAN 12 MG: 12 CAPSULE ORAL at 09:43

## 2021-04-07 RX ADMIN — ENOXAPARIN SODIUM 40 MG: 40 INJECTION SUBCUTANEOUS at 09:43

## 2021-04-07 RX ADMIN — ACETAMINOPHEN 1000 MG: 500 TABLET, FILM COATED ORAL at 09:43

## 2021-04-07 RX ADMIN — GABAPENTIN 300 MG: 300 CAPSULE ORAL at 09:43

## 2021-04-07 RX ADMIN — CELECOXIB 200 MG: 200 CAPSULE ORAL at 20:22

## 2021-04-07 RX ADMIN — ALVIMOPAN 12 MG: 12 CAPSULE ORAL at 20:22

## 2021-04-07 NOTE — PROGRESS NOTES
Discharge Planning Assessment  [facility]     Patient Name: Darwin Sanchez  MRN: [id number]  Today's Date: 4/7/2021    Admit Date: 4/6/2021    Discharge Needs Assessment     Row Name 04/07/21 1518       Living Environment    Lives With  spouse    Name(s) of Who Lives With Patient  Mayra Sanchez, spouse, [phone]    Current Living Arrangements  home/apartment/condo    Primary Care Provided by  self    Provides Primary Care For  no one    Family Caregiver if Needed  grandchild(claudio), adult;spouse    Family Caregiver Names  Pt states his wife, son, and daughter (states she is a nurse) will assist at home.    Quality of Family Relationships  involved;supportive    Able to Return to Prior Arrangements  yes       Resource/Environmental Concerns    Resource/Environmental Concerns  home accessibility    Home Accessibility Concerns  stairs to enter home States there are 5 steps to enter through the garage and 4 steps to enter through the front door.       Transition Planning    Patient/Family Anticipates Transition to  home with family    Patient/Family Anticipated Services at Transition  home health care    Transportation Anticipated  family or friend will provide;other (see comments)       Discharge Needs Assessment    Readmission Within the Last 30 Days  no previous admission in last 30 days    Equipment Currently Used at Home  none    Concerns to be Addressed  care coordination/care conferences;decision-making;discharge planning    Anticipated Changes Related to Illness  none    Equipment Needed After Discharge  wound care supplies;colostomy/ostomy supplies    Discharge Facility/Level of Care Needs  home with home health    Provided Post Acute Provider List?  Refused    Refused Provider List Comment  Requested [facility] see at D/C as he has used them in the past.    Provided Post Acute Provider Quality & Resource List?  Refused        Discharge Plan     Row Name 04/07/21 1531       Plan    Plan  Home with  family and [facility]. May need ambulance/stretcher transport at D/C as he is restricted to NO SITTING.    Patient/Family in Agreement with Plan  yes    Plan Comments  Met with pt. and Moses Garcia, son, [phone] at bedside. Pt gave permission to speak with family present. Explained roll of . Face sheet and pharmacy verified. Pt lives with Mayra Sanchez, spouse, [phone].  States there are five steps to enter through the garage and four steps to enter through the front door. Denies the use of any DME.  Pt is normally independent with ADLs. Pt has never been to Rehab but has used [facility] in the past. Pt requested [facility] see at D/C. Declined Medicare Ratings stating he has used them before. Pt’s PCP is Dr. Niko Patrick. Uses [facility] on [address] [place]. Pt normally drives himself to appointments. At discharge, pt. may require ambulance or stretcher van due to restricted to no sitting.  Pt has a Living Will on file. Explained that CCP would follow to assess for discharge needs.  Silvano Ruiz RN-BC        Continued Care and Services - Admitted Since 4/6/2021     Home Medical Care Coordination complete    Service Provider Request Status Selected Services Address Phone Fax Patient Preferred    [facility]   Selected Home Health Services [address] [address] [phone] --                Demographic Summary     Row Name 04/07/21 1516       General Information    Admission Type  inpatient    Arrived From  PACU/recovery room    Required Notices Provided  Important Message from Medicare    Reason for Consult  discharge planning;decision-making;care coordination/care conference    Preferred Language  English     Used During This Interaction  no        Functional Status     Row Name 04/07/21 1517       Functional Status    Usual Activity Tolerance  good    Current Activity Tolerance  moderate        Functional Status, IADL    Medications  independent    Meal Preparation  independent    Housekeeping  independent    Laundry  independent    Shopping  independent       Mental Status    General Appearance WDL  WDL       Mental Status Summary    Recent Changes in Mental Status/Cognitive Functioning  no changes       Employment/    Employment Status  self-employed    Current or Previous Occupation  other (see comments)    Employment/ Comments  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

## 2021-04-07 NOTE — THERAPY EVALUATION
Patient Name: Darwin Sanchez  : 1945    MRN: [id number]                              Today's Date: 2021       Admit Date: 2021    Visit Dx:     ICD-10-CM ICD-9-CM   1. Anal cancer (CMS/HCC)  C21.0 154.3     Patient Active Problem List   Diagnosis   • Primary osteoarthritis of left knee   • Osteoarthritis of knee   • Psoriasis with arthropathy (CMS/HCC)   • Primary malignant neoplasm of skin   • Overweight   • Impaired fasting glucose   • Hypercholesterolemia   • Gouty arthropathy   • Carotid artery occlusion   • Benign essential hypertension   • Squamous cell carcinoma of anal margin   • Hypertension   • Arthritis   • Anal cancer (CMS/HCC)   • Perirectal ulcer   • Thoracic aortic aneurysm without rupture (CMS/HCC)   • Coronary artery disease involving coronary bypass graft of native heart without angina pectoris   • Preop cardiovascular exam     Past Medical History:   Diagnosis Date   • Anal cancer (CMS/HCC)     GROIN   • Anxiety    • Arthritis    • Carotid artery occlusion 2019   • Chest pain 2017   • Coronavirus infection 2020   • Dyspnea 2017   • ED (erectile dysfunction)    • Fish hook injury of finger 2017    RIGHT INDEX FINGER, SEEN AT [facility] ER   • Gout    • Gouty arthropathy 2019   • History of 2019 novel coronavirus disease (COVID-19) 2020    PER PCP-ANTIBODY TEST   • History of chemotherapy    • History of radiation therapy    • Hyperlipidemia 2019   • Hypertension    • Impaired fasting glucose 2019   • OA (osteoarthritis)     MULTIPLE SITES   • Osteoporosis    • Psoriasis    • Psoriasis with arthropathy (CMS/HCC) 2019   • Radiation dermatitis    • Rectal bleeding     WOUND, TO HAVE BIOPSIED PER DR MARCH   • Rheumatoid arthritis (CMS/HCC)    • SCC (squamous cell carcinoma)     ANAL, S/P EXCISION AROUND , HAD RADIATION AND CHEMO   • Uses hearing aid     BILATERAL     Past Surgical History:   Procedure Laterality Date   • COLONOSCOPY     •  CONDYLOMA REMOVAL N/A 2/23/2021    Procedure: PERINEAL/ANAL LESION EXCISION/FULGURATION;  Surgeon: Makeda Ocasio MD;  Location: [facility] MAIN OR;  Service: General;  Laterality: N/A;   • EXAM UNDER ANESTHESIA, RECTAL BIOPSY  04/02/2021   • KNEE ARTHROSCOPY Bilateral     OVER 35 YRS AGO, DR. CACERES   • RECTAL EXAMINATION UNDER ANESTHESIA N/A 03/09/2018    WITH BIOPSIES OF ANUS, DR. LINA ISIDRO AT [facility]   • SOFT TISSUE BIOPSY  04/02/2021    GROIN   • SQUAMOUS CELL CARCINOMA EXCISION N/A 09/23/2016    DIETER RECTAL AREA, DR. LINA ISIDRO AT [facility]   • TOTAL KNEE ARTHROPLASTY Right 10/17/2018    Procedure: TOTAL KNEE ARTHROPLASTY;  Surgeon: Cooper Ruby MD;  Location: [facility];  Service: Orthopedics     General Information     [facility] Name 04/07/21 1214          Physical Therapy Time and Intention    Document Type  evaluation  (Pended)   -     Mode of Treatment  individual therapy;physical therapy  (Pended)   -[facility] Name 04/07/21 1214          General Information    Patient Profile Reviewed  yes  (Pended)   -     Prior Level of Function  independent:;gait;transfer;bed mobility  (Pended)   -     Existing Precautions/Restrictions  fall;other (see comments)  (Pended)  Must lay flat, no sitting EOB, must log roll to transfer  -     Barriers to Rehab  medically complex  (Pended)   -[facility] Name 04/07/21 1214          Living Environment    Lives With  spouse  (Pended)   -     Row Name 04/07/21 1214          Home Main Entrance    Number of Stairs, Main Entrance  three;four  (Pended)   -     Stair Railings, Main Entrance  none  (Pended)   -[facility] Name 04/07/21 1214          Stairs Within Home, Primary    Number of Stairs, Within Home, Primary  none  (Pended)   -[facility] Name 04/07/21 1214          Cognition    Orientation Status (Cognition)  oriented x 4  (Pended)   -[facility] Name 04/07/21 1214          Safety Issues, Functional Mobility    Impairments Affecting Function (Mobility)   balance;strength;postural/trunk control;pain;endurance/activity tolerance;shortness of breath  (Pended)   -       User Key  (r) = Recorded By, (t) = Taken By, (c) = Cosigned By    Initials Name Provider Type     Genesis Mcfadden, PT Student PT Student        Mobility     Row Name 04/07/21 1218          Bed Mobility    Bed Mobility  supine-sit;sit-supine;rolling left  (Pended)   -     Rolling Left [place] (Bed Mobility)  verbal cues;nonverbal cues (demo/gesture);contact guard  (Pended)   -     Supine-Sit [place] (Bed Mobility)  verbal cues;nonverbal cues (demo/gesture);set up;2 person assist;moderate assist (50% patient effort)  (Pended)   -     Sit-Supine [place] (Bed Mobility)  set up;verbal cues;nonverbal cues (demo/gesture);minimum assist (75% patient effort);1 person assist;moderate assist (50% patient effort)  (Pended)   -     Assistive Device (Bed Mobility)  bed rails;head of bed elevated  (Pended)   -     Comment (Bed Mobility)  Pt educated on log rolling to sit and precautions with not sitting directly up. Pt able to use UEs to roll to side and to push up to sit from sidelying.  (Pended)   -     Row Name 04/07/21 1218          Sit-Stand Transfer    Sit-Stand [place] (Transfers)  contact guard;2 person assist;minimum assist (75% patient effort)  (Pended)   -     Assistive Device (Sit-Stand Transfers)  walker, 4-wheeled  (Pended)   -     Row Name 04/07/21 1218          Gait/Stairs (Locomotion)    [place] Level (Gait)  contact guard;1 person to manage equipment;1 person assist  (Pended)   -     Assistive Device (Gait)  walker, front-wheeled  (Pended)   -     Distance in Feet (Gait)  450ft  (Pended)   -     Deviations/Abnormal Patterns (Gait)  stride length decreased  (Pended)   -     Bilateral Gait Deviations  heel strike decreased;forward flexed posture  (Pended)   -     Comment (Gait/Stairs)  Pt ambulated 450ft around unit with CGA x2 for equipment  management. No complaints of SOA/fatigue - pt asking to walk further after each loop around unit.  (Pended)   -       User Key  (r) = Recorded By, (t) = Taken By, (c) = Cosigned By    Initials Name Provider Type     Genesis Mcfadden PT Student PT Student        Obj/Interventions     Row Name 04/07/21 1224          Range of Motion Comprehensive    General Range of Motion  no range of motion deficits identified  (Pended)   -BH     Row Name 04/07/21 1224          Strength Comprehensive (MMT)    General Manual Muscle Testing (MMT) Assessment  lower extremity strength deficits identified  (Pended)   -     Comment, General Manual Muscle Testing (MMT) Assessment  Generalized weakness, BLE grossly 3+/5 with ambulation  (Pended)   -BH     Row Name 04/07/21 1224          Balance    Balance Assessment  sitting static balance;standing static balance  (Pended)   -     Static Sitting Balance  mild impairment;supported;sitting, edge of bed  (Pended)  Pt leaning to L when sitting EOB to maintain sitting precautions - holding onto bedrail for balance.  -     Static Standing Balance  WFL;unsupported;standing  (Pended)   -       User Key  (r) = Recorded By, (t) = Taken By, (c) = Cosigned By    Initials Name Provider Type     Genesis Mcfadden PT Student PT Student        Goals/Plan     Row Name 04/07/21 1234          Bed Mobility Goal 1 (PT)    Activity/Assistive Device (Bed Mobility Goal 1, PT)  bed mobility activities, all  (Pended)   -     [place] Level/Cues Needed (Bed Mobility Goal 1, PT)  contact guard assist  (Pended)   -     Time Frame (Bed Mobility Goal 1, PT)  1 week  (Pended)   -BH     Row Name 04/07/21 1234          Transfer Goal 1 (PT)    Activity/Assistive Device (Transfer Goal 1, PT)  transfers, all  (Pended)   -     [place] Level/Cues Needed (Transfer Goal 1, PT)  contact guard assist  (Pended)   -     Time Frame (Transfer Goal 1, PT)  1 week  (Pended)   -BH     Row Name 04/07/21 1234           Gait Training Goal 1 (PT)    Activity/Assistive Device (Gait Training Goal 1, PT)  gait (walking locomotion)  (Pended)   -     [place] Level (Gait Training Goal 1, PT)  supervision required  (Pended)   -     Distance (Gait Training Goal 1, PT)  250ft  (Pended)   -     Time Frame (Gait Training Goal 1, PT)  1 week  (Pended)   -       User Key  (r) = Recorded By, (t) = Taken By, (c) = Cosigned By    Initials Name Provider Type     Genesis Mcfadden, PT Student PT Student        Clinical Impression     Row Name 04/07/21 1225          Pain    Additional Documentation  Pain Scale: FACES Pre/Post-Treatment (Group)  (Pended)   -     Row Name 04/07/21 1225          Pain Scale: Numbers Pre/Post-Treatment    Pain Intervention(s)  Repositioned;Ambulation/increased activity  (Pended)   -     Row Name 04/07/21 1222          Pain Scale: FACES Pre/Post-Treatment    Pain: FACES Scale, Pretreatment  4-->hurts little more  (Pended)   -     Posttreatment Pain Rating  4-->hurts little more  (Pended)   -     Pain Location  abdomen  (Pended)   -     Pre/Posttreatment Pain Comment  Pt reporting generalized abdominal pain with movement  (Pended)   -     Row Name 04/07/21 8625          Plan of Care Review    Plan of Care Reviewed With  patient;son  (Pended)   -     Outcome Summary  Pt is a 74 yo M who is s/p abdominal perineal resection with Bilateral Gluteal Advancement Flap And Gracilis Muscle Flap. Pt lives with his spouse and his son reports he will have other family coming into the home to help him. Pt has 3-4 steps to get into his house without handrails, but his son reports he will be able to install those. Pt's son also reports he has a yenni-sized bed at home where the HOB lifts, however the bed is tall and he may have difficulty getting into it. Pt presents to PT with increased difficulty getting out of bed, but responded well to verbal and nonverbal cues for log rolling and maintaining sitting  precautions to get OOB. Pt completed min-mod A x2 for bed mobility and CGA x2 and was able to walk 450ft in the hallway. Pt did not report SOA/fatigue with gait, but is still having pain and difficulty with bed mobility and transfers. Pt will benefit from skilled PT to address those deficits.  (Pended)   -     Row Name 04/07/21 1225          Therapy Assessment/Plan (PT)    Patient/Family Therapy Goals Statement (PT)  Return to PLOF  (Pended)   -     Rehab Potential (PT)  good, to achieve stated therapy goals  (Pended)   -     Criteria for Skilled Interventions Met (PT)  yes  (Pended)   -     Row Name 04/07/21 1225          Positioning and Restraints    Pre-Treatment Position  in bed  (Pended)   -     Post Treatment Position  bed  (Pended)   -BH     In Bed  supine;call light within reach;encouraged to call for assist;exit alarm on;with family/caregiver  (Pended)   -       User Key  (r) = Recorded By, (t) = Taken By, (c) = Cosigned By    Initials Name Provider Type    Genesis Dior, PT Student PT Student        Outcome Measures     Row Name 04/07/21 9925          How much help from another person do you currently need...    Turning from your back to your side while in flat bed without using bedrails?  2  (Pended)   -BH     Moving from lying on back to sitting on the side of a flat bed without bedrails?  2  (Pended)   -BH     Moving to and from a bed to a chair (including a wheelchair)?  2  (Pended)   -BH     Standing up from a chair using your arms (e.g., wheelchair, bedside chair)?  2  (Pended)   -BH     Climbing 3-5 steps with a railing?  2  (Pended)   -BH     To walk in hospital room?  3  (Pended)   -     AM-PAC 6 Clicks Score (PT)  13  (Pended)   -     Row Name 04/07/21 4789          Functional Assessment    Outcome Measure Options  AM-PAC 6 Clicks Basic Mobility (PT)  (Pended)   -       User Key  (r) = Recorded By, (t) = Taken By, (c) = Cosigned By    Initials Name Provider Type    TARA Mcfadden  Genesis, PT Student PT Student        Physical Therapy Education                 Title: PT OT SLP Therapies (Done)     Topic: Physical Therapy (Done)     Point: Mobility training (Done)     Learning Progress Summary           Patient Acceptance, E,TB,D, VU,NR by  at 4/7/2021 1236   Family Acceptance, E,TB,D, VU,NR by  at 4/7/2021 1236                   Point: Home exercise program (Done)     Learning Progress Summary           Patient Acceptance, E,TB,D, VU,NR by  at 4/7/2021 1236   Family Acceptance, E,TB,D, VU,NR by  at 4/7/2021 1236                   Point: Body mechanics (Done)     Learning Progress Summary           Patient Acceptance, E,TB,D, VU,NR by  at 4/7/2021 1236   Family Acceptance, E,TB,D, VU,NR by  at 4/7/2021 1236                   Point: Precautions (Done)     Learning Progress Summary           Patient Acceptance, E,TB,D, VU,NR by  at 4/7/2021 1236   Family Acceptance, E,TB,D, VU,NR by  at 4/7/2021 1236                               User Key     Initials Effective Dates Name Provider Type Discipline     02/01/21 -  Genesis Mcfadden, PT Student PT Student PT              PT Recommendation and Plan  Planned Therapy Interventions (PT): (P) bed mobility training, balance training, gait training, home exercise program, patient/family education, strengthening, transfer training  Plan of Care Reviewed With: (P) patient, son  Outcome Summary: (P) Pt is a 74 yo M who is s/p abdominal perineal resection with Bilateral Gluteal Advancement Flap And Gracilis Muscle Flap. Pt lives with his spouse and his son reports he will have other family coming into the home to help him. Pt has 3-4 steps to get into his house without handrails, but his son reports he will be able to install those. Pt's son also reports he has a yenni-sized bed at home where the HOB lifts, however the bed is tall and he may have difficulty getting into it. Pt presents to PT with increased difficulty getting out of bed, but  responded well to verbal and nonverbal cues for log rolling and maintaining sitting precautions to get OOB. Pt completed min-mod A x2 for bed mobility and CGA x2 and was able to walk 450ft in the hallway. Pt did not report SOA/fatigue with gait, but is still having pain and difficulty with bed mobility and transfers. Pt will benefit from skilled PT to address those deficits.     Time Calculation:   PT Charges     Row Name 04/07/21 1236             Time Calculation    Start Time  0915  (Pended)   -      Stop Time  0938  (Pended)   -      Time Calculation (min)  23 min  (Pended)   -      PT Received On  04/07/21  (Pended)   -      PT - Next Appointment  04/08/21  (Pended)   -      PT Goal Re-Cert Due Date  04/14/21  (Pended)   -         Time Calculation- PT    Total Timed Code Minutes- PT  15 minute(s)  (Pended)   -        User Key  (r) = Recorded By, (t) = Taken By, (c) = Cosigned By    Initials Name Provider Type     Genesis Mcfadden PT Student PT Student        Therapy Charges for Today     Code Description Service Date Service Provider Modifiers Qty    [id number]  PT EVAL MOD COMPLEXITY 2 4/7/2021 Genesis Mcfadden, PT Student GP 1    [id number]  PT THER SUPP EA 15 MIN 4/7/2021 Genesis Mcfadden, PT Student GP 1    [id number]  PT THERAPEUTIC ACT EA 15 MIN 4/7/2021 Genesis Mcfadden PT Student GP 1          PT G-Codes  Outcome Measure Options: (P) AM-PAC 6 Clicks Basic Mobility (PT)  AM-PAC 6 Clicks Score (PT): (P) 13    Genesis Mcfadden PT Student  4/7/2021

## 2021-04-07 NOTE — NURSING NOTE
"[facility] dept - patient has anal cancer and s/p abdominal perineal resection with colostomy (Dr. Ocasio) and plastics (Dr. Bardales) performed bilateral gluteal fasciocutaneous advancement flaps and L gracilis muscle flap to pelvic floor on 4/6. Stoma protrudes some from abdomen, deep red in color, no stool, serousang dng present to pouch, + flatus noted while I was in room.  Discussed briefly with patient what the \"stoma\" is. Patient states \"that's the intestine, right?\"  Patient's son at bedside and he reports he will be assisting patient at home and he and other family members are trying to visit during ostomy teaching. Discussed that there will be ongoing instruction at home, as well, from home health nurse.   Spoke to patient's daughter on the phone, who happens to be a nurse, and coordinated for her to be present at 10 am when ostomy nurse sees patient.   "

## 2021-04-07 NOTE — PLAN OF CARE
Goal Outcome Evaluation:  Plan of Care Reviewed With: (P) patient, son     Outcome Summary: (P) Pt is a 74 yo M who is s/p abdominal perineal resection with Bilateral Gluteal Advancement Flap And Gracilis Muscle Flap. Pt lives with his spouse and his son reports he will have other family coming into the home to help him. Pt has 3-4 steps to get into his house without handrails, but his son reports he will be able to install those. Pt's son also reports he has a yenni-sized bed at home where the HOB lifts, however the bed is tall and he may have difficulty getting into it. Pt presents to PT with increased difficulty getting out of bed, but responded well to verbal and nonverbal cues for log rolling and maintaining sitting precautions to get OOB. Pt completed min-mod A x2 for bed mobility and CGA x2 and was able to walk 450ft in the hallway. Pt did not report SOA/fatigue with gait, but is still having pain and difficulty with bed mobility and transfers. Pt will benefit from skilled PT to address those deficits.

## 2021-04-07 NOTE — PROGRESS NOTES
Continued Stay Note  [facility]     Patient Name: Darwin Sanchez  MRN: [id number]  Today's Date: 4/7/2021    Admit Date: 4/6/2021    Discharge Plan     Row Name 04/07/21 1531       Plan    Plan  Home with family and [facility]. May need ambulance/stretcher transport at D/C as he is restricted to NO SITTING.    Patient/Family in Agreement with Plan  yes    Plan Comments  Met with pt. and Moses Garcia, son, [phone] at bedside. Pt gave permission to speak with family present. Explained roll of . Face sheet and pharmacy verified. Pt lives with Mayra Sanchez, spouse, [phone].  States there are five steps to enter through the garage and four steps to enter through the front door. Denies the use of any DME.  Pt is normally independent with ADLs. Pt has never been to Rehab but has used [facility] in the past. Pt requested [facility] see at D/C. Declined Medicare Ratings stating he has used them before. Pt’s PCP is Dr. Niko Patrick. [facility] on [address] [place]. Pt normally drives himself to appointments. At discharge, pt. may require ambulance or stretcher van due to restricted to no sitting.  Pt has a Living Will on file. Explained that CCP would follow to assess for discharge needs.  Silvano Ruiz RN-BC        Discharge Codes    No documentation.             Silvano Ruiz RN

## 2021-04-07 NOTE — PROGRESS NOTES
Progress Note    Pod 1      S: positive flatus,  negative BM. positive ambulating. Pain controlled with eras.     O:  Temp:  [97.1 °F (36.2 °C)-98.2 °F (36.8 °C)] 97.8 °F (36.6 °C)  Heart Rate:  [56-72] 67  Resp:  [14-18] 16  BP: ()/(42-96) 100/66      Intake/Output Summary (Last 24 hours) at 4/7/2021 1232  Last data filed at 4/7/2021 0833  Gross per 24 hour   Intake 3404.25 ml   Output 1805 ml   Net 1599.25 ml       Abd:   soft, non distended  Incision: clean, dry  Ostomy pink.    Results from last 7 days   Lab Units 04/07/21  0456   WBC 10*3/mm3 9.76   HEMOGLOBIN g/dL 10.1*   HEMATOCRIT % 30.8*   PLATELETS 10*3/mm3 136*     Results from last 7 days   Lab Units 04/07/21  0456   SODIUM mmol/L 139   POTASSIUM mmol/L 3.8   CHLORIDE mmol/L 105   CO2 mmol/L 26.9   BUN mg/dL 27*   CREATININE mg/dL 1.34*   GLUCOSE mg/dL 105*   CALCIUM mg/dL 7.4*       Results from last 7 days   Lab Units 04/07/21  0456   MAGNESIUM mg/dL 2.2         A/P: 75 y.o. male with s/p ABDOMINAL PERINEAL RESECTION LAPAROSCOPIC WITH DAVINCI ROBOT, BILATERAL GLUTEAL ADVANCEMENT FLAP AND GRACILIS MUSCLE FLAP    Tolerating clears and advance  Ambulate  Pain controlled with eras meds  Setting up

## 2021-04-07 NOTE — PLAN OF CARE
Goal Outcome Evaluation:  Plan of Care Reviewed With: patient  Progress: improving  Outcome Summary: VSS. Pt ambulated in hallway x2. Wound ostomy did some education about colostomy. Worked with PT. IV dilaudid given x2. Will continue to monitor.

## 2021-04-07 NOTE — DISCHARGE PLACEMENT REQUEST
"Darwin Cuba W (75 y.o. Male)     Date of Birth Social Security Number Address Home Phone MRN    1945  [id number] [facility] [place] [facility] [phone] [id number]    Zoroastrian Marital Status          Adventism        Admission Date Admission Type Admitting Provider Attending Provider Department, Room/Bed    4/6/21 Elective Makeda Ocasio MD Allen, Nechol L, MD [address], [address]    Discharge Date Discharge Disposition Discharge Destination                       Attending Provider: Makeda Ocasio MD    Allergies: Hydrocodone-acetaminophen    Isolation: None   Infection: None   Code Status: CPR    Ht: 182.9 cm (72\")   Wt: 110 kg (242 lb 11.6 oz)    Admission Cmt: None   Principal Problem: Anal cancer (CMS/HCC) [C21.0] More...                 Active Insurance as of 4/6/2021     Primary Coverage     Payor Plan Insurance Group Employer/Plan Group    MEDICARE MEDICARE A & B      Payor Plan Address Payor Plan Phone Number Payor Plan Fax Number Effective Dates    [address] [phone]  12/1/2012 - None Entered    [facility] [id number]       Subscriber Name Subscriber Birth Date Member ID       DARWIN CUBA W 1945 [id number]           Secondary Coverage     Payor Plan Insurance Group Employer/Plan Group    BHC [facility] SUPP [id number]     Payor Plan Address Payor Plan Phone Number Payor Plan Fax Number Effective Dates    [address]   4/1/2021 - None Entered    [facility] [id number]       Subscriber Name Subscriber Birth Date Member ID       DARWIN CUBA W 1945 [id number]                 Emergency Contacts      (Rel.) Home Phone Work Phone Mobile Phone    Mayra Cuba (Spouse) [phone] -- [phone]    ZI VANCE (Daughter) [phone] -- --    ROSA ELENAREYNA (Son) [phone] -- --              "

## 2021-04-07 NOTE — PROGRESS NOTES
SUBJECTIVE:   Did well overnight.  Pain is reasonably well controlled and biggest complaint is pain with coughing.  No nausea.  Was able to walk last night.    OBJECTIVE:  Vitals:    04/07/21 0723   BP: 100/66   Pulse: 67   Resp: 16   Temp: 97.8 °F (36.6 °C)   SpO2: 92%     Physical Exam  NAD, in bed supine  RRR  No dyspnea  Left left gracilis flap dressing in place, drains with thin sanguinous drainage  Perineum flaps pink and viable, soft with no evidence of fluid collection or hematoma, no edge necrosis or breakdown    PLAN:  POD1 bilateral gluteal V-Y advancement and gracilis flap to pelvic floor  - doing well  - continue supine position with laying, cannot sit on bottom for any reason, must transfer from supine to standing without sitting on edge of bed, ok to walk with short shuffling steps  - drain care  - wound care with bacitracin and ABD pads

## 2021-04-07 NOTE — PLAN OF CARE
Vitals stable, remains on 2L NC. Repositions well with minimal assist. Ambulated tonight to hallway with assist x 2. Dressings intact with scant dried drainage. Pain treated x 1 with IV Dilaudid. Scheduled medication given also for pain mgmt. No stool present from new colostomy site. CHIARA Left 65ml,  CHIARA Right 30ml. IVF infusing.   Problem: Adult Inpatient Plan of Care  Goal: Absence of Hospital-Acquired Illness or Injury  Intervention: Identify and Manage Fall Risk  Recent Flowsheet Documentation  Taken 4/7/2021 0500 by Mignon De León RN  Safety Promotion/Fall Prevention: safety round/check completed  Taken 4/7/2021 0344 by Mignon De León RN  Safety Promotion/Fall Prevention: safety round/check completed  Taken 4/7/2021 0201 by Mignon De León RN  Safety Promotion/Fall Prevention: safety round/check completed  Taken 4/7/2021 0010 by Mignon De León RN  Safety Promotion/Fall Prevention: safety round/check completed  Taken 4/6/2021 2256 by Mignon De León RN  Safety Promotion/Fall Prevention: safety round/check completed  Taken 4/6/2021 2221 by Mignon De León RN  Safety Promotion/Fall Prevention: safety round/check completed  Taken 4/6/2021 2036 by Mignon De León RN  Safety Promotion/Fall Prevention: safety round/check completed  Taken 4/6/2021 2032 by Mignon De León RN  Safety Promotion/Fall Prevention: safety round/check completed     Problem: Adult Inpatient Plan of Care  Goal: Absence of Hospital-Acquired Illness or Injury  Intervention: Prevent Skin Injury  Recent Flowsheet Documentation  Taken 4/7/2021 0500 by Mignon De León RN  Body Position: side-lying, right  Taken 4/7/2021 0344 by Mignon De León RN  Body Position: position changed independently  Taken 4/7/2021 0201 by Mignon De León RN  Body Position: supine  Taken 4/7/2021 0010 by Mignon De León RN  Body Position: position maintained  Taken 4/6/2021 2256 by Mignon De León RN  Body Position: side-lying, left  Taken 4/6/2021 2036 by Sarthak  ALEX Crow  Body Position: supine  Taken 4/6/2021 2032 by Mignon De León RN  Body Position: supine  Skin Protection: adhesive use limited   Goal Outcome Evaluation:  Plan of Care Reviewed With: patient  Progress: improving

## 2021-04-08 LAB
ALBUMIN SERPL-MCNC: 2.8 G/DL (ref 3.5–5.2)
ANION GAP SERPL CALCULATED.3IONS-SCNC: 6 MMOL/L (ref 5–15)
BASOPHILS # BLD AUTO: 0.04 10*3/MM3 (ref 0–0.2)
BASOPHILS NFR BLD AUTO: 0.6 % (ref 0–1.5)
BUN SERPL-MCNC: 20 MG/DL (ref 8–23)
BUN/CREAT SERPL: 18.2 (ref 7–25)
CALCIUM SPEC-SCNC: 7.4 MG/DL (ref 8.6–10.5)
CHLORIDE SERPL-SCNC: 108 MMOL/L (ref 98–107)
CO2 SERPL-SCNC: 30 MMOL/L (ref 22–29)
CREAT SERPL-MCNC: 1.1 MG/DL (ref 0.76–1.27)
DEPRECATED RDW RBC AUTO: 40.1 FL (ref 37–54)
EOSINOPHIL # BLD AUTO: 0.08 10*3/MM3 (ref 0–0.4)
EOSINOPHIL NFR BLD AUTO: 1.1 % (ref 0.3–6.2)
ERYTHROCYTE [DISTWIDTH] IN BLOOD BY AUTOMATED COUNT: 12.3 % (ref 12.3–15.4)
GFR SERPL CREATININE-BSD FRML MDRD: 66 ML/MIN/1.73
GLUCOSE SERPL-MCNC: 93 MG/DL (ref 65–99)
HCT VFR BLD AUTO: 27.7 % (ref 37.5–51)
HGB BLD-MCNC: 9.4 G/DL (ref 13–17.7)
LYMPHOCYTES # BLD AUTO: 1.24 10*3/MM3 (ref 0.7–3.1)
LYMPHOCYTES NFR BLD AUTO: 17.8 % (ref 19.6–45.3)
MCH RBC QN AUTO: 30.1 PG (ref 26.6–33)
MCHC RBC AUTO-ENTMCNC: 33.9 G/DL (ref 31.5–35.7)
MCV RBC AUTO: 88.8 FL (ref 79–97)
MONOCYTES # BLD AUTO: 0.7 10*3/MM3 (ref 0.1–0.9)
MONOCYTES NFR BLD AUTO: 10.1 % (ref 5–12)
NEUTROPHILS NFR BLD AUTO: 4.87 10*3/MM3 (ref 1.7–7)
NEUTROPHILS NFR BLD AUTO: 70 % (ref 42.7–76)
PHOSPHATE SERPL-MCNC: 2.1 MG/DL (ref 2.5–4.5)
PLATELET # BLD AUTO: 105 10*3/MM3 (ref 140–450)
PMV BLD AUTO: 12.1 FL (ref 6–12)
POTASSIUM SERPL-SCNC: 4 MMOL/L (ref 3.5–5.2)
RBC # BLD AUTO: 3.12 10*6/MM3 (ref 4.14–5.8)
SODIUM SERPL-SCNC: 144 MMOL/L (ref 136–145)
WBC # BLD AUTO: 6.96 10*3/MM3 (ref 3.4–10.8)

## 2021-04-08 PROCEDURE — 25010000003 CEFAZOLIN IN DEXTROSE 2-4 GM/100ML-% SOLUTION: Performed by: COLON & RECTAL SURGERY

## 2021-04-08 PROCEDURE — 85025 COMPLETE CBC W/AUTO DIFF WBC: CPT | Performed by: COLON & RECTAL SURGERY

## 2021-04-08 PROCEDURE — 25010000002 HYDROMORPHONE PER 4 MG: Performed by: COLON & RECTAL SURGERY

## 2021-04-08 PROCEDURE — 25010000002 ENOXAPARIN PER 10 MG: Performed by: COLON & RECTAL SURGERY

## 2021-04-08 PROCEDURE — 97530 THERAPEUTIC ACTIVITIES: CPT

## 2021-04-08 PROCEDURE — 80069 RENAL FUNCTION PANEL: CPT | Performed by: COLON & RECTAL SURGERY

## 2021-04-08 RX ADMIN — ACETAMINOPHEN 1000 MG: 500 TABLET, FILM COATED ORAL at 21:08

## 2021-04-08 RX ADMIN — METOPROLOL SUCCINATE 25 MG: 25 TABLET, EXTENDED RELEASE ORAL at 09:25

## 2021-04-08 RX ADMIN — CELECOXIB 200 MG: 200 CAPSULE ORAL at 09:25

## 2021-04-08 RX ADMIN — ACETAMINOPHEN 1000 MG: 500 TABLET, FILM COATED ORAL at 09:25

## 2021-04-08 RX ADMIN — HYDROMORPHONE HYDROCHLORIDE 0.25 MG: 1 INJECTION, SOLUTION INTRAMUSCULAR; INTRAVENOUS; SUBCUTANEOUS at 10:01

## 2021-04-08 RX ADMIN — GABAPENTIN 300 MG: 300 CAPSULE ORAL at 09:25

## 2021-04-08 RX ADMIN — CEFAZOLIN SODIUM 2 G: 2 INJECTION, SOLUTION INTRAVENOUS at 06:15

## 2021-04-08 RX ADMIN — HYDROMORPHONE HYDROCHLORIDE 0.25 MG: 1 INJECTION, SOLUTION INTRAMUSCULAR; INTRAVENOUS; SUBCUTANEOUS at 23:41

## 2021-04-08 RX ADMIN — ACETAMINOPHEN 1000 MG: 500 TABLET, FILM COATED ORAL at 15:31

## 2021-04-08 RX ADMIN — CEFAZOLIN SODIUM 2 G: 2 INJECTION, SOLUTION INTRAVENOUS at 22:35

## 2021-04-08 RX ADMIN — ACETAMINOPHEN 1000 MG: 500 TABLET, FILM COATED ORAL at 02:01

## 2021-04-08 RX ADMIN — HYDROMORPHONE HYDROCHLORIDE 0.25 MG: 1 INJECTION, SOLUTION INTRAMUSCULAR; INTRAVENOUS; SUBCUTANEOUS at 01:44

## 2021-04-08 RX ADMIN — ALVIMOPAN 12 MG: 12 CAPSULE ORAL at 09:25

## 2021-04-08 RX ADMIN — ENOXAPARIN SODIUM 40 MG: 40 INJECTION SUBCUTANEOUS at 09:25

## 2021-04-08 RX ADMIN — CELECOXIB 200 MG: 200 CAPSULE ORAL at 21:09

## 2021-04-08 RX ADMIN — GABAPENTIN 300 MG: 300 CAPSULE ORAL at 21:08

## 2021-04-08 RX ADMIN — SODIUM CHLORIDE, POTASSIUM CHLORIDE, SODIUM LACTATE AND CALCIUM CHLORIDE 50 ML/HR: 600; 310; 30; 20 INJECTION, SOLUTION INTRAVENOUS at 06:16

## 2021-04-08 RX ADMIN — CEFAZOLIN SODIUM 2 G: 2 INJECTION, SOLUTION INTRAVENOUS at 15:31

## 2021-04-08 RX ADMIN — ALVIMOPAN 12 MG: 12 CAPSULE ORAL at 21:08

## 2021-04-08 RX ADMIN — HYDROMORPHONE HYDROCHLORIDE 0.25 MG: 1 INJECTION, SOLUTION INTRAMUSCULAR; INTRAVENOUS; SUBCUTANEOUS at 21:09

## 2021-04-08 RX ADMIN — BACITRACIN: 500 OINTMENT TOPICAL at 09:26

## 2021-04-08 RX ADMIN — HYDROMORPHONE HYDROCHLORIDE 0.25 MG: 1 INJECTION, SOLUTION INTRAMUSCULAR; INTRAVENOUS; SUBCUTANEOUS at 06:10

## 2021-04-08 RX ADMIN — HYDROMORPHONE HYDROCHLORIDE 0.25 MG: 1 INJECTION, SOLUTION INTRAMUSCULAR; INTRAVENOUS; SUBCUTANEOUS at 16:51

## 2021-04-08 NOTE — NURSING NOTE
"   04/08/21 1010   Colostomy LLQ   Placement Date/Time: 04/06/21 1444   Inserted by: Dr. Ocasio  Hand Hygiene Completed: Yes  Location: LLQ   Stomal Appliance 2 piece;Changed  (Neema 2 1/4\")   Stoma Appearance round;dusky  (dried blood)   Peristomal Assessment Clean;Intact   Accessories/Skin Care cleansed with water;skin barrier ring   Stoma Function flatus;serosanguineous   Treatment Bag change   Output (mL) 10 mL     [facility] dept - daughter and son present for ostomy care instruction. Stoma maroon in color, dried blood to stoma edges. + flatus requiring bag to be burped per patient report. Diet advanced to GI soft today. Discuss frequency of pouch change and emptying frequency once stool starts producing. Patient comments that he \"is like clockwork\" in regards to bowel function. Discussed drainable vs disposable pouches. Pouch and barrier removed using an adhesive wipe remover. 2 1/4\" flange used to avoid lap puncture sites but may need to use larger barrier, stoma size 35 mm.  Additional discussion included home health services, supply ordering and payment method, if there are issues down the road we are available for outpatient visits. These things all reassuring to patient. Patient's goal is eventual independence with ostomy management but for now it is challenged since he must remain supine or standing, no sitting for 6 weeks.   "

## 2021-04-08 NOTE — PLAN OF CARE
Goal Outcome Evaluation:  Plan of Care Reviewed With: (P) patient, son  Progress: (P) improving  Outcome Summary: (P) Pt ambulated 8x around the unit today with improved fluidity and overall gait speed/mechanics. Pt also demonstrated improved independence with bed mobility, completing log roll to stand transfer with CGA x1. Pt reports plan to D/C tomorrow, but PT will keep pt on for additional day as family has had multiple questions about home set-up following D/C. Will follow-up to ensure safety with D/C home.

## 2021-04-08 NOTE — PROGRESS NOTES
Continued Stay Note  [facility]     Patient Name: Darwin Sanchez  MRN: [id number]  Today's Date: 4/8/2021    Admit Date: 4/6/2021    Discharge Plan     Row Name 04/08/21 1503       Plan    Plan  Home with family and [facility]. May need ambulance/stretcher transport at D/C as he is restricted to NO SITTING. Family requesting Hospital Bed. Notified Lana/Noam and she will call son.    Patient/Family in Agreement with Plan  yes    Plan Comments  Family requesting hospital bed for home and requests [facility] provide. Called Lana/Noam and she will review chart to see if pt qualifies and then call son. Ambulating well but has 5 steps to go up at home. Will need PT to assess pt on stairs. Silvano Ruiz RN-BC        Discharge Codes    No documentation.             Silvano Ruiz, RN

## 2021-04-08 NOTE — PROGRESS NOTES
SUBJECTIVE:   Doing well, some pain still but ambulating, pain controlled with medication. Mostly in abdomen when turning. Drain outputs normal.    OBJECTIVE:  Vitals:    04/08/21 1350   BP: 135/60   Pulse: 65   Resp: 18   Temp: 98.9 °F (37.2 °C)   SpO2: 95%     Physical Exam  NAD, laying in bed supine  RRR  No dyspnea  Left left gracilis flap dressing in place, drains with thin sanguinous drainage  Perineum flaps pink and viable, soft with no evidence of fluid collection or hematoma, no edge necrosis or breakdown    PLAN:  POD1 bilateral gluteal V-Y advancement and gracilis flap to pelvic floor  - doing well  - continue supine position with laying, cannot sit on bottom for any reason, must transfer from supine to standing without sitting on edge of bed, ok to walk with short shuffling steps  - drain care, drains to stay for now  - wound care with bacitracin and ABD pads  - hospital bed for home when dc'd

## 2021-04-08 NOTE — THERAPY TREATMENT NOTE
Patient Name: Darwin Sanchez  : 1945    MRN: [id number]                              Today's Date: 2021       Admit Date: 2021    Visit Dx:     ICD-10-CM ICD-9-CM   1. Anal cancer (CMS/HCC)  C21.0 154.3     Patient Active Problem List   Diagnosis   • Primary osteoarthritis of left knee   • Osteoarthritis of knee   • Psoriasis with arthropathy (CMS/HCC)   • Primary malignant neoplasm of skin   • Overweight   • Impaired fasting glucose   • Hypercholesterolemia   • Gouty arthropathy   • Carotid artery occlusion   • Benign essential hypertension   • Squamous cell carcinoma of anal margin   • Hypertension   • Arthritis   • Anal cancer (CMS/HCC)   • Perirectal ulcer   • Thoracic aortic aneurysm without rupture (CMS/HCC)   • Coronary artery disease involving coronary bypass graft of native heart without angina pectoris   • Preop cardiovascular exam     Past Medical History:   Diagnosis Date   • Anal cancer (CMS/HCC)     GROIN   • Anxiety    • Arthritis    • Carotid artery occlusion 2019   • Chest pain 2017   • Coronavirus infection 2020   • Dyspnea 2017   • ED (erectile dysfunction)    • Fish hook injury of finger 2017    RIGHT INDEX FINGER, SEEN AT [facility] ER   • Gout    • Gouty arthropathy 2019   • History of 2019 novel coronavirus disease (COVID-19) 2020    PER PCP-ANTIBODY TEST   • History of chemotherapy    • History of radiation therapy    • Hyperlipidemia 2019   • Hypertension    • Impaired fasting glucose 2019   • OA (osteoarthritis)     MULTIPLE SITES   • Osteoporosis    • Psoriasis    • Psoriasis with arthropathy (CMS/HCC) 2019   • Radiation dermatitis    • Rectal bleeding     WOUND, TO HAVE BIOPSIED PER DR MARCH   • Rheumatoid arthritis (CMS/HCC)    • SCC (squamous cell carcinoma)     ANAL, S/P EXCISION AROUND , HAD RADIATION AND CHEMO   • Uses hearing aid     BILATERAL     Past Surgical History:   Procedure Laterality Date   • ABDOMINAL PERINEAL  RESECTION N/A 4/6/2021    Procedure: ABDOMINAL PERINEAL RESECTION LAPAROSCOPIC WITH DAVINCI ROBOT;  Surgeon: Makeda Ocasio MD;  Location: [facility];  Service: [facility];  Laterality: N/A;   • BREAST RECONSTRUCTION WITH LATISSIMUS MUSCLE FLAP AND IMPLANT INSERTION Bilateral 4/6/2021    Procedure: BILATERAL GLUTEAL ADVANCEMENT FLAP AND GRACILIS MUSCLE FLAP;  Surgeon: aMrk Bardales MD;  Location: [facility];  Service: Plastics;  Laterality: Bilateral;   • COLONOSCOPY  2018   • CONDYLOMA REMOVAL N/A 2/23/2021    Procedure: PERINEAL/ANAL LESION EXCISION/FULGURATION;  Surgeon: Makeda Ocasio MD;  Location: [facility];  Service: General;  Laterality: N/A;   • EXAM UNDER ANESTHESIA, RECTAL BIOPSY  04/02/2021   • KNEE ARTHROSCOPY Bilateral     OVER 35 YRS AGO, DR. CACERES   • RECTAL EXAMINATION UNDER ANESTHESIA N/A 03/09/2018    WITH BIOPSIES OF ANUS, DR. LINA ISIRDO AT [facility]   • SOFT TISSUE BIOPSY  04/02/2021    GROIN   • SQUAMOUS CELL CARCINOMA EXCISION N/A 09/23/2016    DIETER RECTAL AREA, DR. LINA ISIDRO AT [facility]   • TOTAL KNEE ARTHROPLASTY Right 10/17/2018    Procedure: TOTAL KNEE ARTHROPLASTY;  Surgeon: Cooper Ruby MD;  Location: [facility];  Service: Orthopedics     General Information     Row Name 04/08/21 1402          Physical Therapy Time and Intention    Document Type  therapy note (daily note)  (Pended)   -     Mode of Treatment  individual therapy;physical therapy  (Pended)   -     Row Name 04/08/21 1402          General Information    Existing Precautions/Restrictions  fall;other (see comments)  (Pended)  No sitting  -     Row Name 04/08/21 1402          Cognition    Orientation Status (Cognition)  oriented x 4  (Pended)   -     Row Name 04/08/21 1402          Safety Issues, Functional Mobility    Impairments Affecting Function (Mobility)  balance;strength;postural/trunk control;pain;endurance/activity tolerance;shortness of breath  (Pended)   -       User  Key  (r) = Recorded By, (t) = Taken By, (c) = Cosigned By    Initials Name Provider Type     Genesis Mcfadden PT Student PT Student        Mobility     Row Name 04/08/21 1402          Bed Mobility    Bed Mobility  supine-sit;sit-supine;rolling left  (Pended)   -     Rolling Left [place] (Bed Mobility)  verbal cues;nonverbal cues (demo/gesture);contact guard  (Pended)   -     Supine-Sit [place] (Bed Mobility)  verbal cues;nonverbal cues (demo/gesture);contact guard;1 person assist  (Pended)   -     Sit-Supine [place] (Bed Mobility)  verbal cues;nonverbal cues (demo/gesture);contact guard;1 person assist  (Pended)   -     Assistive Device (Bed Mobility)  bed rails;head of bed elevated  (Pended)   -BH     Row Name 04/08/21 1402          Sit-Stand Transfer    Sit-Stand [place] (Transfers)  contact guard;verbal cues;nonverbal cues (demo/gesture)  (Pended)   -BH     Row Name 04/08/21 1402          Gait/Stairs (Locomotion)    [place] Level (Gait)  standby assist;1 person assist  (Pended)   -     Distance in Feet (Gait)  1200ft  (Pended)   -     Comment (Gait/Stairs)  Pt ambulated 8x around unit with no complaints of fatigue. Pt reported having walked multiple times throughout the day.  (Pended)   -       User Key  (r) = Recorded By, (t) = Taken By, (c) = Cosigned By    Initials Name Provider Type     Genesis Mcfadden PT Student PT Student        Obj/Interventions    No documentation.       Goals/Plan    No documentation.       Clinical Impression     Row Name 04/08/21 1408          Pain    Additional Documentation  Pain Scale: FACES Pre/Post-Treatment (Group)  (Pended)   -BH     Row Name 04/08/21 1408          Pain Scale: FACES Pre/Post-Treatment    Pain: FACES Scale, Pretreatment  0-->no hurt  (Pended)   -     Posttreatment Pain Rating  0-->no hurt  (Pended)   -BH     Row Name 04/08/21 1408          Plan of Care Review    Plan of Care Reviewed With  patient;son  (Pended)   -      Progress  improving  (Pended)   -     Outcome Summary  Pt ambulated 8x around the unit today with improved fluidity and overall gait speed/mechanics. Pt also demonstrated improved independence with bed mobility, completing log roll to stand transfer with CGA x1. Pt reports plan to D/C tomorrow, but PT will keep pt on for additional day as family has had multiple questions about home set-up following D/C. Will follow-up to ensure safety with D/C home.  (Pended)   -     Row Name 04/08/21 1408          Positioning and Restraints    Pre-Treatment Position  in bed  (Pended)   -BH     Post Treatment Position  bed  (Pended)   -BH     In Bed  supine;call light within reach;encouraged to call for assist;exit alarm on;with family/caregiver  (Pended)   -       User Key  (r) = Recorded By, (t) = Taken By, (c) = Cosigned By    Initials Name Provider Type    Genesis Dior PT Student PT Student        Outcome Measures     Row Name 04/08/21 1411          How much help from another person do you currently need...    Turning from your back to your side while in flat bed without using bedrails?  3  (Pended)   -BH     Moving from lying on back to sitting on the side of a flat bed without bedrails?  3  (Pended)   -BH     Moving to and from a bed to a chair (including a wheelchair)?  3  (Pended)   -BH     Standing up from a chair using your arms (e.g., wheelchair, bedside chair)?  3  (Pended)   -BH     Climbing 3-5 steps with a railing?  2  (Pended)   -BH     To walk in hospital room?  4  (Pended)   -     AM-PAC 6 Clicks Score (PT)  18  (Pended)   -     Row Name 04/08/21 1411          Functional Assessment    Outcome Measure Options  AM-PAC 6 Clicks Basic Mobility (PT)  (Pended)   -       User Key  (r) = Recorded By, (t) = Taken By, (c) = Cosigned By    Initials Name Provider Type    Genesis Dior, PT Student PT Student        Physical Therapy Education                 Title: PT OT SLP Therapies (Done)     Topic:  Physical Therapy (Done)     Point: Mobility training (Done)     Learning Progress Summary           Patient Acceptance, E,TB,D, VU,NR by  at 4/8/2021 1412    Acceptance, E,TB,D, VU,NR by  at 4/7/2021 1236   Family Acceptance, E,TB,D, VU,NR by  at 4/8/2021 1412    Acceptance, E,TB,D, VU,NR by  at 4/7/2021 1236                   Point: Home exercise program (Done)     Learning Progress Summary           Patient Acceptance, E,TB,D, VU,NR by  at 4/8/2021 1412    Acceptance, E,TB,D, VU,NR by  at 4/7/2021 1236   Family Acceptance, E,TB,D, VU,NR by  at 4/8/2021 1412    Acceptance, E,TB,D, VU,NR by  at 4/7/2021 1236                   Point: Body mechanics (Done)     Learning Progress Summary           Patient Acceptance, E,TB,D, VU,NR by  at 4/8/2021 1412    Acceptance, E,TB,D, VU,NR by  at 4/7/2021 1236   Family Acceptance, E,TB,D, VU,NR by  at 4/8/2021 1412    Acceptance, E,TB,D, VU,NR by  at 4/7/2021 1236                   Point: Precautions (Done)     Learning Progress Summary           Patient Acceptance, E,TB,D, VU,NR by  at 4/8/2021 1412    Acceptance, E,TB,D, VU,NR by  at 4/7/2021 1236   Family Acceptance, E,TB,D, VU,NR by  at 4/8/2021 1412    Acceptance, E,TB,D, VU,NR by  at 4/7/2021 1236                               User Key     Initials Effective Dates Name Provider Type Discipline     02/01/21 -  Genesis Mcfadden, KAMALJIT Student PT Student PT              PT Recommendation and Plan  Planned Therapy Interventions (PT): bed mobility training, balance training, gait training, home exercise program, patient/family education, strengthening, transfer training  Plan of Care Reviewed With: (P) patient, son  Progress: (P) improving  Outcome Summary: (P) Pt ambulated 8x around the unit today with improved fluidity and overall gait speed/mechanics. Pt also demonstrated improved independence with bed mobility, completing log roll to stand transfer with CGA x1. Pt reports plan to D/C tomorrow,  but PT will keep pt on for additional day as family has had multiple questions about home set-up following D/C. Will follow-up to ensure safety with D/C home.     Time Calculation:   PT Charges     Row Name 04/08/21 1413             Time Calculation    Start Time  1301  (Pended)   -      Stop Time  1327  (Pended)   -      Time Calculation (min)  26 min  (Pended)   -      PT Received On  04/08/21  (Pended)   -      PT - Next Appointment  04/09/21  (Pended)   -         Time Calculation- PT    Total Timed Code Minutes- PT  26 minute(s)  (Pended)   -        User Key  (r) = Recorded By, (t) = Taken By, (c) = Cosigned By    Initials Name Provider Type     Genesis Mcfadden, PT Student PT Student        Therapy Charges for Today     Code Description Service Date Service Provider Modifiers Qty    38756800520  PT EVAL MOD COMPLEXITY 2 4/7/2021 Genesis Mcfadden, PT Student GP 1    [id number]  PT THER SUPP EA 15 MIN 4/7/2021 Genesis Mcfadden, PT Student GP 1    [id number]  PT THERAPEUTIC ACT EA 15 MIN 4/7/2021 Genesis Mcfadden, PT Student GP 1    [id number]  PT THERAPEUTIC ACT EA 15 MIN 4/8/2021 Genesis Mcfadden, PT Student GP 2          PT G-Codes  Outcome Measure Options: (P) AM-PAC 6 Clicks Basic Mobility (PT)  AM-PAC 6 Clicks Score (PT): (P) 18    Genesis Mcfadden PT Student  4/8/2021

## 2021-04-08 NOTE — PLAN OF CARE
Vitals stable, wearing 2L NC while sleeping. Ambulated hallway with assist x 2. Hernández in place and draining well. CHIARA #1 45ml CHIARA #2 30ml. Complaints of pain treated with PRN medication and scheduled. 15ml present in ostomy. Tolerates fluids well. IVF infusing.     Problem: Adult Inpatient Plan of Care  Goal: Absence of Hospital-Acquired Illness or Injury  Intervention: Prevent Infection  Recent Flowsheet Documentation  Taken 4/8/2021 0355 by Mignon De León RN  Infection Prevention:  • environmental surveillance performed  • personal protective equipment utilized  • rest/sleep promoted  • single patient room provided  • hand hygiene promoted  Taken 4/8/2021 0144 by Mignon De León RN  Infection Prevention:  • environmental surveillance performed  • personal protective equipment utilized  • rest/sleep promoted  • single patient room provided  Taken 4/8/2021 0015 by Mignon De León RN  Infection Prevention:  • environmental surveillance performed  • hand hygiene promoted  • personal protective equipment utilized  • single patient room provided  Taken 4/7/2021 2201 by Mignon De León RN  Infection Prevention:  • environmental surveillance performed  • hand hygiene promoted  • personal protective equipment utilized  • rest/sleep promoted  • single patient room provided  Taken 4/7/2021 2022 by Mignon De León RN  Infection Prevention:  • environmental surveillance performed  • hand hygiene promoted  • personal protective equipment utilized  • rest/sleep promoted  • single patient room provided  Taken 4/7/2021 1946 by Mignon De León RN  Infection Prevention:  • environmental surveillance performed  • hand hygiene promoted  • personal protective equipment utilized  • rest/sleep promoted   Goal Outcome Evaluation:  Plan of Care Reviewed With: patient  Progress: improving

## 2021-04-08 NOTE — PROGRESS NOTES
Progress Note    Pod 2      S: positive flatus,  positive BM. positive ambulating. Pain controlled with eras    O:  Temp:  [98.3 °F (36.8 °C)-98.5 °F (36.9 °C)] 98.4 °F (36.9 °C)  Heart Rate:  [59-68] 68  Resp:  [15-17] 16  BP: (120-135)/(60-90) 135/90      Intake/Output Summary (Last 24 hours) at 4/8/2021 0848  Last data filed at 4/8/2021 0616  Gross per 24 hour   Intake 1150 ml   Output 2720 ml   Net -1570 ml       Abd:   soft, non distended  Incision: clean, dry  Ostomy venous congestion    Results from last 7 days   Lab Units 04/08/21  0446   WBC 10*3/mm3 6.96   HEMOGLOBIN g/dL 9.4*   HEMATOCRIT % 27.7*   PLATELETS 10*3/mm3 105*     Results from last 7 days   Lab Units 04/08/21  0446   SODIUM mmol/L 144   POTASSIUM mmol/L 4.0   CHLORIDE mmol/L 108*   CO2 mmol/L 30.0*   BUN mg/dL 20   CREATININE mg/dL 1.10   GLUCOSE mg/dL 93   CALCIUM mg/dL 7.4*   PHOSPHORUS mg/dL 2.1*       Results from last 7 days   Lab Units 04/07/21  0456   MAGNESIUM mg/dL 2.2         A/P: 75 y.o. male with s/p ABDOMINAL PERINEAL RESECTION LAPAROSCOPIC WITH DAVINCI ROBOT, BILATERAL GLUTEAL ADVANCEMENT FLAP AND GRACILIS MUSCLE FLAP    Advance diet  Watch stoma  Hernández out tomorrow

## 2021-04-09 PROCEDURE — 97530 THERAPEUTIC ACTIVITIES: CPT

## 2021-04-09 PROCEDURE — 25010000002 HYDROMORPHONE PER 4 MG: Performed by: COLON & RECTAL SURGERY

## 2021-04-09 PROCEDURE — 25010000003 CEFAZOLIN IN DEXTROSE 2-4 GM/100ML-% SOLUTION: Performed by: COLON & RECTAL SURGERY

## 2021-04-09 PROCEDURE — 25010000002 ENOXAPARIN PER 10 MG: Performed by: COLON & RECTAL SURGERY

## 2021-04-09 RX ADMIN — ACETAMINOPHEN 1000 MG: 500 TABLET, FILM COATED ORAL at 02:07

## 2021-04-09 RX ADMIN — HYDROMORPHONE HYDROCHLORIDE 0.25 MG: 1 INJECTION, SOLUTION INTRAMUSCULAR; INTRAVENOUS; SUBCUTANEOUS at 19:36

## 2021-04-09 RX ADMIN — GABAPENTIN 300 MG: 300 CAPSULE ORAL at 20:33

## 2021-04-09 RX ADMIN — ENOXAPARIN SODIUM 40 MG: 40 INJECTION SUBCUTANEOUS at 08:53

## 2021-04-09 RX ADMIN — CELECOXIB 200 MG: 200 CAPSULE ORAL at 20:33

## 2021-04-09 RX ADMIN — HYDROMORPHONE HYDROCHLORIDE 0.25 MG: 1 INJECTION, SOLUTION INTRAMUSCULAR; INTRAVENOUS; SUBCUTANEOUS at 08:53

## 2021-04-09 RX ADMIN — METOPROLOL SUCCINATE 25 MG: 25 TABLET, EXTENDED RELEASE ORAL at 08:55

## 2021-04-09 RX ADMIN — HYDROMORPHONE HYDROCHLORIDE 0.25 MG: 1 INJECTION, SOLUTION INTRAMUSCULAR; INTRAVENOUS; SUBCUTANEOUS at 12:51

## 2021-04-09 RX ADMIN — HYDROMORPHONE HYDROCHLORIDE 0.25 MG: 1 INJECTION, SOLUTION INTRAMUSCULAR; INTRAVENOUS; SUBCUTANEOUS at 16:16

## 2021-04-09 RX ADMIN — GABAPENTIN 300 MG: 300 CAPSULE ORAL at 08:54

## 2021-04-09 RX ADMIN — BACITRACIN: 500 OINTMENT TOPICAL at 08:55

## 2021-04-09 RX ADMIN — CEFAZOLIN SODIUM 2 G: 2 INJECTION, SOLUTION INTRAVENOUS at 06:15

## 2021-04-09 RX ADMIN — ALVIMOPAN 12 MG: 12 CAPSULE ORAL at 20:33

## 2021-04-09 RX ADMIN — ACETAMINOPHEN 1000 MG: 500 TABLET, FILM COATED ORAL at 20:33

## 2021-04-09 RX ADMIN — HYDROMORPHONE HYDROCHLORIDE 0.25 MG: 1 INJECTION, SOLUTION INTRAMUSCULAR; INTRAVENOUS; SUBCUTANEOUS at 03:40

## 2021-04-09 RX ADMIN — CELECOXIB 200 MG: 200 CAPSULE ORAL at 08:53

## 2021-04-09 RX ADMIN — ACETAMINOPHEN 1000 MG: 500 TABLET, FILM COATED ORAL at 16:12

## 2021-04-09 RX ADMIN — ACETAMINOPHEN 1000 MG: 500 TABLET, FILM COATED ORAL at 08:54

## 2021-04-09 RX ADMIN — HYDROMORPHONE HYDROCHLORIDE 0.25 MG: 1 INJECTION, SOLUTION INTRAMUSCULAR; INTRAVENOUS; SUBCUTANEOUS at 22:50

## 2021-04-09 NOTE — PROGRESS NOTES
Progress Note    Pod 3      S: positive flatus,  positive BM. positive ambulating. Pain controlled with eras    O:  Temp:  [97.4 °F (36.3 °C)-98.9 °F (37.2 °C)] 97.4 °F (36.3 °C)  Heart Rate:  [58-65] 58  Resp:  [16-18] 18  BP: (131-163)/(60-84) 139/83      Intake/Output Summary (Last 24 hours) at 4/9/2021 0746  Last data filed at 4/9/2021 0728  Gross per 24 hour   Intake 0 ml   Output 3040 ml   Net -3040 ml       Abd:   soft, non distended  Incision: clean, dry  Ostomy - venous engorged        A/P: 75 y.o. male with s/p ABDOMINAL PERINEAL RESECTION LAPAROSCOPIC WITH [employer] ROBOT, BILATERAL GLUTEAL ADVANCEMENT FLAP AND GRACILIS MUSCLE FLAP  Advancing diet  Betty DC'd  Perineum wound per plastics  Home likely Monday

## 2021-04-09 NOTE — PLAN OF CARE
Goal Outcome Evaluation:  Plan of Care Reviewed With: patient  Progress: improving  Outcome Summary: VSS. Pt ambulated with PT. Dilaudid given x3. Will continue to monitor.

## 2021-04-09 NOTE — PLAN OF CARE
Goal Outcome Evaluation:  Plan of Care Reviewed With: (P) patient  Progress: (P) improving  Outcome Summary: (P) Pt continues to ambulate safely with only SBA. Used a quad cane today as he reports he uses a cane at baseline, and was able to ambulate with improved overall independence and gait mechanics. Pt also ascended/descended 5 stairs with HRs and completed safely with only CGA. Pt still using bedrails for significant assistance to get out of bed, but is able to do so independently. PT will sign off at this time as pt is safe to D/C home with home health when medically stable.

## 2021-04-09 NOTE — THERAPY DISCHARGE NOTE
Patient Name: Darwin Sanchez  : 1945    MRN: [id number]                              Today's Date: 2021       Admit Date: 2021    Visit Dx:     ICD-10-CM ICD-9-CM   1. Anal cancer (CMS/HCC)  C21.0 154.3     Patient Active Problem List   Diagnosis   • Primary osteoarthritis of left knee   • Osteoarthritis of knee   • Psoriasis with arthropathy (CMS/HCC)   • Primary malignant neoplasm of skin   • Overweight   • Impaired fasting glucose   • Hypercholesterolemia   • Gouty arthropathy   • Carotid artery occlusion   • Benign essential hypertension   • Squamous cell carcinoma of anal margin   • Hypertension   • Arthritis   • Anal cancer (CMS/HCC)   • Perirectal ulcer   • Thoracic aortic aneurysm without rupture (CMS/HCC)   • Coronary artery disease involving coronary bypass graft of native heart without angina pectoris   • Preop cardiovascular exam     Past Medical History:   Diagnosis Date   • Anal cancer (CMS/HCC)     GROIN   • Anxiety    • Arthritis    • Carotid artery occlusion 2019   • Chest pain 2017   • Coronavirus infection 2020   • Dyspnea 2017   • ED (erectile dysfunction)    • Fish hook injury of finger 2017    RIGHT INDEX FINGER, SEEN AT [facility] ER   • Gout    • Gouty arthropathy 2019   • History of 2019 novel coronavirus disease (COVID-19) 2020    PER PCP-ANTIBODY TEST   • History of chemotherapy    • History of radiation therapy    • Hyperlipidemia 2019   • Hypertension    • Impaired fasting glucose 2019   • OA (osteoarthritis)     MULTIPLE SITES   • Osteoporosis    • Psoriasis    • Psoriasis with arthropathy (CMS/HCC) 2019   • Radiation dermatitis    • Rectal bleeding     WOUND, TO HAVE BIOPSIED PER DR MARCH   • Rheumatoid arthritis (CMS/HCC)    • SCC (squamous cell carcinoma)     ANAL, S/P EXCISION AROUND , HAD RADIATION AND CHEMO   • Uses hearing aid     BILATERAL     Past Surgical History:   Procedure Laterality Date   • ABDOMINAL PERINEAL  RESECTION N/A 4/6/2021    Procedure: ABDOMINAL PERINEAL RESECTION LAPAROSCOPIC WITH DAVINCI ROBOT;  Surgeon: Makeda Ocasio MD;  Location: [facility];  Service: [facility];  Laterality: N/A;   • BREAST RECONSTRUCTION WITH LATISSIMUS MUSCLE FLAP AND IMPLANT INSERTION Bilateral 4/6/2021    Procedure: BILATERAL GLUTEAL ADVANCEMENT FLAP AND GRACILIS MUSCLE FLAP;  Surgeon: Mark Bardales MD;  Location: [facility];  Service: Plastics;  Laterality: Bilateral;   • COLONOSCOPY  2018   • CONDYLOMA REMOVAL N/A 2/23/2021    Procedure: PERINEAL/ANAL LESION EXCISION/FULGURATION;  Surgeon: Makeda Ocasio MD;  Location: [facility];  Service: General;  Laterality: N/A;   • EXAM UNDER ANESTHESIA, RECTAL BIOPSY  04/02/2021   • KNEE ARTHROSCOPY Bilateral     OVER 35 YRS AGO, DR. CACERES   • RECTAL EXAMINATION UNDER ANESTHESIA N/A 03/09/2018    WITH BIOPSIES OF ANUS, DR. LINA ISIDRO AT [facility]   • SOFT TISSUE BIOPSY  04/02/2021    GROIN   • SQUAMOUS CELL CARCINOMA EXCISION N/A 09/23/2016    DIETER RECTAL AREA, DR. LINA ISIDRO AT [facility]   • TOTAL KNEE ARTHROPLASTY Right 10/17/2018    Procedure: TOTAL KNEE ARTHROPLASTY;  Surgeon: Cooper Ruby MD;  Location: [facility];  Service: Orthopedics     General Information     Row Name 04/09/21 1512          Physical Therapy Time and Intention    Document Type  discharge treatment;therapy note (daily note)  (Pended)   -     Mode of Treatment  individual therapy;physical therapy  (Pended)   -     Row Name 04/09/21 1512          General Information    Existing Precautions/Restrictions  fall;other (see comments)  (Pended)  No sitting  -     Row Name 04/09/21 1512          Cognition    Orientation Status (Cognition)  oriented x 4  (Pended)   -     Row Name 04/09/21 1512          Safety Issues, Functional Mobility    Impairments Affecting Function (Mobility)  balance;strength;postural/trunk control;pain;endurance/activity tolerance;shortness of breath  (Pended)    -       User Key  (r) = Recorded By, (t) = Taken By, (c) = Cosigned By    Initials Name Provider Type     Genesis Mcfadden, PT Student PT Student        Mobility     Row Name 04/09/21 1513          Bed Mobility    Bed Mobility  supine-sit;sit-supine;rolling left  (Pended)   -     Rolling Left Alvin (Bed Mobility)  standby assist  (Pended)   -     Supine-Sit Alvin (Bed Mobility)  standby assist  (Pended)   -     Sit-Supine Alvin (Bed Mobility)  standby assist  (Pended)   -     Assistive Device (Bed Mobility)  bed rails  (Pended)   -     Comment (Bed Mobility)  Pt still with signficant use of bedrails to assist with bed mobility.  (Pended)   -     Row Name 04/09/21 1513          Sit-Stand Transfer    Sit-Stand Alvin (Transfers)  contact guard  (Pended)   -     Row Name 04/09/21 1513          Gait/Stairs (Locomotion)    Alvin Level (Gait)  standby assist  (Pended)   -     Assistive Device (Gait)  cane, quad  (Pended)   -     Distance in Feet (Gait)  1500ft  (Pended)   -     Alvin Level (Stairs)  contact guard;1 person assist  (Pended)   -     Handrail Location (Stairs)  right side (ascending);left side (descending);right side (descending)  (Pended)   -     Number of Steps (Stairs)  5  (Pended)   -     Ascending Technique (Stairs)  step-to-step  (Pended)   -     Descending Technique (Stairs)  step-to-step  (Pended)   -     Comment (Gait/Stairs)  Pt ambulated without holding on IV pole today and demonstrated slower gait, but still safe. Pt then given quad cane as he reported he uses a cane at baseline and pt able to ambulate with SBA for remainder of distance. Pt also demonstrated good safety and independence with stairs using [facility].  (Pended)   -       User Key  (r) = Recorded By, (t) = Taken By, (c) = Cosigned By    Initials Name Provider Type     Genesis Mcfadden, PT Student PT Student        Obj/Interventions    No documentation.        Goals/Plan    No documentation.       Clinical Impression     Row Name 04/09/21 1519          Pain    Additional Documentation  Pain Scale: FACES Pre/Post-Treatment (Group)  (Pended)   -     Row Name 04/09/21 1519          Pain Scale: FACES Pre/Post-Treatment    Pain: FACES Scale, Pretreatment  0-->no hurt  (Pended)   -     Posttreatment Pain Rating  0-->no hurt  (Pended)   -     Row Name 04/09/21 1519          Plan of Care Review    Plan of Care Reviewed With  patient  (Pended)   -     Progress  improving  (Pended)   -     Outcome Summary  Pt continues to ambulate safely with only SBA. Used a quad cane today as he reports he uses a cane at baseline, and was able to ambulate with improved overall independence and gait mechanics. Pt also ascended/descended 5 stairs with HRs and completed safely with only CGA. Pt still using bedrails for significant assistance to get out of bed, but is able to do so independently. PT will sign off at this time as pt is safe to D/C home with home health when medically stable.  (Pended)   -     Row Name 04/09/21 1519          Positioning and Restraints    Pre-Treatment Position  in bed  (Pended)   -     Post Treatment Position  bed  (Pended)   -BH     In Bed  supine;call light within reach;encouraged to call for assist;exit alarm on  (Pended)   -       User Key  (r) = Recorded By, (t) = Taken By, (c) = Cosigned By    Initials Name Provider Type    Genesis Dior, PT Student PT Student        Outcome Measures     Row Name 04/09/21 1521          How much help from another person do you currently need...    Turning from your back to your side while in flat bed without using bedrails?  3  (Pended)   -BH     Moving from lying on back to sitting on the side of a flat bed without bedrails?  3  (Pended)   -BH     Moving to and from a bed to a chair (including a wheelchair)?  3  (Pended)   -BH     Standing up from a chair using your arms (e.g., wheelchair, bedside chair)?   3  (Pended)   -     Climbing 3-5 steps with a railing?  3  (Pended)   -     To walk in hospital room?  4  (Pended)   -     AM-PAC 6 Clicks Score (PT)  19  (Pended)   -     Row Name 04/09/21 1521          Functional Assessment    Outcome Measure Options  AM-PAC 6 Clicks Basic Mobility (PT)  (Pended)   -       User Key  (r) = Recorded By, (t) = Taken By, (c) = Cosigned By    Initials Name Provider Type     Genesis Mcfadden, PT Student PT Student        Physical Therapy Education                 Title: PT OT SLP Therapies (Done)     Topic: Physical Therapy (Done)     Point: Mobility training (Done)     Learning Progress Summary           Patient Acceptance, E,TB,D, VU,NR by  at 4/9/2021 1523    Acceptance, E,TB,D, VU,NR by  at 4/8/2021 1412    Acceptance, E,TB,D, VU,NR by  at 4/7/2021 1236   Family Acceptance, E,TB,D, VU,NR by  at 4/8/2021 1412    Acceptance, E,TB,D, VU,NR by  at 4/7/2021 1236                   Point: Home exercise program (Done)     Learning Progress Summary           Patient Acceptance, E,TB,D, VU,NR by  at 4/9/2021 1523    Acceptance, E,TB,D, VU,NR by  at 4/8/2021 1412    Acceptance, E,TB,D, VU,NR by  at 4/7/2021 1236   Family Acceptance, E,TB,D, VU,NR by  at 4/8/2021 1412    Acceptance, E,TB,D, VU,NR by  at 4/7/2021 1236                   Point: Body mechanics (Done)     Learning Progress Summary           Patient Acceptance, E,TB,D, VU,NR by  at 4/9/2021 1523    Acceptance, E,TB,D, VU,NR by  at 4/8/2021 1412    Acceptance, E,TB,D, VU,NR by  at 4/7/2021 1236   Family Acceptance, E,TB,D, VU,NR by  at 4/8/2021 1412    Acceptance, E,TB,D, VU,NR by  at 4/7/2021 1236                   Point: Precautions (Done)     Learning Progress Summary           Patient Acceptance, E,TB,D, VU,NR by  at 4/9/2021 1523    Acceptance, E,TB,D, VU,NR by  at 4/8/2021 1412    Acceptance, E,TB,D, VU,NR by  at 4/7/2021 1236   Family Acceptance, E,TB,D, VU,NR by  at 4/8/2021 1412     Acceptance, E,TB,D, VU,NR by  at 4/7/2021 1236                               User Key     Initials Effective Dates Name Provider Type Discipline     02/01/21 -  Genesis Mcfadden PT Student PT Student PT              PT Recommendation and Plan  Planned Therapy Interventions (PT): bed mobility training, balance training, gait training, home exercise program, patient/family education, strengthening, transfer training  Plan of Care Reviewed With: (P) patient  Progress: (P) improving  Outcome Summary: (P) Pt continues to ambulate safely with only SBA. Used a quad cane today as he reports he uses a cane at baseline, and was able to ambulate with improved overall independence and gait mechanics. Pt also ascended/descended 5 stairs with HRs and completed safely with only CGA. Pt still using bedrails for significant assistance to get out of bed, but is able to do so independently. PT will sign off at this time as pt is safe to D/C home with home health when medically stable.     Time Calculation:   PT Charges     Row Name 04/09/21 1527             Time Calculation    Start Time  1311  (Pended)   -      Stop Time  1343  (Pended)   -      Time Calculation (min)  32 min  (Pended)   -      PT Received On  04/09/21  (Pended)   -      PT - Next Appointment  --  (Pended)   -         Time Calculation- PT    Total Timed Code Minutes- PT  32 minute(s)  (Pended)   -        User Key  (r) = Recorded By, (t) = Taken By, (c) = Cosigned By    Initials Name Provider Type     Genesis Mcfadden PT Student PT Student        Therapy Charges for Today     Code Description Service Date Service Provider Modifiers Qty    16387322380 HC PT THERAPEUTIC ACT EA 15 MIN 4/8/2021 Genesis Mcfadden PT Student GP 2    21040375995 HC PT THERAPEUTIC ACT EA 15 MIN 4/9/2021 Genesis Mcfadden, PT Student GP 2    36092052865 HC PT THER SUPP EA 15 MIN 4/9/2021 Genesis Mcfadden PT Student GP 1          PT G-Codes  Outcome Measure Options: (P) AM-PAC 6  Clicks Basic Mobility (PT)  AM-PAC 6 Clicks Score (PT): (P) 19    PT Discharge Summary  Anticipated Discharge Disposition (PT): (P) home with home health, home with assist    Genesis Mcfadden, PT Student  4/9/2021

## 2021-04-09 NOTE — PLAN OF CARE
Goal Outcome Evaluation:  Plan of Care Reviewed With: patient  Progress: improving  Outcome Summary: VSS, walked around nurses station 9x on a walk, c/o pain PRN given 2x, flat and laying on sides, SCDs, IS use encouraged Q1 when awake, ostomy putting out small amount of brown, loose stool, f/c in place,slept most of shift, will continue to monitor

## 2021-04-09 NOTE — PLAN OF CARE
Goal Outcome Evaluation:  Plan of Care Reviewed With: patient  Progress: improving  Outcome Summary: VSS. Walked in hallway 2x. Dilaudid given x2. Will continue to monitor.

## 2021-04-09 NOTE — PROGRESS NOTES
SUBJECTIVE:   Doing well.  Still complaining of some discomfort from the pittman, otherwise pain well controlled.  Has been able to transfer to standing and has walked some.  Is having flatus.     OBJECTIVE:  Vitals:    04/09/21 0740   BP: 139/83   Pulse: 58   Resp: 18   Temp: 97.4 °F (36.3 °C)   SpO2: 99%     Physical Exam  Supine in bed, NAD  RRR  No dyspnea.  Left left gracilis flap dressing in place, drains with thin sanguinous drainage  Perineum flaps pink and viable, soft with no evidence of fluid collection or hematoma, no edge necrosis or breakdown  Drains with thin serosanguinous drainage, output coming down     PLAN:  POD3 bilateral gluteal V-Y advancement and gracilis flap to pelvic floor  - doing well  - continue supine position with laying, cannot sit on bottom for any reason, must transfer from supine to standing without sitting on edge of bed, ok to walk with short shuffling steps  - ok for shower  - drain care, drains to stay for now  - wound care with bacitracin and ABD pads  - DC needs: hospital bed for home, ambulance for transfer home as he cannot sit in a car

## 2021-04-09 NOTE — NURSING NOTE
WOCN dept - ostomy appliance that was placed yesterday intact, no leaking. There is scant presenceof liquid brown stool to bag. Stoma remains moslty deep red in color with lighter redness to stoma edge today. Abdominal distension has improved as well. Patient overall is feeling better and has been advanced to a regular diet. Plan is to go home with home health and family assistance. Patient is agreeable to be set up with [facility]. Pouch not changed today since was changed yesterday. Additional supplies in room.    Voicemail not set up, unable to leave message. Patient reports that she is improving.  She saw Dr Otero yesterday, further drainage performed in office and abx were changed to ciprofloxacin.  She will be seeing Dr Otero again tomorrow.

## 2021-04-10 PROCEDURE — 25010000002 HYDROMORPHONE PER 4 MG: Performed by: COLON & RECTAL SURGERY

## 2021-04-10 PROCEDURE — 25010000002 ENOXAPARIN PER 10 MG: Performed by: COLON & RECTAL SURGERY

## 2021-04-10 PROCEDURE — 99024 POSTOP FOLLOW-UP VISIT: CPT | Performed by: SURGERY

## 2021-04-10 RX ORDER — ZOLPIDEM TARTRATE 5 MG/1
5 TABLET ORAL NIGHTLY PRN
Status: DISCONTINUED | OUTPATIENT
Start: 2021-04-10 | End: 2021-04-12 | Stop reason: HOSPADM

## 2021-04-10 RX ADMIN — BACITRACIN: 500 OINTMENT TOPICAL at 14:36

## 2021-04-10 RX ADMIN — ENOXAPARIN SODIUM 40 MG: 40 INJECTION SUBCUTANEOUS at 09:46

## 2021-04-10 RX ADMIN — ACETAMINOPHEN 1000 MG: 500 TABLET, FILM COATED ORAL at 16:00

## 2021-04-10 RX ADMIN — METOPROLOL SUCCINATE 25 MG: 25 TABLET, EXTENDED RELEASE ORAL at 09:55

## 2021-04-10 RX ADMIN — HYDROMORPHONE HYDROCHLORIDE 0.25 MG: 1 INJECTION, SOLUTION INTRAMUSCULAR; INTRAVENOUS; SUBCUTANEOUS at 12:36

## 2021-04-10 RX ADMIN — ACETAMINOPHEN 1000 MG: 500 TABLET, FILM COATED ORAL at 20:13

## 2021-04-10 RX ADMIN — ACETAMINOPHEN 1000 MG: 500 TABLET, FILM COATED ORAL at 09:46

## 2021-04-10 RX ADMIN — GABAPENTIN 300 MG: 300 CAPSULE ORAL at 09:47

## 2021-04-10 RX ADMIN — HYDROMORPHONE HYDROCHLORIDE 0.25 MG: 1 INJECTION, SOLUTION INTRAMUSCULAR; INTRAVENOUS; SUBCUTANEOUS at 07:01

## 2021-04-10 RX ADMIN — ALVIMOPAN 12 MG: 12 CAPSULE ORAL at 22:52

## 2021-04-10 RX ADMIN — ZOLPIDEM TARTRATE 5 MG: 5 TABLET ORAL at 22:04

## 2021-04-10 RX ADMIN — HYDROMORPHONE HYDROCHLORIDE 0.25 MG: 1 INJECTION, SOLUTION INTRAMUSCULAR; INTRAVENOUS; SUBCUTANEOUS at 18:59

## 2021-04-10 RX ADMIN — CELECOXIB 200 MG: 200 CAPSULE ORAL at 09:46

## 2021-04-10 RX ADMIN — ALVIMOPAN 12 MG: 12 CAPSULE ORAL at 09:46

## 2021-04-10 NOTE — PROGRESS NOTES
SUBJECTIVE:   Pain well controlled, complains of burning on lateral thigh. Has walked, voided, and been able to shower.    OBJECTIVE:  Vitals:    04/10/21 0716   BP: 154/88   Pulse: 60   Resp: 18   Temp: 98 °F (36.7 °C)   SpO2: 97%     Physical Exam  Supine in bed, NAD  RRR  No dyspnea.  Left left gracilis flap incisions c/d/i, no fluid collection or erythemadrains with thin sanguinous drainage  Perineum flaps pink and viable, some maceration at posterior tip of flaps but no obvious breakdown, soft with no evidence of fluid collection or hematoma, no edge necrosis or breakdown  Drains with thin serosanguinous drainage, perineum drain output decreasing     PLAN:  POD4 bilateral gluteal V-Y advancement and gracilis flap to pelvic floor  - doing well  - continue supine position with laying, cannot sit on bottom for any reason, must transfer from supine to standing without sitting on edge of bed, ok to walk with short shuffling steps  - ok for shower  - drain care, drains to stay for now  - wound care with bacitracin and ABD pads  - thigh pain consistent with meralgia paresthetica, no intervention needed  - DC needs: hospital bed for home, ambulance for transfer home as he cannot sit in a car

## 2021-04-10 NOTE — PLAN OF CARE
Goal Outcome Evaluation:  Plan of Care Reviewed With: patient  Progress: no change  Outcome Summary: VSS, c/o pain PRN given, + ambulation, + voiding, CHIARA's draining, IS use encouraged, ostomy +flatulus but no BM, Q2 turn encouraged, will continue to monitor

## 2021-04-10 NOTE — PLAN OF CARE
Goal Outcome Evaluation:  Plan of Care Reviewed With: patient  Progress: improving  Outcome Summary: No major issues over course of shift, VS stable, positive flatus and output from ostomy, no reports of nausea, pt tolerating diet well, up and walking with son, all surgical incisions appear to be healing well, no drainage observed, small serosanguinous output from both drains, will continue to monitor, ambien ordered for sleep tonight

## 2021-04-10 NOTE — PROGRESS NOTES
Postop day 4 after abdominal perineal resection/reconstruction of perineum    Subjective:  Overall feeling well.  Stoma is functioning a little bit.  Tolerating some diet, feels a little bit bloated.  Had trouble sleeping.    Objective:  Afebrile, vitals are stable  General: Awake alert and oriented, no distress  Abdomen is soft, mildly tender, incisions are in good order  Perineal wounds look quite good    Assessment and plan:  -Anal cancer, status post abdominal perineal resection and subsequent complex reconstruction  -Doing quite well to this point  -Drains and wound management per plastics  -Tolerating diet, weight improving stoma function, he is mobilizing well  -Discontinue IV fluids  -Good progress to this point    Walt Wick MD  General and Endoscopic Surgery  [facility]    [address], [address]  [place], [place], [address]  P: [phone]  F: [phone]

## 2021-04-11 PROCEDURE — 99024 POSTOP FOLLOW-UP VISIT: CPT | Performed by: SURGERY

## 2021-04-11 PROCEDURE — 25010000002 ENOXAPARIN PER 10 MG: Performed by: COLON & RECTAL SURGERY

## 2021-04-11 PROCEDURE — 25010000002 HYDROMORPHONE PER 4 MG: Performed by: COLON & RECTAL SURGERY

## 2021-04-11 RX ADMIN — BACITRACIN: 500 OINTMENT TOPICAL at 09:43

## 2021-04-11 RX ADMIN — ACETAMINOPHEN 1000 MG: 500 TABLET, FILM COATED ORAL at 02:09

## 2021-04-11 RX ADMIN — ALVIMOPAN 12 MG: 12 CAPSULE ORAL at 21:41

## 2021-04-11 RX ADMIN — ACETAMINOPHEN 1000 MG: 500 TABLET, FILM COATED ORAL at 09:42

## 2021-04-11 RX ADMIN — ZOLPIDEM TARTRATE 5 MG: 5 TABLET ORAL at 21:41

## 2021-04-11 RX ADMIN — ACETAMINOPHEN 1000 MG: 500 TABLET, FILM COATED ORAL at 21:41

## 2021-04-11 RX ADMIN — ACETAMINOPHEN 1000 MG: 500 TABLET, FILM COATED ORAL at 15:11

## 2021-04-11 RX ADMIN — METOPROLOL SUCCINATE 25 MG: 25 TABLET, EXTENDED RELEASE ORAL at 09:42

## 2021-04-11 RX ADMIN — ALVIMOPAN 12 MG: 12 CAPSULE ORAL at 09:42

## 2021-04-11 RX ADMIN — HYDROMORPHONE HYDROCHLORIDE 0.25 MG: 1 INJECTION, SOLUTION INTRAMUSCULAR; INTRAVENOUS; SUBCUTANEOUS at 21:41

## 2021-04-11 RX ADMIN — ENOXAPARIN SODIUM 40 MG: 40 INJECTION SUBCUTANEOUS at 09:43

## 2021-04-11 NOTE — PROGRESS NOTES
Postop day 5 after abdominal perineal resection/reconstruction perineum    Subjective:  Continues to feel well.  Tolerating diet better.  Stoma is functioning better.  Rested well last night.    Objective:  Afebrile with stable vitals  General: Awake and alert, no distress  Abdomen: Soft, mild tenderness, incisions in good order  Perineal wounds in good shape  Drains serous    Assessment and plan:  -Recurrent anal cancer, status post abdominal perineal resection with complex reconstruction  -Looks great to this point, stoma now functioning better and resting well  -Likely ready for discharge tomorrow    Walt Wick MD  General and Endoscopic Surgery  [facility]    [address], [address]  [place], [place], [address]  P: [phone]  F: [phone]

## 2021-04-11 NOTE — PLAN OF CARE
Goal Outcome Evaluation:  Plan of Care Reviewed With: patient  Progress: improving  Outcome Summary: No major issues over shift , VS stable, pt's pain minimal, incisions healing well, good output from ostomy, CHIARA drains collecting small volume of serosanguinous fluid, pt up several times walking, will continue to monitor

## 2021-04-11 NOTE — PLAN OF CARE
Goal Outcome Evaluation:  Plan of Care Reviewed With: patient  Progress: improving  Outcome Summary: VSS; ostomy = flatus(+), stool(+); CHIARA drains w/ minimal output - 40mL from L & 20mL from R; incisions are clean and seem to be healing well; no other changes this shift; will continue to monitor

## 2021-04-11 NOTE — PROGRESS NOTES
SUBJECTIVE:   Continuing to do well.  Pain well controlled and improved, complains only of some irritation at drain sites.  Tolerating diet, + bowel function, + ambulation and transfers.    OBJECTIVE:  Vitals:    04/11/21 0942   BP:    Pulse: 64   Resp:    Temp:    SpO2:      Physical Exam  Supine in bed, NAD  RRR  No dyspnea.  Left left gracilis flap incisions c/d/i, no fluid collection or erythemadrains with thin sanguinous drainage  Perineum flaps pink and viable, some maceration at posterior tip of flaps but no obvious breakdown, soft with no evidence of fluid collection or hematoma, no edge necrosis or breakdown  Drains with thin serosanguinous drainage, perineum drain output decreasing     PLAN:  POD4 bilateral gluteal V-Y advancement and gracilis flap to pelvic floor  - doing well  - continue supine position with laying, cannot sit on bottom for any reason, must transfer from supine to standing without sitting on edge of bed, ok to walk with short shuffling steps  - ok for shower  - drain care, drains to stay for now, output still above threshold for removal, will likely dc with drains  - wound care with bacitracin and ABD pads  - thigh pain consistent with meralgia paresthetica, no intervention needed  - DC needs: hospital bed for home, ambulance for transfer home as he cannot sit in a car

## 2021-04-12 VITALS
TEMPERATURE: 98.7 F | DIASTOLIC BLOOD PRESSURE: 93 MMHG | WEIGHT: 242.73 LBS | RESPIRATION RATE: 16 BRPM | HEIGHT: 72 IN | OXYGEN SATURATION: 96 % | HEART RATE: 60 BPM | SYSTOLIC BLOOD PRESSURE: 157 MMHG | BODY MASS INDEX: 32.88 KG/M2

## 2021-04-12 PROCEDURE — 25010000002 ENOXAPARIN PER 10 MG: Performed by: COLON & RECTAL SURGERY

## 2021-04-12 RX ORDER — GINSENG 100 MG
CAPSULE ORAL DAILY
Qty: 28 G | Refills: 2 | Status: SHIPPED | OUTPATIENT
Start: 2021-04-13

## 2021-04-12 RX ORDER — HYDROCHLOROTHIAZIDE 12.5 MG/1
12.5 TABLET ORAL DAILY
Status: DISCONTINUED | OUTPATIENT
Start: 2021-04-12 | End: 2021-04-12 | Stop reason: HOSPADM

## 2021-04-12 RX ORDER — ACETAMINOPHEN 500 MG
1000 TABLET ORAL EVERY 6 HOURS
Start: 2021-04-12 | End: 2021-04-17 | Stop reason: HOSPADM

## 2021-04-12 RX ADMIN — METOPROLOL SUCCINATE 25 MG: 25 TABLET, EXTENDED RELEASE ORAL at 08:28

## 2021-04-12 RX ADMIN — ACETAMINOPHEN 1000 MG: 500 TABLET, FILM COATED ORAL at 08:28

## 2021-04-12 RX ADMIN — HYDROCHLOROTHIAZIDE 12.5 MG: 12.5 CAPSULE ORAL at 12:09

## 2021-04-12 RX ADMIN — ENOXAPARIN SODIUM 40 MG: 40 INJECTION SUBCUTANEOUS at 08:28

## 2021-04-12 RX ADMIN — BACITRACIN: 500 OINTMENT TOPICAL at 08:28

## 2021-04-12 RX ADMIN — ACETAMINOPHEN 1000 MG: 500 TABLET, FILM COATED ORAL at 16:35

## 2021-04-12 RX ADMIN — ALVIMOPAN 12 MG: 12 CAPSULE ORAL at 08:28

## 2021-04-12 RX ADMIN — ACETAMINOPHEN 1000 MG: 500 TABLET, FILM COATED ORAL at 02:13

## 2021-04-12 NOTE — PROGRESS NOTES
"Physicians Statement of Medical Necessity for  Ambulance Transportation    GENERAL INFORMATION     Name: Darwin Sanchez  YOB: 1945  Medicare #: [id number]  Transport Date: 4/12/21 (Valid for round trips this date, or for scheduled repetitive trips for 60 days from the date signed below.)  Origin: [facility] Destination: [address]                                                                            [place] [facility]    Is the Patient's stay covered under Medicare Part A (PPS/DRG?)Yes  Closest appropriate facility? No, reason transport to more distant facility is required: Pt going home  If this a hosp-hosp transfer? No  Is this a hospice patient? No    MEDICAL NECESSITY QUESTIONAIRE    Ambulance Transportation is medically necessary only if other means of transportation are contraindicated or would be potentially harmful to the patient.  To meet this requirement, the patient must be either \"bed confined\" or suffer from a condition such that transport by means other than an ambulance is contraindicated by the patient's condition.  The following questions must be answered by the healthcare professional signing below for this form to be valid:     1) Describe the MEDICAL CONDITION (physical and/or mental) of this patient AT THE TIME OF AMBULANCE TRANSPORT that requires the patient to be transported in an ambulance, and why transport by other means is contraindicated by the patient's condition:   Past Medical History:   Diagnosis Date   • Anal cancer (CMS/HCC)     GROIN   • Anxiety    • Arthritis    • Carotid artery occlusion 07/06/2019   • Chest pain 02/2017   • Coronavirus infection 03/2020   • Dyspnea 04/2017   • ED (erectile dysfunction)    • Fish hook injury of finger 09/04/2017    RIGHT INDEX FINGER, SEEN AT [facility] ER   • Gout    • Gouty arthropathy 7/6/2019   • History of 2019 novel coronavirus disease (COVID-19) 03/2020    PER PCP-ANTIBODY TEST   • " "History of chemotherapy    • History of radiation therapy    • Hyperlipidemia 7/6/2019   • Hypertension    • Impaired fasting glucose 7/6/2019   • OA (osteoarthritis)     MULTIPLE SITES   • Osteoporosis    • Psoriasis    • 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   • Radiation dermatitis    • Rectal bleeding     WOUND, TO HAVE BIOPSIED PER DR OCASIO   • Rheumatoid arthritis (CMS/[facility])    • SCC (squamous cell carcinoma)     ANAL, S/P EXCISION AROUND 2013, HAD RADIATION AND CHEMO   • Uses hearing aid     BILATERAL      Past Surgical History:   Procedure Laterality Date   • ABDOMINAL PERINEAL RESECTION N/A 4/6/2021    Procedure: ABDOMINAL PERINEAL RESECTION LAPAROSCOPIC WITH DAVINCI ROBOT;  Surgeon: Makeda Ocasio MD;  Location: [facility];  Service: DaVinci;  Laterality: N/A;   • BREAST RECONSTRUCTION WITH LATISSIMUS MUSCLE FLAP AND IMPLANT INSERTION Bilateral 4/6/2021    Procedure: BILATERAL GLUTEAL ADVANCEMENT FLAP AND GRACILIS MUSCLE FLAP;  Surgeon: Mark Bardales MD;  [facility];  Service: Plastics;  Laterality: Bilateral;   • COLONOSCOPY  2018   • CONDYLOMA REMOVAL N/A 2/23/2021    Procedure: PERINEAL/ANAL LESION EXCISION/FULGURATION;  Surgeon: Makeda Ocasio MD;  Location: [facility];  Service: General;  Laterality: N/A;   • EXAM UNDER ANESTHESIA, RECTAL BIOPSY  04/02/2021   • KNEE ARTHROSCOPY Bilateral     OVER 35 YRS AGO, DR. CACERES   • RECTAL EXAMINATION UNDER ANESTHESIA N/A 03/09/2018    WITH BIOPSIES OF ANUS, DR. LINA ISIDRO AT [facility]   • SOFT TISSUE BIOPSY  04/02/2021    GROIN   • SQUAMOUS CELL CARCINOMA EXCISION N/A 09/23/2016    DIETER RECTAL AREA, DR. LINA ISIDRO AT [facility]   • TOTAL KNEE ARTHROPLASTY Right 10/17/2018    Procedure: TOTAL KNEE ARTHROPLASTY;  Surgeon: Cooper Ruby MD;  Location: [facility];  Service: Orthopedics      2) Is this patient \"bed confined\" as defined below?No    To be \"bed confined\" the patient must satisfy all three of the " following criteria:  (1) unable to get up from bed without assistance; AND (2) unable to ambulate;  AND (3) unable to sit in a chair or wheelchair.  3) Can this patient safely be transported by car or wheelchair van (I.e., may safely sit during transport, without an attendant or monitoring?)No   4. In addition to completing questions 1-3 above, please check any of the following conditions that apply*:          *Note: supporting documentation for any boxes checked must be maintained in the patient's medical records Unable to sit in a chair or wheelchair due to decubitus ulcers or other wounds and Other Pt had 1. Bilateral gluteal fasciocutaneous V-Y advancement flaps 150 sq cm each and per MD order can not sit for 6 weeks. Pt to be standing or flat when laying down only      SIGNATURE OF PHYSICIAN OR OTHER AUTHORIZED HEALTHCARE PROFESSIONAL    I certify that the above information is true and correct based on my evaluation of this patient, and represent that the patient requires transport by ambulance and that other forms of transport are contraindicated.  I understand that this information will be used by the Centers for Medicare and Medicaid Services (CMS) to support the determiniation of medical necessity for ambulance services, and I represent that I have personal knowledge of the patient's condition at the time of transport.       If this box is checked, I also certify that the patient is physically or mentally incapable of signing the ambulance service's claim form and that the institution with which I am affiliated has furnished care, services or assistance to the patient.  My signature below is made on behalf of the patient pursuant to 42 .36(b)(4). In accordance with 42 .37, the specific reason(s) that the patient is physically or mentally incapable of signing the claim for is as follows:     Signature of Physician or Healthcare Professional  Date/Time:        (For Scheduled repetitive  transport, this form is not valid for transports performed more than 60 days after this date).                                                                                                                                            --------------------------------------------------------------------------------------------  Printed Name and Credentials of Physician or Authorized Healthcare Professional     *Form must be signed by patient's attending physician for scheduled, repetitive transports,.  For non-repetitive ambulance transports, if unable to obtain the signature of the attending physician, any of the following may sign (please select below):     Physician  Clinical Nurse Specialist  Registered Nurse     Physician Assistant  Discharge Planner  Licensed Practical Nurse     Nurse Practitioner

## 2021-04-12 NOTE — NURSING NOTE
"   04/12/21 1005   Colostomy LLQ   Placement Date/Time: 04/06/21 1444   Inserted by: Dr. Ocasio  Hand Hygiene Completed: Yes  Location: LLQ   Stomal Appliance 2 piece;Changed  (2 1/4\" Gile)   Stoma Appearance round;moist;protruding above skin level  (maroon)   Peristomal Assessment Clean;Intact   Accessories/Skin Care cleansed with water   Stoma Function flatus;stool   Stool Color brown   Stool Consistency soft   Treatment Bag change   Output (mL) 100 mL     WOCN dept - son changed pouch today with very little prompts. He verbalizes comfort with process and does not feel that management will be difficult at home. Patient will have home health at home and assist of family. Will set up with secure start services today or tomorrow. At this time patient is restricted from sitting so family is emptying pouch. Discussed bag placement pointing downward for when patient starts emptying himself, for now is tilted to the side for others to empty. Demonstrated how to burp 2 piece for gas removal.    "

## 2021-04-12 NOTE — PROGRESS NOTES
SUBJECTIVE:   Continuing to do well.  Pain well controlled Tolerating diet, + bowel function, + ambulation and transfers. Some confusion last night.    OBJECTIVE:  Vitals:    04/12/21 0739   BP: 147/89   Pulse: 62   Resp: 16   Temp: 98.7 °F (37.1 °C)   SpO2:          PLAN:  Physical Exam  Supine in bed, NAD, oriented x3  RRR  No dyspnea.  Left left gracilis flap incisions c/d/i, no fluid collection or erythemadrains with thin sanguinous drainage  Perineum flaps pink and viable, some maceration at posterior tip of flaps but no obvious breakdown, soft with no evidence of fluid collection or hematoma, no edge necrosis or breakdown, mild maceration of posterior flap tip  Drains with thin serosanguinous drainage, perineum drain output decreasing     PLAN:  POD4 bilateral gluteal V-Y advancement and gracilis flap to pelvic floor  - doing well  - continue supine position with laying, cannot sit on bottom for any reason, must transfer from supine to standing without sitting on edge of bed, ok to walk with short shuffling steps  - ok for shower  - drain care, drains to stay for now, output still above threshold for removal, will dc home with drains  - wound care with bacitracin and ABD pads  - ok to dc antibiotics  - thigh pain consistent with meralgia paresthetica, no intervention needed  - DC needs: hospital bed for home, ambulance for transfer home as he cannot sit in a car  - follow up with me 4/19 or 4/21, ok to ride in car to post op visit, recline seat as much as possible

## 2021-04-12 NOTE — PLAN OF CARE
Goal Outcome Evaluation:     74 yo male POD 6 abdominal perineal resection with bilateral gluteal advancement flap and gracilis muscle flap. VS stable, 3L O2 NC at night, A&Ox4. Pt doing well, planning for possible discharge today. Ostomy positive for output, CHIARA drains putting out serosanguinous fluid, pt ambulating. Pain well controlled.

## 2021-04-12 NOTE — PROGRESS NOTES
Progress Note    Pod 6      S: positive flatus,  positive BM. positive ambulating. Pain controlled with eras    O:  Temp:  [98.7 °F (37.1 °C)-98.8 °F (37.1 °C)] 98.7 °F (37.1 °C)  Heart Rate:  [60-62] 60  Resp:  [16] 16  BP: (128-180)/() 157/93      Intake/Output Summary (Last 24 hours) at 4/12/2021 1616  Last data filed at 4/12/2021 1545  Gross per 24 hour   Intake 0 ml   Output 1220 ml   Net -1220 ml       Abd:   soft, non distended  Incision: clean, dry  Ostomy - dark on outside and swollen. Pink at os    Results from last 7 days   Lab Units 04/08/21  0446   WBC 10*3/mm3 6.96   HEMOGLOBIN g/dL 9.4*   HEMATOCRIT % 27.7*   PLATELETS 10*3/mm3 105*     Results from last 7 days   Lab Units 04/08/21  0446   SODIUM mmol/L 144   POTASSIUM mmol/L 4.0   CHLORIDE mmol/L 108*   CO2 mmol/L 30.0*   BUN mg/dL 20   CREATININE mg/dL 1.10   GLUCOSE mg/dL 93   CALCIUM mg/dL 7.4*   PHOSPHORUS mg/dL 2.1*       Results from last 7 days   Lab Units 04/07/21  0456   MAGNESIUM mg/dL 2.2         A/P: 75 y.o. male with s/p ABDOMINAL PERINEAL RESECTION LAPAROSCOPIC WITH DAVINCI ROBOT, BILATERAL GLUTEAL ADVANCEMENT FLAP AND GRACILIS MUSCLE FLAP         D/c home

## 2021-04-13 ENCOUNTER — READMISSION MANAGEMENT (OUTPATIENT)
Dept: CALL CENTER | Facility: HOSPITAL | Age: 76
End: 2021-04-13

## 2021-04-13 NOTE — OUTREACH NOTE
Prep Survey      Responses   [facility] facility patient discharged from?  [place]   Is LACE score < 7 ?  No   Emergency Room discharge w/ pulse ox?  No   Eligibility  Readm Mgmt   Discharge diagnosis  ABDOMINAL PERINEAL RESECTION LAPAROSCOPIC WITH DAVINCI ROBOT   Does the patient have one of the following disease processes/diagnoses(primary or secondary)?  General Surgery   Does the patient have Home health ordered?  Yes   What is the Home health agency?   [facility]   Is there a DME ordered?  Yes   What DME was ordered?  hospital bed from [facility]   Prep survey completed?  Yes          Jerica Rocha RN

## 2021-04-13 NOTE — PROGRESS NOTES
Case Management Discharge Note      Final Note: Home with family and [facility] via  [facility] at 1700. Hospital bed provided through [facility].    Provided Post Acute Provider List?: Refused  Refused Provider List Comment: Requested [facility] see at D/C as he has used them in the past.  Provided Post Acute Provider Quality & Resource List?: Refused    Selected Continued Care - Discharged on 4/12/2021 Admission date: 4/6/2021 - Discharge disposition: Home or Self Care    Destination    No services have been selected for the patient.              Durable Medical Equipment     Service Provider Selected Services Address Phone Fax Patient Preferred    [facility]  Durable Medical Equipment [address] [address] [phone] --          Dialysis/Infusion    No services have been selected for the patient.              Home Medical Care Coordination complete    Service Provider Selected Services Address Phone Fax Patient Preferred    [facility] [place]  Home Health Services [address] [id number] [phone] --          Therapy    No services have been selected for the patient.              Community Resources    No services have been selected for the patient.                  Transportation Services  Ambulance: [facility]    Final Discharge Disposition Code: 06 - home with home health care

## 2021-04-15 ENCOUNTER — APPOINTMENT (OUTPATIENT)
Dept: GENERAL RADIOLOGY | Facility: HOSPITAL | Age: 76
End: 2021-04-15

## 2021-04-15 ENCOUNTER — ANESTHESIA EVENT (OUTPATIENT)
Dept: PERIOP | Facility: HOSPITAL | Age: 76
End: 2021-04-15

## 2021-04-15 ENCOUNTER — ANESTHESIA (OUTPATIENT)
Dept: PERIOP | Facility: HOSPITAL | Age: 76
End: 2021-04-15

## 2021-04-15 ENCOUNTER — HOSPITAL ENCOUNTER (INPATIENT)
Facility: HOSPITAL | Age: 76
LOS: 2 days | Discharge: HOME HEALTH | End: 2021-04-17
Attending: EMERGENCY MEDICINE | Admitting: PLASTIC SURGERY

## 2021-04-15 DIAGNOSIS — R55 SYNCOPE AND COLLAPSE: Primary | ICD-10-CM

## 2021-04-15 DIAGNOSIS — C21.0 ANAL CANCER (HCC): ICD-10-CM

## 2021-04-15 DIAGNOSIS — T81.30XA WOUND DEHISCENCE: ICD-10-CM

## 2021-04-15 PROBLEM — N18.9 CKD (CHRONIC KIDNEY DISEASE): Status: ACTIVE | Noted: 2021-04-15

## 2021-04-15 PROBLEM — D64.9 ANEMIA: Status: ACTIVE | Noted: 2021-04-15

## 2021-04-15 LAB
ALBUMIN SERPL-MCNC: 3.6 G/DL (ref 3.5–5.2)
ALBUMIN/GLOB SERPL: 1.2 G/DL
ALP SERPL-CCNC: 69 U/L (ref 39–117)
ALT SERPL W P-5'-P-CCNC: 22 U/L (ref 1–41)
ANION GAP SERPL CALCULATED.3IONS-SCNC: 8.3 MMOL/L (ref 5–15)
AST SERPL-CCNC: 18 U/L (ref 1–40)
BASOPHILS # BLD AUTO: 0.05 10*3/MM3 (ref 0–0.2)
BASOPHILS NFR BLD AUTO: 0.5 % (ref 0–1.5)
BILIRUB SERPL-MCNC: 0.6 MG/DL (ref 0–1.2)
BUN SERPL-MCNC: 27 MG/DL (ref 8–23)
BUN/CREAT SERPL: 18 (ref 7–25)
CALCIUM SPEC-SCNC: 8.9 MG/DL (ref 8.6–10.5)
CHLORIDE SERPL-SCNC: 105 MMOL/L (ref 98–107)
CO2 SERPL-SCNC: 28.7 MMOL/L (ref 22–29)
CREAT SERPL-MCNC: 1.5 MG/DL (ref 0.76–1.27)
DEPRECATED RDW RBC AUTO: 42.6 FL (ref 37–54)
EOSINOPHIL # BLD AUTO: 0.08 10*3/MM3 (ref 0–0.4)
EOSINOPHIL NFR BLD AUTO: 0.7 % (ref 0.3–6.2)
ERYTHROCYTE [DISTWIDTH] IN BLOOD BY AUTOMATED COUNT: 12.8 % (ref 12.3–15.4)
GFR SERPL CREATININE-BSD FRML MDRD: 46 ML/MIN/1.73
GLOBULIN UR ELPH-MCNC: 3 GM/DL
GLUCOSE SERPL-MCNC: 113 MG/DL (ref 65–99)
HCT VFR BLD AUTO: 37.2 % (ref 37.5–51)
HGB BLD-MCNC: 12 G/DL (ref 13–17.7)
IMM GRANULOCYTES # BLD AUTO: 0.05 10*3/MM3 (ref 0–0.05)
IMM GRANULOCYTES NFR BLD AUTO: 0.5 % (ref 0–0.5)
LYMPHOCYTES # BLD AUTO: 0.73 10*3/MM3 (ref 0.7–3.1)
LYMPHOCYTES NFR BLD AUTO: 6.8 % (ref 19.6–45.3)
MCH RBC QN AUTO: 29.2 PG (ref 26.6–33)
MCHC RBC AUTO-ENTMCNC: 32.3 G/DL (ref 31.5–35.7)
MCV RBC AUTO: 90.5 FL (ref 79–97)
MONOCYTES # BLD AUTO: 0.77 10*3/MM3 (ref 0.1–0.9)
MONOCYTES NFR BLD AUTO: 7.1 % (ref 5–12)
NEUTROPHILS NFR BLD AUTO: 84.4 % (ref 42.7–76)
NEUTROPHILS NFR BLD AUTO: 9.09 10*3/MM3 (ref 1.7–7)
NRBC BLD AUTO-RTO: 0 /100 WBC (ref 0–0.2)
PLATELET # BLD AUTO: 218 10*3/MM3 (ref 140–450)
PMV BLD AUTO: 11 FL (ref 6–12)
POTASSIUM SERPL-SCNC: 3.6 MMOL/L (ref 3.5–5.2)
PROT SERPL-MCNC: 6.6 G/DL (ref 6–8.5)
QT INTERVAL: 413 MS
RBC # BLD AUTO: 4.11 10*6/MM3 (ref 4.14–5.8)
SARS-COV-2 RNA RESP QL NAA+PROBE: NOT DETECTED
SODIUM SERPL-SCNC: 142 MMOL/L (ref 136–145)
TROPONIN T SERPL-MCNC: 0.01 NG/ML (ref 0–0.03)
WBC # BLD AUTO: 10.77 10*3/MM3 (ref 3.4–10.8)

## 2021-04-15 PROCEDURE — 0HX9XZZ TRANSFER PERINEUM SKIN, EXTERNAL APPROACH: ICD-10-PCS | Performed by: PLASTIC SURGERY

## 2021-04-15 PROCEDURE — 93010 ELECTROCARDIOGRAM REPORT: CPT | Performed by: INTERNAL MEDICINE

## 2021-04-15 PROCEDURE — 93005 ELECTROCARDIOGRAM TRACING: CPT | Performed by: NURSE PRACTITIONER

## 2021-04-15 PROCEDURE — 25010000002 PROPOFOL 10 MG/ML EMULSION: Performed by: NURSE ANESTHETIST, CERTIFIED REGISTERED

## 2021-04-15 PROCEDURE — 85025 COMPLETE CBC W/AUTO DIFF WBC: CPT | Performed by: NURSE PRACTITIONER

## 2021-04-15 PROCEDURE — 25010000002 HYDROMORPHONE PER 4 MG: Performed by: NURSE PRACTITIONER

## 2021-04-15 PROCEDURE — U0003 INFECTIOUS AGENT DETECTION BY NUCLEIC ACID (DNA OR RNA); SEVERE ACUTE RESPIRATORY SYNDROME CORONAVIRUS 2 (SARS-COV-2) (CORONAVIRUS DISEASE [COVID-19]), AMPLIFIED PROBE TECHNIQUE, MAKING USE OF HIGH THROUGHPUT TECHNOLOGIES AS DESCRIBED BY CMS-2020-01-R: HCPCS | Performed by: INTERNAL MEDICINE

## 2021-04-15 PROCEDURE — 99284 EMERGENCY DEPT VISIT MOD MDM: CPT

## 2021-04-15 PROCEDURE — 80053 COMPREHEN METABOLIC PANEL: CPT | Performed by: NURSE PRACTITIONER

## 2021-04-15 PROCEDURE — 25010000003 CEFAZOLIN PER 500 MG: Performed by: PLASTIC SURGERY

## 2021-04-15 PROCEDURE — 84484 ASSAY OF TROPONIN QUANT: CPT | Performed by: NURSE PRACTITIONER

## 2021-04-15 PROCEDURE — U0005 INFEC AGEN DETEC AMPLI PROBE: HCPCS | Performed by: INTERNAL MEDICINE

## 2021-04-15 PROCEDURE — 25010000002 FENTANYL CITRATE (PF) 100 MCG/2ML SOLUTION: Performed by: NURSE ANESTHETIST, CERTIFIED REGISTERED

## 2021-04-15 PROCEDURE — 25010000002 FENTANYL CITRATE (PF) 100 MCG/2ML SOLUTION: Performed by: ANESTHESIOLOGY

## 2021-04-15 PROCEDURE — 25010000002 GENTAMICIN PER 80 MG: Performed by: PLASTIC SURGERY

## 2021-04-15 PROCEDURE — 25010000002 ONDANSETRON PER 1 MG: Performed by: NURSE PRACTITIONER

## 2021-04-15 PROCEDURE — 73130 X-RAY EXAM OF HAND: CPT

## 2021-04-15 PROCEDURE — 25010000002 ONDANSETRON PER 1 MG: Performed by: NURSE ANESTHETIST, CERTIFIED REGISTERED

## 2021-04-15 PROCEDURE — 25010000003 CEFAZOLIN IN DEXTROSE 2-4 GM/100ML-% SOLUTION: Performed by: NURSE ANESTHETIST, CERTIFIED REGISTERED

## 2021-04-15 PROCEDURE — 25010000002 MIDAZOLAM PER 1 MG: Performed by: ANESTHESIOLOGY

## 2021-04-15 PROCEDURE — 25010000002 HYDROMORPHONE PER 4 MG: Performed by: ANESTHESIOLOGY

## 2021-04-15 PROCEDURE — 25010000003 CEFAZOLIN IN DEXTROSE 2-4 GM/100ML-% SOLUTION: Performed by: PLASTIC SURGERY

## 2021-04-15 PROCEDURE — 25010000002 HYDROMORPHONE PER 4 MG: Performed by: INTERNAL MEDICINE

## 2021-04-15 PROCEDURE — 25010000002 DEXAMETHASONE PER 1 MG: Performed by: NURSE ANESTHETIST, CERTIFIED REGISTERED

## 2021-04-15 RX ORDER — SODIUM CHLORIDE 0.9 % (FLUSH) 0.9 %
3-10 SYRINGE (ML) INJECTION AS NEEDED
Status: DISCONTINUED | OUTPATIENT
Start: 2021-04-15 | End: 2021-04-15 | Stop reason: HOSPADM

## 2021-04-15 RX ORDER — SODIUM CHLORIDE 0.9 % (FLUSH) 0.9 %
10 SYRINGE (ML) INJECTION AS NEEDED
Status: DISCONTINUED | OUTPATIENT
Start: 2021-04-15 | End: 2021-04-17 | Stop reason: HOSPADM

## 2021-04-15 RX ORDER — CLOBETASOL PROPIONATE 0.5 MG/G
1 CREAM TOPICAL 2 TIMES DAILY PRN
Status: DISCONTINUED | OUTPATIENT
Start: 2021-04-15 | End: 2021-04-17 | Stop reason: HOSPADM

## 2021-04-15 RX ORDER — ACETAMINOPHEN 500 MG
500 TABLET ORAL EVERY 8 HOURS
Status: DISCONTINUED | OUTPATIENT
Start: 2021-04-15 | End: 2021-04-16

## 2021-04-15 RX ORDER — EPHEDRINE SULFATE 50 MG/ML
5 INJECTION, SOLUTION INTRAVENOUS ONCE AS NEEDED
Status: DISCONTINUED | OUTPATIENT
Start: 2021-04-15 | End: 2021-04-15 | Stop reason: HOSPADM

## 2021-04-15 RX ORDER — CEFAZOLIN SODIUM 2 G/100ML
2 INJECTION, SOLUTION INTRAVENOUS ONCE
Status: COMPLETED | OUTPATIENT
Start: 2021-04-15 | End: 2021-04-15

## 2021-04-15 RX ORDER — GINSENG 100 MG
CAPSULE ORAL AS NEEDED
Status: DISCONTINUED | OUTPATIENT
Start: 2021-04-15 | End: 2021-04-15 | Stop reason: HOSPADM

## 2021-04-15 RX ORDER — LOSARTAN POTASSIUM 100 MG/1
100 TABLET ORAL DAILY
Status: DISCONTINUED | OUTPATIENT
Start: 2021-04-15 | End: 2021-04-16

## 2021-04-15 RX ORDER — ZOLPIDEM TARTRATE 5 MG/1
5 TABLET ORAL NIGHTLY PRN
Status: DISCONTINUED | OUTPATIENT
Start: 2021-04-15 | End: 2021-04-17 | Stop reason: HOSPADM

## 2021-04-15 RX ORDER — SODIUM CHLORIDE, SODIUM LACTATE, POTASSIUM CHLORIDE, CALCIUM CHLORIDE 600; 310; 30; 20 MG/100ML; MG/100ML; MG/100ML; MG/100ML
9 INJECTION, SOLUTION INTRAVENOUS CONTINUOUS
Status: DISCONTINUED | OUTPATIENT
Start: 2021-04-15 | End: 2021-04-17 | Stop reason: HOSPADM

## 2021-04-15 RX ORDER — DEXAMETHASONE SODIUM PHOSPHATE 10 MG/ML
INJECTION INTRAMUSCULAR; INTRAVENOUS AS NEEDED
Status: DISCONTINUED | OUTPATIENT
Start: 2021-04-15 | End: 2021-04-15 | Stop reason: SURG

## 2021-04-15 RX ORDER — FAMOTIDINE 10 MG/ML
20 INJECTION, SOLUTION INTRAVENOUS ONCE
Status: COMPLETED | OUTPATIENT
Start: 2021-04-15 | End: 2021-04-15

## 2021-04-15 RX ORDER — ALLOPURINOL 100 MG/1
100 TABLET ORAL DAILY
Status: DISCONTINUED | OUTPATIENT
Start: 2021-04-15 | End: 2021-04-17 | Stop reason: HOSPADM

## 2021-04-15 RX ORDER — FENTANYL CITRATE 50 UG/ML
INJECTION, SOLUTION INTRAMUSCULAR; INTRAVENOUS AS NEEDED
Status: DISCONTINUED | OUTPATIENT
Start: 2021-04-15 | End: 2021-04-15 | Stop reason: SURG

## 2021-04-15 RX ORDER — FLUMAZENIL 0.1 MG/ML
0.2 INJECTION INTRAVENOUS AS NEEDED
Status: DISCONTINUED | OUTPATIENT
Start: 2021-04-15 | End: 2021-04-15 | Stop reason: HOSPADM

## 2021-04-15 RX ORDER — HYDROMORPHONE HYDROCHLORIDE 1 MG/ML
0.25 INJECTION, SOLUTION INTRAMUSCULAR; INTRAVENOUS; SUBCUTANEOUS
Status: DISCONTINUED | OUTPATIENT
Start: 2021-04-15 | End: 2021-04-17 | Stop reason: HOSPADM

## 2021-04-15 RX ORDER — ACETAMINOPHEN 500 MG
1000 TABLET ORAL EVERY 6 HOURS
Status: DISCONTINUED | OUTPATIENT
Start: 2021-04-15 | End: 2021-04-16

## 2021-04-15 RX ORDER — LIDOCAINE HYDROCHLORIDE 10 MG/ML
0.5 INJECTION, SOLUTION EPIDURAL; INFILTRATION; INTRACAUDAL; PERINEURAL ONCE AS NEEDED
Status: DISCONTINUED | OUTPATIENT
Start: 2021-04-15 | End: 2021-04-15 | Stop reason: HOSPADM

## 2021-04-15 RX ORDER — ONDANSETRON 2 MG/ML
4 INJECTION INTRAMUSCULAR; INTRAVENOUS EVERY 6 HOURS PRN
Status: DISCONTINUED | OUTPATIENT
Start: 2021-04-15 | End: 2021-04-17 | Stop reason: HOSPADM

## 2021-04-15 RX ORDER — SODIUM CHLORIDE 0.9 % (FLUSH) 0.9 %
10 SYRINGE (ML) INJECTION EVERY 12 HOURS SCHEDULED
Status: DISCONTINUED | OUTPATIENT
Start: 2021-04-15 | End: 2021-04-17 | Stop reason: HOSPADM

## 2021-04-15 RX ORDER — GINSENG 100 MG
CAPSULE ORAL DAILY
Status: DISCONTINUED | OUTPATIENT
Start: 2021-04-15 | End: 2021-04-16 | Stop reason: SDUPTHER

## 2021-04-15 RX ORDER — PROPOFOL 10 MG/ML
VIAL (ML) INTRAVENOUS AS NEEDED
Status: DISCONTINUED | OUTPATIENT
Start: 2021-04-15 | End: 2021-04-15 | Stop reason: SURG

## 2021-04-15 RX ORDER — EPHEDRINE SULFATE 50 MG/ML
INJECTION, SOLUTION INTRAVENOUS AS NEEDED
Status: DISCONTINUED | OUTPATIENT
Start: 2021-04-15 | End: 2021-04-15 | Stop reason: SURG

## 2021-04-15 RX ORDER — DIPHENHYDRAMINE HYDROCHLORIDE 50 MG/ML
12.5 INJECTION INTRAMUSCULAR; INTRAVENOUS
Status: DISCONTINUED | OUTPATIENT
Start: 2021-04-15 | End: 2021-04-15 | Stop reason: HOSPADM

## 2021-04-15 RX ORDER — MIDAZOLAM HYDROCHLORIDE 1 MG/ML
1 INJECTION INTRAMUSCULAR; INTRAVENOUS
Status: DISCONTINUED | OUTPATIENT
Start: 2021-04-15 | End: 2021-04-15 | Stop reason: HOSPADM

## 2021-04-15 RX ORDER — DIPHENHYDRAMINE HCL 25 MG
25 CAPSULE ORAL
Status: DISCONTINUED | OUTPATIENT
Start: 2021-04-15 | End: 2021-04-15 | Stop reason: HOSPADM

## 2021-04-15 RX ORDER — LABETALOL HYDROCHLORIDE 5 MG/ML
5 INJECTION, SOLUTION INTRAVENOUS
Status: DISCONTINUED | OUTPATIENT
Start: 2021-04-15 | End: 2021-04-15 | Stop reason: HOSPADM

## 2021-04-15 RX ORDER — PROMETHAZINE HYDROCHLORIDE 25 MG/1
25 TABLET ORAL ONCE AS NEEDED
Status: DISCONTINUED | OUTPATIENT
Start: 2021-04-15 | End: 2021-04-15 | Stop reason: HOSPADM

## 2021-04-15 RX ORDER — HYDROMORPHONE HYDROCHLORIDE 1 MG/ML
0.5 INJECTION, SOLUTION INTRAMUSCULAR; INTRAVENOUS; SUBCUTANEOUS ONCE
Status: COMPLETED | OUTPATIENT
Start: 2021-04-15 | End: 2021-04-15

## 2021-04-15 RX ORDER — ONDANSETRON 2 MG/ML
INJECTION INTRAMUSCULAR; INTRAVENOUS AS NEEDED
Status: DISCONTINUED | OUTPATIENT
Start: 2021-04-15 | End: 2021-04-15 | Stop reason: SURG

## 2021-04-15 RX ORDER — MIDAZOLAM HYDROCHLORIDE 1 MG/ML
0.5 INJECTION INTRAMUSCULAR; INTRAVENOUS
Status: DISCONTINUED | OUTPATIENT
Start: 2021-04-15 | End: 2021-04-15 | Stop reason: HOSPADM

## 2021-04-15 RX ORDER — ONDANSETRON 2 MG/ML
4 INJECTION INTRAMUSCULAR; INTRAVENOUS ONCE
Status: COMPLETED | OUTPATIENT
Start: 2021-04-15 | End: 2021-04-15

## 2021-04-15 RX ORDER — CEFAZOLIN SODIUM 2 G/100ML
INJECTION, SOLUTION INTRAVENOUS AS NEEDED
Status: DISCONTINUED | OUTPATIENT
Start: 2021-04-15 | End: 2021-04-15 | Stop reason: SURG

## 2021-04-15 RX ORDER — SODIUM CHLORIDE 0.9 % (FLUSH) 0.9 %
3 SYRINGE (ML) INJECTION EVERY 12 HOURS SCHEDULED
Status: DISCONTINUED | OUTPATIENT
Start: 2021-04-15 | End: 2021-04-15 | Stop reason: HOSPADM

## 2021-04-15 RX ORDER — LIDOCAINE HYDROCHLORIDE 20 MG/ML
INJECTION, SOLUTION INFILTRATION; PERINEURAL AS NEEDED
Status: DISCONTINUED | OUTPATIENT
Start: 2021-04-15 | End: 2021-04-15 | Stop reason: SURG

## 2021-04-15 RX ORDER — HYDROMORPHONE HYDROCHLORIDE 1 MG/ML
0.5 INJECTION, SOLUTION INTRAMUSCULAR; INTRAVENOUS; SUBCUTANEOUS
Status: DISCONTINUED | OUTPATIENT
Start: 2021-04-15 | End: 2021-04-15 | Stop reason: HOSPADM

## 2021-04-15 RX ORDER — NITROGLYCERIN 0.4 MG/1
0.4 TABLET SUBLINGUAL
Status: DISCONTINUED | OUTPATIENT
Start: 2021-04-15 | End: 2021-04-17 | Stop reason: HOSPADM

## 2021-04-15 RX ORDER — PROMETHAZINE HYDROCHLORIDE 25 MG/1
25 SUPPOSITORY RECTAL ONCE AS NEEDED
Status: DISCONTINUED | OUTPATIENT
Start: 2021-04-15 | End: 2021-04-15 | Stop reason: HOSPADM

## 2021-04-15 RX ORDER — CELECOXIB 100 MG/1
100 CAPSULE ORAL EVERY 12 HOURS SCHEDULED
Status: DISCONTINUED | OUTPATIENT
Start: 2021-04-15 | End: 2021-04-16

## 2021-04-15 RX ORDER — HYDROMORPHONE HYDROCHLORIDE 1 MG/ML
0.5 INJECTION, SOLUTION INTRAMUSCULAR; INTRAVENOUS; SUBCUTANEOUS
Status: DISCONTINUED | OUTPATIENT
Start: 2021-04-15 | End: 2021-04-16

## 2021-04-15 RX ORDER — COLCHICINE 0.6 MG/1
0.6 TABLET ORAL DAILY PRN
Status: DISCONTINUED | OUTPATIENT
Start: 2021-04-15 | End: 2021-04-17 | Stop reason: HOSPADM

## 2021-04-15 RX ORDER — OXYCODONE AND ACETAMINOPHEN 7.5; 325 MG/1; MG/1
1 TABLET ORAL ONCE AS NEEDED
Status: DISCONTINUED | OUTPATIENT
Start: 2021-04-15 | End: 2021-04-15 | Stop reason: HOSPADM

## 2021-04-15 RX ORDER — NALOXONE HCL 0.4 MG/ML
0.2 VIAL (ML) INJECTION AS NEEDED
Status: DISCONTINUED | OUTPATIENT
Start: 2021-04-15 | End: 2021-04-15 | Stop reason: HOSPADM

## 2021-04-15 RX ORDER — FENTANYL CITRATE 50 UG/ML
50 INJECTION, SOLUTION INTRAMUSCULAR; INTRAVENOUS
Status: DISCONTINUED | OUTPATIENT
Start: 2021-04-15 | End: 2021-04-15 | Stop reason: HOSPADM

## 2021-04-15 RX ORDER — METOPROLOL SUCCINATE 25 MG/1
25 TABLET, EXTENDED RELEASE ORAL DAILY
Status: DISCONTINUED | OUTPATIENT
Start: 2021-04-15 | End: 2021-04-16

## 2021-04-15 RX ORDER — GABAPENTIN 300 MG/1
300 CAPSULE ORAL EVERY 8 HOURS SCHEDULED
Status: DISCONTINUED | OUTPATIENT
Start: 2021-04-15 | End: 2021-04-16

## 2021-04-15 RX ORDER — ATORVASTATIN CALCIUM 20 MG/1
40 TABLET, FILM COATED ORAL DAILY
Status: DISCONTINUED | OUTPATIENT
Start: 2021-04-15 | End: 2021-04-17 | Stop reason: HOSPADM

## 2021-04-15 RX ORDER — ONDANSETRON 2 MG/ML
4 INJECTION INTRAMUSCULAR; INTRAVENOUS ONCE AS NEEDED
Status: DISCONTINUED | OUTPATIENT
Start: 2021-04-15 | End: 2021-04-15 | Stop reason: HOSPADM

## 2021-04-15 RX ORDER — HYDRALAZINE HYDROCHLORIDE 20 MG/ML
5 INJECTION INTRAMUSCULAR; INTRAVENOUS
Status: DISCONTINUED | OUTPATIENT
Start: 2021-04-15 | End: 2021-04-15 | Stop reason: HOSPADM

## 2021-04-15 RX ORDER — HYDROCODONE BITARTRATE AND ACETAMINOPHEN 7.5; 325 MG/1; MG/1
1 TABLET ORAL ONCE AS NEEDED
Status: CANCELLED | OUTPATIENT
Start: 2021-04-15

## 2021-04-15 RX ORDER — HYDROCHLOROTHIAZIDE 12.5 MG/1
12.5 TABLET ORAL DAILY
Status: DISCONTINUED | OUTPATIENT
Start: 2021-04-15 | End: 2021-04-16

## 2021-04-15 RX ADMIN — FENTANYL CITRATE 50 MCG: 50 INJECTION, SOLUTION INTRAMUSCULAR; INTRAVENOUS at 18:17

## 2021-04-15 RX ADMIN — HYDROMORPHONE HYDROCHLORIDE 0.5 MG: 1 INJECTION, SOLUTION INTRAMUSCULAR; INTRAVENOUS; SUBCUTANEOUS at 15:30

## 2021-04-15 RX ADMIN — SODIUM CHLORIDE 1000 ML: 9 INJECTION, SOLUTION INTRAVENOUS at 12:48

## 2021-04-15 RX ADMIN — CEFAZOLIN SODIUM 2 G: 2 INJECTION, SOLUTION INTRAVENOUS at 18:59

## 2021-04-15 RX ADMIN — CEFAZOLIN SODIUM 2 G: 2 INJECTION, SOLUTION INTRAVENOUS at 19:31

## 2021-04-15 RX ADMIN — MIDAZOLAM 1 MG: 1 INJECTION INTRAMUSCULAR; INTRAVENOUS at 18:17

## 2021-04-15 RX ADMIN — HYDROMORPHONE HYDROCHLORIDE 0.5 MG: 1 INJECTION, SOLUTION INTRAMUSCULAR; INTRAVENOUS; SUBCUTANEOUS at 21:15

## 2021-04-15 RX ADMIN — FENTANYL CITRATE 50 MCG: 50 INJECTION INTRAMUSCULAR; INTRAVENOUS at 19:25

## 2021-04-15 RX ADMIN — FENTANYL CITRATE 50 MCG: 50 INJECTION INTRAMUSCULAR; INTRAVENOUS at 19:59

## 2021-04-15 RX ADMIN — EPHEDRINE SULFATE 10 MG: 50 INJECTION INTRAVENOUS at 19:52

## 2021-04-15 RX ADMIN — HYDROMORPHONE HYDROCHLORIDE 0.5 MG: 1 INJECTION, SOLUTION INTRAMUSCULAR; INTRAVENOUS; SUBCUTANEOUS at 12:47

## 2021-04-15 RX ADMIN — ONDANSETRON 4 MG: 2 INJECTION INTRAMUSCULAR; INTRAVENOUS at 19:54

## 2021-04-15 RX ADMIN — SODIUM CHLORIDE, POTASSIUM CHLORIDE, SODIUM LACTATE AND CALCIUM CHLORIDE 9 ML/HR: 600; 310; 30; 20 INJECTION, SOLUTION INTRAVENOUS at 18:16

## 2021-04-15 RX ADMIN — FENTANYL CITRATE 50 MCG: 50 INJECTION, SOLUTION INTRAMUSCULAR; INTRAVENOUS at 21:15

## 2021-04-15 RX ADMIN — HYDROMORPHONE HYDROCHLORIDE 0.5 MG: 1 INJECTION, SOLUTION INTRAMUSCULAR; INTRAVENOUS; SUBCUTANEOUS at 20:51

## 2021-04-15 RX ADMIN — ONDANSETRON 4 MG: 2 INJECTION INTRAMUSCULAR; INTRAVENOUS at 12:47

## 2021-04-15 RX ADMIN — LIDOCAINE HYDROCHLORIDE 100 MG: 20 INJECTION, SOLUTION INFILTRATION; PERINEURAL at 19:16

## 2021-04-15 RX ADMIN — FAMOTIDINE 20 MG: 10 INJECTION INTRAVENOUS at 18:17

## 2021-04-15 RX ADMIN — DEXAMETHASONE SODIUM PHOSPHATE 8 MG: 10 INJECTION INTRAMUSCULAR; INTRAVENOUS at 19:24

## 2021-04-15 RX ADMIN — PROPOFOL 150 MG: 10 INJECTION, EMULSION INTRAVENOUS at 19:16

## 2021-04-15 NOTE — ANESTHESIA PREPROCEDURE EVALUATION
Anesthesia Evaluation     Patient summary reviewed and Nursing notes reviewed   no history of anesthetic complications:  NPO Solid Status: > 6 hours  NPO Liquid Status: > 6 hours           Airway   Mallampati: II  TM distance: >3 FB  Neck ROM: full  no difficulty expected and No difficulty expected  Dental - normal exam     Pulmonary - normal exam    breath sounds clear to auscultation  (+) shortness of breath,   (-) rhonchi, decreased breath sounds, wheezes, rales, stridor  Cardiovascular - normal exam    NYHA Classification: I  ECG reviewed  Rhythm: regular  Rate: normal    (+) hypertension 2 medications or greater, CAD, PVD, hyperlipidemia,  carotid artery disease  (-) angina, murmur, weak pulses, friction rub, systolic click, carotid bruits, JVD, peripheral edema      Neuro/Psych  (+) psychiatric history Anxiety,     GI/Hepatic/Renal/Endo    (+) obesity,  GI bleeding lower resolved, renal disease CRI,     Musculoskeletal     Abdominal  - normal exam    Abdomen: soft.   Substance History - negative use     OB/GYN negative ob/gyn ROS         Other   arthritis,    history of cancer active                      Anesthesia Plan    ASA 3     general with block     intravenous induction     Anesthetic plan, all risks, benefits, and alternatives have been provided, discussed and informed consent has been obtained with: patient.

## 2021-04-15 NOTE — ANESTHESIA PROCEDURE NOTES
Airway  Urgency: elective    Date/Time: 4/15/2021 7:18 PM    General Information and Staff    Patient location during procedure: OR  Anesthesiologist: Roger Quiñonez MD  CRNA: Марина Jeffrey CRNA    Indications and Patient Condition    Preoxygenated: yes  Mask difficulty assessment: 0 - not attempted    Final Airway Details  Final airway type: supraglottic airway      Successful airway: classic  Size 5    Number of attempts at approach: 1  Assessment: lips, teeth, and gum same as pre-op    Additional Comments  Pt to OR and positioned self to OR table. Monitors applied. PreO2: SIVI and easy insertion LMA. ETCO2 +, Equal and bilateral chest rise

## 2021-04-16 ENCOUNTER — READMISSION MANAGEMENT (OUTPATIENT)
Dept: CALL CENTER | Facility: HOSPITAL | Age: 76
End: 2021-04-16

## 2021-04-16 LAB
ALBUMIN SERPL-MCNC: 3.2 G/DL (ref 3.5–5.2)
ALBUMIN/GLOB SERPL: 1.1 G/DL
ALP SERPL-CCNC: 62 U/L (ref 39–117)
ALT SERPL W P-5'-P-CCNC: 18 U/L (ref 1–41)
ANION GAP SERPL CALCULATED.3IONS-SCNC: 8 MMOL/L (ref 5–15)
AST SERPL-CCNC: 12 U/L (ref 1–40)
BASOPHILS # BLD AUTO: 0.01 10*3/MM3 (ref 0–0.2)
BASOPHILS NFR BLD AUTO: 0.1 % (ref 0–1.5)
BILIRUB SERPL-MCNC: 0.5 MG/DL (ref 0–1.2)
BUN SERPL-MCNC: 28 MG/DL (ref 8–23)
BUN/CREAT SERPL: 22.6 (ref 7–25)
CALCIUM SPEC-SCNC: 8.4 MG/DL (ref 8.6–10.5)
CHLORIDE SERPL-SCNC: 105 MMOL/L (ref 98–107)
CO2 SERPL-SCNC: 27 MMOL/L (ref 22–29)
CREAT SERPL-MCNC: 1.24 MG/DL (ref 0.76–1.27)
DEPRECATED RDW RBC AUTO: 40.6 FL (ref 37–54)
EOSINOPHIL # BLD AUTO: 0 10*3/MM3 (ref 0–0.4)
EOSINOPHIL NFR BLD AUTO: 0 % (ref 0.3–6.2)
ERYTHROCYTE [DISTWIDTH] IN BLOOD BY AUTOMATED COUNT: 12.4 % (ref 12.3–15.4)
GFR SERPL CREATININE-BSD FRML MDRD: 57 ML/MIN/1.73
GLOBULIN UR ELPH-MCNC: 3 GM/DL
GLUCOSE SERPL-MCNC: 155 MG/DL (ref 65–99)
HCT VFR BLD AUTO: 32.6 % (ref 37.5–51)
HGB BLD-MCNC: 10.4 G/DL (ref 13–17.7)
IMM GRANULOCYTES # BLD AUTO: 0.04 10*3/MM3 (ref 0–0.05)
IMM GRANULOCYTES NFR BLD AUTO: 0.5 % (ref 0–0.5)
LYMPHOCYTES # BLD AUTO: 0.55 10*3/MM3 (ref 0.7–3.1)
LYMPHOCYTES NFR BLD AUTO: 6.4 % (ref 19.6–45.3)
MCH RBC QN AUTO: 28.2 PG (ref 26.6–33)
MCHC RBC AUTO-ENTMCNC: 31.9 G/DL (ref 31.5–35.7)
MCV RBC AUTO: 88.3 FL (ref 79–97)
MONOCYTES # BLD AUTO: 0.28 10*3/MM3 (ref 0.1–0.9)
MONOCYTES NFR BLD AUTO: 3.3 % (ref 5–12)
NEUTROPHILS NFR BLD AUTO: 7.67 10*3/MM3 (ref 1.7–7)
NEUTROPHILS NFR BLD AUTO: 89.7 % (ref 42.7–76)
NRBC BLD AUTO-RTO: 0 /100 WBC (ref 0–0.2)
PLATELET # BLD AUTO: 212 10*3/MM3 (ref 140–450)
PMV BLD AUTO: 11 FL (ref 6–12)
POTASSIUM SERPL-SCNC: 4.1 MMOL/L (ref 3.5–5.2)
PROT SERPL-MCNC: 6.2 G/DL (ref 6–8.5)
RBC # BLD AUTO: 3.69 10*6/MM3 (ref 4.14–5.8)
SODIUM SERPL-SCNC: 140 MMOL/L (ref 136–145)
WBC # BLD AUTO: 8.55 10*3/MM3 (ref 3.4–10.8)

## 2021-04-16 PROCEDURE — 85025 COMPLETE CBC W/AUTO DIFF WBC: CPT | Performed by: PLASTIC SURGERY

## 2021-04-16 PROCEDURE — 80053 COMPREHEN METABOLIC PANEL: CPT | Performed by: PLASTIC SURGERY

## 2021-04-16 PROCEDURE — 97162 PT EVAL MOD COMPLEX 30 MIN: CPT

## 2021-04-16 PROCEDURE — 99222 1ST HOSP IP/OBS MODERATE 55: CPT | Performed by: COLON & RECTAL SURGERY

## 2021-04-16 PROCEDURE — 97165 OT EVAL LOW COMPLEX 30 MIN: CPT

## 2021-04-16 PROCEDURE — 97535 SELF CARE MNGMENT TRAINING: CPT

## 2021-04-16 PROCEDURE — 97110 THERAPEUTIC EXERCISES: CPT

## 2021-04-16 RX ORDER — CELECOXIB 100 MG/1
100 CAPSULE ORAL EVERY 12 HOURS SCHEDULED
Status: DISCONTINUED | OUTPATIENT
Start: 2021-04-16 | End: 2021-04-17 | Stop reason: HOSPADM

## 2021-04-16 RX ORDER — METOPROLOL SUCCINATE 25 MG/1
12.5 TABLET, EXTENDED RELEASE ORAL DAILY
Status: DISCONTINUED | OUTPATIENT
Start: 2021-04-17 | End: 2021-04-17 | Stop reason: HOSPADM

## 2021-04-16 RX ORDER — SODIUM CHLORIDE 9 MG/ML
100 INJECTION, SOLUTION INTRAVENOUS CONTINUOUS
Status: ACTIVE | OUTPATIENT
Start: 2021-04-16 | End: 2021-04-16

## 2021-04-16 RX ORDER — GINSENG 100 MG
CAPSULE ORAL DAILY
Status: DISCONTINUED | OUTPATIENT
Start: 2021-04-16 | End: 2021-04-17 | Stop reason: HOSPADM

## 2021-04-16 RX ORDER — TRAMADOL HYDROCHLORIDE 50 MG/1
100 TABLET ORAL EVERY 6 HOURS PRN
Status: DISCONTINUED | OUTPATIENT
Start: 2021-04-16 | End: 2021-04-17 | Stop reason: HOSPADM

## 2021-04-16 RX ORDER — TRAMADOL HYDROCHLORIDE 50 MG/1
50 TABLET ORAL EVERY 6 HOURS PRN
Status: DISCONTINUED | OUTPATIENT
Start: 2021-04-16 | End: 2021-04-16

## 2021-04-16 RX ORDER — HYDROCODONE BITARTRATE AND ACETAMINOPHEN 5; 325 MG/1; MG/1
1 TABLET ORAL EVERY 6 HOURS PRN
Status: DISCONTINUED | OUTPATIENT
Start: 2021-04-16 | End: 2021-04-16

## 2021-04-16 RX ORDER — ACETAMINOPHEN 325 MG/1
650 TABLET ORAL EVERY 6 HOURS
Status: DISCONTINUED | OUTPATIENT
Start: 2021-04-16 | End: 2021-04-17 | Stop reason: HOSPADM

## 2021-04-16 RX ORDER — ACETAMINOPHEN 325 MG/1
650 TABLET ORAL EVERY 6 HOURS PRN
Status: DISCONTINUED | OUTPATIENT
Start: 2021-04-16 | End: 2021-04-16

## 2021-04-16 RX ORDER — GABAPENTIN 300 MG/1
300 CAPSULE ORAL EVERY 12 HOURS SCHEDULED
Status: DISCONTINUED | OUTPATIENT
Start: 2021-04-16 | End: 2021-04-16

## 2021-04-16 RX ADMIN — GABAPENTIN 300 MG: 300 CAPSULE ORAL at 00:32

## 2021-04-16 RX ADMIN — METOPROLOL SUCCINATE 12.5 MG: 25 TABLET, EXTENDED RELEASE ORAL at 09:09

## 2021-04-16 RX ADMIN — CELECOXIB 100 MG: 100 CAPSULE ORAL at 20:18

## 2021-04-16 RX ADMIN — SODIUM CHLORIDE, PRESERVATIVE FREE 10 ML: 5 INJECTION INTRAVENOUS at 21:17

## 2021-04-16 RX ADMIN — CELECOXIB 100 MG: 100 CAPSULE ORAL at 00:32

## 2021-04-16 RX ADMIN — ACETAMINOPHEN 1000 MG: 500 TABLET ORAL at 07:03

## 2021-04-16 RX ADMIN — ACETAMINOPHEN 1000 MG: 500 TABLET ORAL at 00:32

## 2021-04-16 RX ADMIN — ZOLPIDEM TARTRATE 5 MG: 5 TABLET ORAL at 20:19

## 2021-04-16 RX ADMIN — CELECOXIB 100 MG: 100 CAPSULE ORAL at 09:11

## 2021-04-16 RX ADMIN — ACETAMINOPHEN 650 MG: 325 TABLET ORAL at 18:40

## 2021-04-16 RX ADMIN — TRAMADOL HYDROCHLORIDE 100 MG: 50 TABLET, FILM COATED ORAL at 20:19

## 2021-04-16 RX ADMIN — TRAMADOL HYDROCHLORIDE 100 MG: 50 TABLET, FILM COATED ORAL at 13:29

## 2021-04-16 RX ADMIN — ALLOPURINOL 100 MG: 100 TABLET ORAL at 09:09

## 2021-04-16 RX ADMIN — ATORVASTATIN CALCIUM 40 MG: 20 TABLET, FILM COATED ORAL at 09:09

## 2021-04-16 RX ADMIN — GABAPENTIN 300 MG: 300 CAPSULE ORAL at 07:03

## 2021-04-16 RX ADMIN — BACITRACIN: 500 OINTMENT TOPICAL at 16:10

## 2021-04-16 RX ADMIN — SODIUM CHLORIDE 100 ML/HR: 9 INJECTION, SOLUTION INTRAVENOUS at 13:29

## 2021-04-16 NOTE — ANESTHESIA POSTPROCEDURE EVALUATION
"Patient: Darwin Sanchez    Procedure Summary     Date: 04/15/21 Room / Location: [facility] OR [address] [place] [address] MAIN OR    Anesthesia Start: 1908 Anesthesia Stop: 2033    Procedure: PERINEUM WOUND DEBRIDEMENT AND COMPLEX CLOSURE (N/A Breast) Diagnosis:     Surgeons: Mark Bardales MD Provider: Dinesh Heath MD    Anesthesia Type: general with block ASA Status: 3          Anesthesia Type: general with block    Vitals  Vitals Value Taken Time   /82 04/15/21 2030   Temp 37.2 °C (98.9 °F) 04/15/21 2022   Pulse 69 04/15/21 2039   Resp 16 04/15/21 2030   SpO2 94 % 04/15/21 2039   Vitals shown include unvalidated device data.        Post Anesthesia Care and Evaluation    Patient location during evaluation: bedside  Patient participation: complete - patient participated  Level of consciousness: awake and alert  Pain management: adequate  Airway patency: patent  Anesthetic complications: No anesthetic complications    Cardiovascular status: acceptable  Respiratory status: acceptable  Hydration status: acceptable    Comments: /82 (BP Location: Left arm, Patient Position: Lying)   Pulse 73   Temp 37.2 °C (98.9 °F) (Oral)   Resp 16   Ht 182.9 cm (72\")   Wt 109 kg (240 lb)   SpO2 95%   BMI 32.55 kg/m²       "

## 2021-04-16 NOTE — OUTREACH NOTE
General Surgery Week 1 Survey      Responses   [facility] patient discharged from?  [place]   Does the patient have one of the following disease processes/diagnoses(primary or secondary)?  General Surgery   Week 1 attempt successful?  No   Unsuccessful attempts  Attempt 1   Revoke  Readmitted          Jennifer Jennings RN

## 2021-04-17 VITALS
RESPIRATION RATE: 16 BRPM | SYSTOLIC BLOOD PRESSURE: 140 MMHG | BODY MASS INDEX: 32.51 KG/M2 | HEIGHT: 72 IN | DIASTOLIC BLOOD PRESSURE: 78 MMHG | TEMPERATURE: 97 F | HEART RATE: 56 BPM | OXYGEN SATURATION: 96 % | WEIGHT: 240 LBS

## 2021-04-17 LAB
ANION GAP SERPL CALCULATED.3IONS-SCNC: 6 MMOL/L (ref 5–15)
BUN SERPL-MCNC: 23 MG/DL (ref 8–23)
BUN/CREAT SERPL: 21.9 (ref 7–25)
CALCIUM SPEC-SCNC: 8.2 MG/DL (ref 8.6–10.5)
CHLORIDE SERPL-SCNC: 108 MMOL/L (ref 98–107)
CO2 SERPL-SCNC: 29 MMOL/L (ref 22–29)
CREAT SERPL-MCNC: 1.05 MG/DL (ref 0.76–1.27)
DEPRECATED RDW RBC AUTO: 43 FL (ref 37–54)
ERYTHROCYTE [DISTWIDTH] IN BLOOD BY AUTOMATED COUNT: 12.8 % (ref 12.3–15.4)
GFR SERPL CREATININE-BSD FRML MDRD: 69 ML/MIN/1.73
GLUCOSE SERPL-MCNC: 98 MG/DL (ref 65–99)
HCT VFR BLD AUTO: 31.7 % (ref 37.5–51)
HGB BLD-MCNC: 10 G/DL (ref 13–17.7)
MCH RBC QN AUTO: 29.1 PG (ref 26.6–33)
MCHC RBC AUTO-ENTMCNC: 31.5 G/DL (ref 31.5–35.7)
MCV RBC AUTO: 92.2 FL (ref 79–97)
PLATELET # BLD AUTO: 200 10*3/MM3 (ref 140–450)
PMV BLD AUTO: 11 FL (ref 6–12)
POTASSIUM SERPL-SCNC: 4.3 MMOL/L (ref 3.5–5.2)
RBC # BLD AUTO: 3.44 10*6/MM3 (ref 4.14–5.8)
SODIUM SERPL-SCNC: 143 MMOL/L (ref 136–145)
WBC # BLD AUTO: 8.56 10*3/MM3 (ref 3.4–10.8)

## 2021-04-17 PROCEDURE — 97116 GAIT TRAINING THERAPY: CPT

## 2021-04-17 PROCEDURE — 80048 BASIC METABOLIC PNL TOTAL CA: CPT | Performed by: INTERNAL MEDICINE

## 2021-04-17 PROCEDURE — 85027 COMPLETE CBC AUTOMATED: CPT | Performed by: INTERNAL MEDICINE

## 2021-04-17 RX ORDER — METOPROLOL SUCCINATE 25 MG/1
12.5 TABLET, EXTENDED RELEASE ORAL DAILY
Qty: 15 TABLET | Refills: 0 | Status: SHIPPED | OUTPATIENT
Start: 2021-04-18 | End: 2021-07-19

## 2021-04-17 RX ORDER — TRAMADOL HYDROCHLORIDE 50 MG/1
100 TABLET ORAL EVERY 6 HOURS PRN
Qty: 20 TABLET | Refills: 0 | Status: SHIPPED | OUTPATIENT
Start: 2021-04-17 | End: 2021-05-21

## 2021-04-17 RX ORDER — ACETAMINOPHEN 325 MG/1
650 TABLET ORAL EVERY 6 HOURS PRN
Start: 2021-04-17 | End: 2021-10-22

## 2021-04-17 RX ADMIN — ATORVASTATIN CALCIUM 40 MG: 20 TABLET, FILM COATED ORAL at 08:24

## 2021-04-17 RX ADMIN — METOPROLOL SUCCINATE 12.5 MG: 25 TABLET, EXTENDED RELEASE ORAL at 08:24

## 2021-04-17 RX ADMIN — BACITRACIN: 500 OINTMENT TOPICAL at 10:39

## 2021-04-17 RX ADMIN — ACETAMINOPHEN 650 MG: 325 TABLET ORAL at 06:29

## 2021-04-17 RX ADMIN — SODIUM CHLORIDE, PRESERVATIVE FREE 10 ML: 5 INJECTION INTRAVENOUS at 08:20

## 2021-04-17 RX ADMIN — CELECOXIB 100 MG: 100 CAPSULE ORAL at 08:19

## 2021-04-17 RX ADMIN — TRAMADOL HYDROCHLORIDE 100 MG: 50 TABLET, FILM COATED ORAL at 03:15

## 2021-04-18 ENCOUNTER — READMISSION MANAGEMENT (OUTPATIENT)
Dept: CALL CENTER | Facility: HOSPITAL | Age: 76
End: 2021-04-18

## 2021-04-18 NOTE — OUTREACH NOTE
Prep Survey      Responses   [facility] facility patient discharged from?  [place]   Is LACE score < 7 ?  No   Emergency Room discharge w/ pulse ox?  No   Eligibility  Readm Mgmt   Discharge diagnosis  Syncope and collapse    Does the patient have one of the following disease processes/diagnoses(primary or secondary)?  General Surgery   Does the patient have Home health ordered?  Yes   What is the Home health agency?   [facility]   Is there a DME ordered?  Yes   What DME was ordered?  [employer] per [facility]    Prep survey completed?  Yes          Jeannette Taylor RN

## 2021-04-20 ENCOUNTER — READMISSION MANAGEMENT (OUTPATIENT)
Dept: CALL CENTER | Facility: HOSPITAL | Age: 76
End: 2021-04-20

## 2021-04-20 PROBLEM — C21.0: Status: ACTIVE | Noted: 2021-03-02

## 2021-04-20 NOTE — OUTREACH NOTE
General Surgery Week 1 Survey      Responses   [facility] patient discharged from?  [place]   Does the patient have one of the following disease processes/diagnoses(primary or secondary)?  General Surgery   Week 1 attempt successful?  Yes   Call start time  1705   Call end time  1708   Discharge diagnosis  Syncope and collapse    Meds reviewed with patient/caregiver?  Yes   Is the patient having any side effects they believe may be caused by any medication additions or changes?  No   Does the patient have all medications related to this admission filled (includes all antibiotics, pain medications, etc.)  Yes   Is the patient taking all medications as directed (includes completed medication regime)?  Yes   Does the patient have a follow up appointment scheduled with their surgeon?  Yes   Has the patient kept scheduled appointments due by today?  N/A   Comments  Patient has a followup with surgeon on 4/26/2021   What is the Home health agency?   [facility]   What DME was ordered?  [employer] per [facility]    Psychosocial issues?  No   Did the patient receive a copy of their discharge instructions?  Yes   Nursing interventions  Reviewed instructions with patient   What is the patient's perception of their health status since discharge?  Improving   Nursing interventions  Nurse provided patient education   Is the patient /caregiver able to teach back basic post-op care?  Drive as instructed by MD in discharge instructions, Take showers only when approved by MD-sponge bathe until then, No tub bath, swimming, or hot tub until instructed by MD, Keep incision areas clean,dry and protected, Lifting as instructed by MD in discharge instructions   Is the patient/caregiver able to teach back signs and symptoms of incisional infection?  Increased redness, swelling or pain at the incisonal site, Increased drainage or bleeding, Incisional warmth, Pus or odor from incision, Fever   Is the patient/caregiver able to teach  back steps to recovery at home?  Set small, achievable goals for return to baseline health, Rest and rebuild strength, gradually increase activity, Make a list of questions for surgeon's appointment   If the patient is a current smoker, are they able to teach back resources for cessation?  Not a smoker   Is the patient/caregiver able to teach back the hierarchy of who to call/visit for symptoms/problems? PCP, Specialist, Home health nurse, Urgent Care, ED, 911  Yes   Week 1 call completed?  Yes          Blue Almonte RN

## 2021-04-21 NOTE — DISCHARGE SUMMARY
Date of Admission: 4/6/2021  Date of Discharge:  4/12/2021    Presenting Problem/History of Present Illness  Anal cancer (CMS/HCC) [C21.0]     Discharge Diagnosis:   Past Medical History:   Diagnosis Date   • Anal cancer (CMS/HCC)     GROIN   • Anxiety    • Arthritis    • CAD (coronary artery disease)    • Carotid artery occlusion 07/06/2019   • Chest pain 02/2017   • Coronavirus infection 03/2020   • Dyspnea 04/2017   • ED (erectile dysfunction)    • Fish hook injury of finger 09/04/2017    RIGHT INDEX FINGER, SEEN AT [facility] ER   • Gout    • Gouty arthropathy 7/6/2019   • History of 2019 novel coronavirus disease (COVID-19) 03/2020    PER PCP-ANTIBODY TEST   • History of chemotherapy    • History of radiation therapy    • Hyperlipidemia 7/6/2019   • Hypertension    • Impaired fasting glucose 7/6/2019   • Lymph node enlargement 03/2021   • OA (osteoarthritis)     MULTIPLE SITES   • Osteoporosis    • Perirectal ulcer 02/11/2021    ADMITTED TO [facility]   • Psoriasis    • 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   • Radiation dermatitis    • Rectal bleeding     WOUND, TO HAVE BIOPSIED PER DR MARCH   • Rheumatoid arthritis (CMS/HCC)    • SCC (squamous cell carcinoma)     ANAL, S/P EXCISION AROUND 2013, HAD RADIATION AND CHEMO   • Syncope and collapse 04/15/2021    ADMITTED TO [facility]   • Thoracic aortic aneurysm without rupture (CMS/HCC) 3/20/2021   • Uses hearing aid     BILATERAL   • Wound dehiscence 4/15/2021       Procedures Performed  Procedure(s):  ABDOMINAL PERINEAL RESECTION LAPAROSCOPIC WITH DAVINCI ROBOT  BILATERAL GLUTEAL ADVANCEMENT FLAP AND GRACILIS MUSCLE FLAP      Hospital Course  Patient is a 75 y.o. male presented with recurrent anal cancer after having had treatment 8 years ago with radiation and chemotherapy.  Patient underwent above procedure made uneventful recovery.  His diet was advanced from clears to regular.  His Hernández was removed and he urinated without difficulty.  His stoma started producing  stool after a couple of days.  He was discharged home with home health with restrictions on movement of only standing or laying flat because of the plastic surgery advanced closure.        Consults:   Consults     No orders found from 3/8/2021 to 4/7/2021.            Discharge Disposition  Home-Health Care [facility]    Discharge Medications     Discharge Medications      New Medications      Instructions Start Date   bacitracin 500 UNIT/GM ointment   Topical, Daily         Continue These Medications      Instructions Start Date   allopurinol 100 MG tablet  Commonly known as: ZYLOPRIM   100 mg, Oral, Daily PRN      atorvastatin 40 MG tablet  Commonly known as: LIPITOR   40 mg, Oral, Daily      clobetasol 0.05 % cream  Commonly known as: TEMOVATE   1 application, Topical, 2 Times Daily PRN      colchicine 0.6 MG tablet   0.6 mg, Oral, Daily PRN      indomethacin SR 75 MG CR capsule  Commonly known as: INDOCIN SR   75 mg, Oral, Daily With Breakfast, HOLD PRIOR TO  PER MD INSTRUCTIONS      zolpidem 5 MG tablet  Commonly known as: AMBIEN   5 mg, Oral, Nightly PRN         Stop These Medications    hydrALAZINE 100 MG tablet  Commonly known as: APRESOLINE            Discharge Diet:   Diet Instructions     Diet: Regular      Discharge Diet: Regular          Activity at Discharge:   Activity Instructions     Driving Restrictions      Type of Restriction: Driving    Driving Restrictions: No Driving Until Next Appointment    Gradually Increase Activity Until at Pre-Hospitalization Level      Lifting Restrictions      Type of Restriction: Lifting    Lifting Restrictions: Lifting Restriction (Indicate Limit)    Weight Limit (Pounds): 15    Length of Lifting Restriction: until office appt          Follow-up Appointments  Future Appointments   Date Time Provider Department Center   4/26/2021 11:15 AM Makeda Ocasio MD [facility]  [facility]   10/18/2021  9:40 AM Livier Bui MD [facility] None     Additional Instructions for the Follow-ups  that You Need to Schedule     Ambulatory Referral to Home Health   As directed      Face to Face Visit Date: 4/12/2021    Follow-up provider for Plan of Care?: I will be treating the patient on an ongoing basis.  Please send me the Plan of Care for signature.    Follow-up provider: ALESSANDRO MARCH [[id number]]    Reason/Clinical Findings: ostomy, wound    Describe mobility limitations that make leaving home difficult: post op    Nursing/Therapeutic Services Requested: Skilled Nursing    Skilled nursing orders: Wound care dressing/changes Ostomy instruction    Frequency: 1 Week 1         Call MD With Problems / Concerns   As directed      Instructions: temp >101. Drainage from wound. vomitting.    Order Comments: Instructions: temp >101. Drainage from wound. vomitting.          Discharge Follow-up with Specified Provider: Amarjit; 2 Weeks   As directed      To: Amarjit    Follow Up: 2 Weeks         Discharge Follow-up with Specified Provider: Please call to make appt for patient with Dr. Bardales on 4/19 or 21   As directed      To: Please call to make appt for patient with Dr. Bardales on 4/19 or 21               Discharge Diet:   As tolerated  Activity at Discharge:   As tolerated  Follow-up Appointments  2 wks

## 2021-04-23 ENCOUNTER — TELEPHONE (OUTPATIENT)
Dept: SURGERY | Facility: CLINIC | Age: 76
End: 2021-04-23

## 2021-04-23 NOTE — TELEPHONE ENCOUNTER
Pt's daughter called. Theyw ent to pharmacy and Eloquis was $500. Wants to know if it can be changed to something cheaper. Also pt c/o lethargy and weakness. Only gets out of bed about an hour a day, cannot do more because he feels so weak. She called home health but has not heard back from them yet. Denies fever and pain. Says BP has been more elevated in last 48 hours, reaching 165/98. BMs are more formed.

## 2021-04-26 DIAGNOSIS — C21.0 ANAL CANCER (HCC): Primary | ICD-10-CM

## 2021-04-27 ENCOUNTER — TELEPHONE (OUTPATIENT)
Dept: ONCOLOGY | Facility: CLINIC | Age: 76
End: 2021-04-27

## 2021-04-27 NOTE — TELEPHONE ENCOUNTER
CALLED ZI ABOUT THE APPT THAT I HAVE RESCHEDULED HER FATHER TO BE SEEN ON 5/6/21 - DUE TO OTHER APPTS - SENT MESSAGE TO [facility] ABOUT PT NOT BEING ABLE TO SIT OR STAND FOR ANY PERIOD OF TIME - I HAVE ADDED PT TO THE INFUSION ROOM FOR WHEN THE PT ARRIVES HE WILL GO STRAIGHT BACK TO INFUSION AREA.

## 2021-04-27 NOTE — TELEPHONE ENCOUNTER
Caller: ZI     Relationship to patient: DAUGHTER     Best call back number:[phone]    PATIENT HAS ANOTHER APPT ON 5-5. DAUGHTER WOULD LIKE TO R/S 5-6 OR 5-7  PLEASE CALL AND ADVISE   THANK YOU

## 2021-04-28 ENCOUNTER — READMISSION MANAGEMENT (OUTPATIENT)
Dept: CALL CENTER | Facility: HOSPITAL | Age: 76
End: 2021-04-28

## 2021-04-28 NOTE — OUTREACH NOTE
General Surgery Week 2 Survey      Responses   [facility] patient discharged from?  [place]   Does the patient have one of the following disease processes/diagnoses(primary or secondary)?  General Surgery   Week 2 attempt successful?  No   Unsuccessful attempts  Attempt 1          María Flower RN

## 2021-05-03 ENCOUNTER — TELEPHONE (OUTPATIENT)
Dept: SURGERY | Facility: CLINIC | Age: 76
End: 2021-05-03

## 2021-05-03 DIAGNOSIS — C21.0 ANAL CANCER (HCC): Primary | ICD-10-CM

## 2021-05-03 NOTE — TELEPHONE ENCOUNTER
Spoke with pt's daughter, scheduled pt for this Friday. Gave her number for WOCN at [facility], put in order for ostomy eval.

## 2021-05-03 NOTE — TELEPHONE ENCOUNTER
Daughter calling-patients stoma has clear membrane over stoma and stool is not coming out easily. Also this  weekend noticed a string of tissue has come out at the side of stoma.

## 2021-05-03 NOTE — TELEPHONE ENCOUNTER
Suture will come out. That is normal.  He can take MOM, miralax or what he would have taken before surgery if feel constipated.

## 2021-05-03 NOTE — TELEPHONE ENCOUNTER
Patient's daughter Madie called and said that she feels her dad needs to be seen because he has a big rash around stoma, and there's like a clear old skin that's causing the stool to get stuck.    A new [facility] comes over once a week, and she said not thrill with them because the nurse seemed like she never saw a stoma before per daughter Madie.    Madie cell phone [phone].    Has appt with you on 05/17/2021.    Please advice.    Thank you.            Patient phoned office and I relayed to him what Dr. Ocasio had reply that suture will come out and it's normal, and to also take milk of magnesium or what he would take before surgery if he feels constipated.    Patient voice understanding.    Thank you.

## 2021-05-04 ENCOUNTER — READMISSION MANAGEMENT (OUTPATIENT)
Dept: CALL CENTER | Facility: HOSPITAL | Age: 76
End: 2021-05-04

## 2021-05-04 NOTE — OUTREACH NOTE
General Surgery Week 2 Survey      Responses   [facility] [place] operated by [facility] patient discharged from?  [place]   Does the patient have one of the following disease processes/diagnoses(primary or secondary)?  General Surgery   Week 2 attempt successful?  Yes   Call start time  1303   Call end time  1307   Discharge diagnosis  Syncope and collapse    Meds reviewed with patient/caregiver?  Yes   Is the patient taking all medications as directed (includes completed medication regime)?  Yes   Does the patient have a follow up appointment scheduled with their surgeon?  Yes [4/26/21,  2nd f/u 5/10/21]   Has the patient kept scheduled appointments due by today?  Yes   Comments  Appt with Dr. Bardales was on 4/30/21,  Appt on 5/5/21 with surgery r/t spot in groin,  oncology appt on 5/6/21   What is the Home health agency?   [facility]   What is the patient's perception of their health status since discharge?  Improving   Is the patient/caregiver able to teach back signs and symptoms of incisional infection?  Fever   Is the patient/caregiver able to teach back steps to recovery at home?  Rest and rebuild strength, gradually increase activity, Eat a well-balance diet [Pt has lost 30 lbs]   Week 2 call completed?  Yes          Lola Muro RN

## 2021-05-05 ENCOUNTER — APPOINTMENT (OUTPATIENT)
Dept: LAB | Facility: HOSPITAL | Age: 76
End: 2021-05-05

## 2021-05-05 NOTE — PROGRESS NOTES
REFERRING PROVIDER:    Makeda Ocasio MD  [address]  [address] [address]    REASON FOR CONSULTATION: Metastatic anal cancer    HISTORY OF PRESENT ILLNESS:  Darwin Sanchez is a 75 y.o. male who is referred today to discuss treatment options for metastatic anal cancer.    History of anal cancer in 2013 treated with chemoradiation with Xeloda.  He had significant skin toxicity with that treatment.  Chronic non-healing wound after that. Biopsies in 2016 and 2018 benign.     Worsening local changes since late 2020.    CT imaging 2/11/2021 with a sub-6 mm pulmonary nodule within the right lower lobe with mildly enlarged bilateral common iliac nodes.  Skin defect at the anus.    Surgical biopsy on 2/23/2021 with invasive moderately differentiated keratinizing squamous cell carcinoma.    PET scan on 3/25/2021 with hypermetabolic activity along the stable appearing linear tract of air in the subcutaneous tissues of the right gluteal crease.  1.1 cm right inguinal lymph node increased in size and intensely hypermetabolic.  Stable 3 mm right lower lobe pulmonary nodule.    Right inguinal lymph node biopsy on 4/2/2021 positive for metastatic squamous cell carcinoma.    Laparoscopic abdominal perineal resection on 4/6/2021 by Dr. Ocasio and muscle flap closure by Dr. Bardales with 4.2 cm invasive poorly differentiated focally keratinizing squamous cell carcinoma with ulceration and mixed inflammation.  No lymphovascular invasion.  12 benign lymph nodes.  Sigmoid colon unremarkable with 4 benign lymph nodes.  pT2, PN0.    He has a perineal wound that partially opened after a fall and was taken to the OR by Dr. Bardales for debridement and re-suture on 4/15.      He saw Dr. Zimmerman with surgical oncology.  Initial thoughts are for superficial lymphadenectomy in the right inguinal area.    He is otherwise doing well at this point.  Prior to this issue he was very active walking 5 miles per day on a treadmill  and continuing to work.          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   • History of 2019 novel coronavirus disease (COVID-19) 03/2020    PER PCP-ANTIBODY TEST   • History of chemotherapy    • History of radiation therapy    • Hyperlipidemia 7/6/2019   • Hypertension    • Impaired fasting glucose 7/6/2019   • Lymph node enlargement 03/2021   • OA (osteoarthritis)     MULTIPLE SITES   • Osteoporosis    • Perirectal ulcer 02/11/2021    ADMITTED TO [facility]   • Psoriasis    • 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   • Radiation dermatitis    • Rectal bleeding     WOUND, TO HAVE BIOPSIED PER DR MARCH   • Rheumatoid arthritis (CMS/HCC)    • SCC (squamous cell carcinoma)     ANAL, S/P EXCISION AROUND 2013, HAD RADIATION AND CHEMO   • Syncope and collapse 04/15/2021    ADMITTED TO [facility]   • Thoracic aortic aneurysm without rupture (CMS/HCC) 3/20/2021   • Uses hearing aid     BILATERAL   • Wound dehiscence 4/15/2021       Past Surgical History:   Procedure Laterality Date   • ABDOMINAL PERINEAL RESECTION N/A 4/6/2021    Procedure: ABDOMINAL PERINEAL RESECTION LAPAROSCOPIC WITH DAVINCI ROBOT;  Surgeon: Makeda March MD;  Location: [facility];  Service: [facility];  Laterality: N/A;   • BREAST RECONSTRUCTION WITH LATISSIMUS MUSCLE FLAP AND IMPLANT INSERTION Bilateral 4/6/2021    Procedure: BILATERAL GLUTEAL ADVANCEMENT FLAP AND GRACILIS MUSCLE FLAP;  Surgeon: Mark Bardales MD;  Location: [facility];  Service: Plastics;  Laterality: Bilateral;   • COLONOSCOPY  2018   • CONDYLOMA REMOVAL N/A 2/23/2021    Procedure: PERINEAL/ANAL LESION  EXCISION/FULGURATION;  Surgeon: Makeda Ocasio MD;  Location: [facility];  Service: General;  Laterality: N/A;   • EXAM UNDER ANESTHESIA, RECTAL BIOPSY  04/02/2021   • KNEE ARTHROSCOPY Bilateral     OVER 35 YRS AGO, DR. CACERES   • MUSCLE FLAP N/A 4/15/2021    Procedure: PERINEUM WOUND DEBRIDEMENT AND COMPLEX CLOSURE;  Surgeon: Mark Bardales MD;  Location: [facility];  Service: Plastics;  Laterality: N/A;   • RECTAL EXAMINATION UNDER ANESTHESIA N/A 03/09/2018    WITH BIOPSIES OF ANUS, DR. LINA ISIDRO AT [facility]   • SOFT TISSUE BIOPSY  04/02/2021    GROIN   • SQUAMOUS CELL CARCINOMA EXCISION N/A 09/23/2016    DIETER RECTAL AREA, DR. LINA ISIDRO AT [facility]   • TOTAL KNEE ARTHROPLASTY Right 10/17/2018    Procedure: TOTAL KNEE ARTHROPLASTY;  Surgeon: Cooper Ruby MD;  Location: [facility];  Service: Orthopedics       SOCIAL HISTORY:   reports that he has never smoked. He has never used smokeless tobacco. He reports that he does not drink alcohol and does not use drugs.    FAMILY HISTORY:  family history includes Aneurysm in his brother; Cancer in his brother and father; Heart disease in his brother, father, and mother; Hypertension in his brother, brother, father, and mother.    ALLERGIES:  Allergies   Allergen Reactions   • Hydrocodone-Acetaminophen Itching, Mental Status Change and Hallucinations     hallucinations   • Hydrocodone Anxiety, Confusion, Delirium and Hallucinations       MEDICATIONS:  The medication list has been reviewed with the patient by the medical assistant, and the list has been updated in the electronic medical record, which I reviewed.  Medication dosages and frequencies were confirmed to be accurate.    Review of Systems   Review of Systems   Constitutional: Negative for appetite change, chills, fatigue, fever and unexpected weight change.   HENT: Negative for congestion, dental problem, ear pain, hearing loss, mouth sores, nosebleeds, postnasal drip, rhinorrhea,  "tinnitus and trouble swallowing.    Eyes: Negative.    Respiratory: Negative for cough, chest tightness, shortness of breath, wheezing and stridor.    Cardiovascular: Negative for chest pain, palpitations and leg swelling.   Endocrine: Negative.    Genitourinary: Negative for decreased urine volume, difficulty urinating, dysuria, flank pain, frequency, hematuria, scrotal swelling, testicular pain and urgency.   Musculoskeletal: Negative for arthralgias, back pain and joint swelling.   Allergic/Immunologic: Negative.    Neurological: Negative for dizziness, seizures, syncope, weakness, light-headedness, numbness and headaches.   Hematological: Negative for adenopathy. Does not bruise/bleed easily.   Psychiatric/Behavioral: Negative for confusion and sleep disturbance. The patient is not nervous/anxious.    All other systems reviewed and are negative.      Vitals:    05/06/21 0946   BP: 132/86   Pulse: 74   Resp: 16   Temp: 98.2 °F (36.8 °C)   TempSrc: Temporal   SpO2: 95%   Weight: 103 kg (228 lb)  Comment: stated weight    had trouble walking   Height: 182.9 cm (72.01\")   PainSc: 0-No pain  Comment: anal cancer       Physical Exam  Vitals and nursing note reviewed.   Constitutional:       General: He is not in acute distress.     Appearance: He is well-developed.   HENT:      Head: Normocephalic and atraumatic. Hair is normal.   Eyes:      General: Lids are normal.      Conjunctiva/sclera: Conjunctivae normal.      Pupils: Pupils are equal.   Neck:      Thyroid: No thyroid mass or thyromegaly.      Vascular: No JVD.   Cardiovascular:      Rate and Rhythm: Normal rate and regular rhythm.      Heart sounds: No murmur heard.   No friction rub. No gallop.    Pulmonary:      Effort: Pulmonary effort is normal. No respiratory distress.      Breath sounds: Normal breath sounds. No wheezing or rales.   Abdominal:      General: The ostomy site is clean. There is no distension.      Palpations: Abdomen is soft. There is no " hepatomegaly, splenomegaly or mass.      Tenderness: There is no abdominal tenderness. There is no guarding.      Comments: Functioning colostomy present   Musculoskeletal:         General: No tenderness or deformity. Normal range of motion.      Cervical back: Neck supple.   Lymphadenopathy:      Cervical: No cervical adenopathy.      Upper Body:      Right upper body: No supraclavicular adenopathy.      Left upper body: No supraclavicular adenopathy.      Lower Body: Right inguinal adenopathy (Palpable 1 cm right inguinal node) present.   Skin:     General: Skin is warm and dry.      Findings: No rash.   Neurological:      Mental Status: He is alert and oriented to person, place, and time.   Psychiatric:         Behavior: Behavior normal.         Thought Content: Thought content normal.         Judgment: Judgment normal.         DIAGNOSTIC DATA:  CBC & Differential (05/06/2021 09:47)      IMAGING:    NM Pet Skull Base To Mid Thigh (03/25/2021 13:42)    I personally reviewed PET scan images from 3/25/2021.  Hypermetabolic activity at the anus with a 1.1 cm right inguinal lymph node which is hypermetabolic.    ASSESSMENT:  This is a 75 y.o. male with:    *Recurrent anal squamous cell carcinoma  · History of anal cancer in 2013 treated with chemoradiation with Xeloda.  He had significant skin toxicity with that treatment.  Chronic non-healing wound after that. Biopsies in 2016 and 2018 benign.   · Worsening local changes since late 2020.  · CT imaging 2/11/2021 with a sub-6 mm pulmonary nodule within the right lower lobe with mildly enlarged bilateral common iliac nodes.  Skin defect at the anus.  · Surgical biopsy on 2/23/2021 with invasive moderately differentiated keratinizing squamous cell carcinoma.  · PET scan on 3/25/2021 with hypermetabolic activity along the stable appearing linear tract of air in the subcutaneous tissues of the right gluteal crease.  1.1 cm right inguinal lymph node increased in size and  intensely hypermetabolic.  Stable 3 mm right lower lobe pulmonary nodule.  · Right inguinal lymph node biopsy on 4/2/2021 positive for metastatic squamous cell carcinoma.  · Laparoscopic abdominal perineal resection on 4/6/2021 by Dr. Ocasio and muscle flap closure by Dr. Bardales with 4.2 cm invasive poorly differentiated focally keratinizing squamous cell carcinoma with ulceration and mixed inflammation.  No lymphovascular invasion.  12 benign lymph nodes.  Sigmoid colon unremarkable with 4 benign lymph nodes.  pT2, PN0.  · He has a perineal wound that partially opened after a fall and was taken to the OR by Dr. Bardales for debridement and re-suture on 4/15.    · He saw Dr. Zimmerman with surgical oncology.  Initial thoughts are for superficial lymphadenectomy in the right inguinal area.    *Normocytic anemia  · Hemoglobin on 4/17/2021 was 10.0 with a normal MCV.  · His hemoglobin has improved to 12.0 today.    PLAN:   1. I think pursuing a limited lymph node dissection in the right inguinal area is reasonable.  I would like to see how he does after that.  There is not really any data for adjuvant therapy in this situation but if he is doing exceptionally well and wants to consider some chemotherapy we could consider 4-6 cycles of carboplatin and paclitaxel after he has healed from his surgical procedures.  The other option would be for observation and palliative chemotherapy at the time of progression.  I outlined these options for him and his son and daughter who are present at the visit today.  I would like to see him back in early July which should be about a month after his right inguinal node dissection to see how he is doing and discuss options further at that time.    I spent 60 minutes in this visit today reviewing his record, communicating with him and his children, communicating with Dr. Zimmerman, and documenting the encounter.

## 2021-05-06 ENCOUNTER — APPOINTMENT (OUTPATIENT)
Dept: ONCOLOGY | Facility: HOSPITAL | Age: 76
End: 2021-05-06

## 2021-05-06 ENCOUNTER — CONSULT (OUTPATIENT)
Dept: ONCOLOGY | Facility: CLINIC | Age: 76
End: 2021-05-06

## 2021-05-06 ENCOUNTER — LAB (OUTPATIENT)
Dept: LAB | Facility: HOSPITAL | Age: 76
End: 2021-05-06

## 2021-05-06 ENCOUNTER — TELEPHONE (OUTPATIENT)
Dept: ONCOLOGY | Facility: CLINIC | Age: 76
End: 2021-05-06

## 2021-05-06 VITALS
TEMPERATURE: 98.2 F | HEART RATE: 74 BPM | WEIGHT: 228 LBS | OXYGEN SATURATION: 95 % | BODY MASS INDEX: 30.88 KG/M2 | SYSTOLIC BLOOD PRESSURE: 132 MMHG | DIASTOLIC BLOOD PRESSURE: 86 MMHG | RESPIRATION RATE: 16 BRPM | HEIGHT: 72 IN

## 2021-05-06 DIAGNOSIS — C21.0 ANAL CANCER (HCC): Primary | ICD-10-CM

## 2021-05-06 DIAGNOSIS — D64.9 NORMOCYTIC ANEMIA: ICD-10-CM

## 2021-05-06 LAB
BASOPHILS # BLD AUTO: 0.03 10*3/MM3 (ref 0–0.2)
BASOPHILS NFR BLD AUTO: 0.4 % (ref 0–1.5)
DEPRECATED RDW RBC AUTO: 40.7 FL (ref 37–54)
EOSINOPHIL # BLD AUTO: 0.12 10*3/MM3 (ref 0–0.4)
EOSINOPHIL NFR BLD AUTO: 1.6 % (ref 0.3–6.2)
ERYTHROCYTE [DISTWIDTH] IN BLOOD BY AUTOMATED COUNT: 13.1 % (ref 12.3–15.4)
HCT VFR BLD AUTO: 37.2 % (ref 37.5–51)
HGB BLD-MCNC: 12 G/DL (ref 13–17.7)
IMM GRANULOCYTES # BLD AUTO: 0.04 10*3/MM3 (ref 0–0.05)
IMM GRANULOCYTES NFR BLD AUTO: 0.5 % (ref 0–0.5)
LYMPHOCYTES # BLD AUTO: 1.5 10*3/MM3 (ref 0.7–3.1)
LYMPHOCYTES NFR BLD AUTO: 20.4 % (ref 19.6–45.3)
MCH RBC QN AUTO: 27.9 PG (ref 26.6–33)
MCHC RBC AUTO-ENTMCNC: 32.3 G/DL (ref 31.5–35.7)
MCV RBC AUTO: 86.5 FL (ref 79–97)
MONOCYTES # BLD AUTO: 0.76 10*3/MM3 (ref 0.1–0.9)
MONOCYTES NFR BLD AUTO: 10.3 % (ref 5–12)
NEUTROPHILS NFR BLD AUTO: 4.91 10*3/MM3 (ref 1.7–7)
NEUTROPHILS NFR BLD AUTO: 66.8 % (ref 42.7–76)
NRBC BLD AUTO-RTO: 0 /100 WBC (ref 0–0.2)
PLATELET # BLD AUTO: 154 10*3/MM3 (ref 140–450)
PMV BLD AUTO: 11.2 FL (ref 6–12)
RBC # BLD AUTO: 4.3 10*6/MM3 (ref 4.14–5.8)
WBC # BLD AUTO: 7.36 10*3/MM3 (ref 3.4–10.8)

## 2021-05-06 PROCEDURE — 85025 COMPLETE CBC W/AUTO DIFF WBC: CPT

## 2021-05-06 PROCEDURE — 99205 OFFICE O/P NEW HI 60 MIN: CPT | Performed by: INTERNAL MEDICINE

## 2021-05-06 PROCEDURE — 36415 COLL VENOUS BLD VENIPUNCTURE: CPT

## 2021-05-06 RX ORDER — LOSARTAN POTASSIUM 25 MG/1
TABLET ORAL
COMMUNITY
End: 2021-05-06 | Stop reason: DRUGHIGH

## 2021-05-06 RX ORDER — LOSARTAN POTASSIUM AND HYDROCHLOROTHIAZIDE 12.5; 5 MG/1; MG/1
1 TABLET ORAL
COMMUNITY
End: 2021-06-22

## 2021-05-06 RX ORDER — FOAM BANDAGE 4" X 4"
BANDAGE TOPICAL
COMMUNITY
Start: 2020-11-30 | End: 2021-05-06 | Stop reason: SDDI

## 2021-05-06 RX ORDER — CARVEDILOL 12.5 MG/1
12.5 TABLET ORAL
COMMUNITY
End: 2021-05-06 | Stop reason: SDDI

## 2021-05-06 NOTE — TELEPHONE ENCOUNTER
CSW contacted the patient to follow up on a DQ score of 5, and to assess support/resource needs.      CSW left a message for the patient with contact information and requested a return call.

## 2021-05-07 ENCOUNTER — OFFICE VISIT (OUTPATIENT)
Dept: SURGERY | Facility: CLINIC | Age: 76
End: 2021-05-07

## 2021-05-07 VITALS
TEMPERATURE: 97.6 F | HEIGHT: 72 IN | DIASTOLIC BLOOD PRESSURE: 100 MMHG | SYSTOLIC BLOOD PRESSURE: 130 MMHG | BODY MASS INDEX: 30.99 KG/M2 | OXYGEN SATURATION: 98 % | HEART RATE: 88 BPM | WEIGHT: 228.8 LBS

## 2021-05-07 DIAGNOSIS — C21.0 ANAL CANCER (HCC): Primary | ICD-10-CM

## 2021-05-07 PROCEDURE — 99024 POSTOP FOLLOW-UP VISIT: CPT | Performed by: COLON & RECTAL SURGERY

## 2021-05-11 ENCOUNTER — TELEPHONE (OUTPATIENT)
Dept: SURGERY | Facility: CLINIC | Age: 76
End: 2021-05-11

## 2021-05-11 NOTE — TELEPHONE ENCOUNTER
Called son Moses, busy tone only, tried calling 3 times, there was a busy tone all 3 times. Called patient's home phone number, recording said this call cannot be completed please try call again. Called [facility], got Keely's number. Called Keely and no answer, left message with 's instructions. Called patient's cell phone, no answer, left message to return my call.

## 2021-05-11 NOTE — TELEPHONE ENCOUNTER
Patients son calling- [facility] nurse was unable to remove packing today. Packing was deep and has granulation over top of it nurse (Keely) said it would bleed and require stitches if she removed packing. Asking what they should do.

## 2021-05-12 ENCOUNTER — READMISSION MANAGEMENT (OUTPATIENT)
Dept: CALL CENTER | Facility: HOSPITAL | Age: 76
End: 2021-05-12

## 2021-05-12 ENCOUNTER — TELEPHONE (OUTPATIENT)
Dept: SURGERY | Facility: CLINIC | Age: 76
End: 2021-05-12

## 2021-05-12 NOTE — TELEPHONE ENCOUNTER
Called pt back, no answer, left message to return my call. Called pt's son Moses at [phone], and there was a busy tone.

## 2021-05-12 NOTE — OUTREACH NOTE
General Surgery Week 3 Survey      Responses   [facility] patient discharged from?  [place]   Does the patient have one of the following disease processes/diagnoses(primary or secondary)?  General Surgery   Week 3 attempt successful?  No   Unsuccessful attempts  Attempt 1          Leyla Veronica RN

## 2021-05-12 NOTE — TELEPHONE ENCOUNTER
Patients son called back says there was some miscommunication or issues with his phone yesterday and they were unable to discuss issues with wound packing. Confirmed with him that he had left his name and cell phone for us to call, he agreed but said he guessed there was problems with his phone. He would like you to call patients cell but he will be with him.

## 2021-05-14 ENCOUNTER — OFFICE VISIT (OUTPATIENT)
Dept: SURGERY | Facility: CLINIC | Age: 76
End: 2021-05-14

## 2021-05-14 ENCOUNTER — READMISSION MANAGEMENT (OUTPATIENT)
Dept: CALL CENTER | Facility: HOSPITAL | Age: 76
End: 2021-05-14

## 2021-05-14 VITALS
TEMPERATURE: 96.8 F | SYSTOLIC BLOOD PRESSURE: 160 MMHG | DIASTOLIC BLOOD PRESSURE: 94 MMHG | HEIGHT: 72 IN | BODY MASS INDEX: 30.6 KG/M2 | WEIGHT: 225.9 LBS | OXYGEN SATURATION: 95 % | HEART RATE: 82 BPM

## 2021-05-14 DIAGNOSIS — C21.0 ANAL CANCER (HCC): Primary | ICD-10-CM

## 2021-05-14 PROCEDURE — 99024 POSTOP FOLLOW-UP VISIT: CPT | Performed by: COLON & RECTAL SURGERY

## 2021-05-14 NOTE — OUTREACH NOTE
General Surgery Week 3 Survey      Responses   [facility], [place], operated by [facility]   Does the patient have one of the following disease processes/diagnoses(primary or secondary)?  General Surgery   Week 3 attempt successful?  Yes   Call start time  1647   Call end time  1650   Discharge diagnosis  Syncope and collapse    Meds reviewed with patient/caregiver?  Yes   Is the patient taking all medications as directed (includes completed medication regime)?  Yes   Has the patient kept scheduled appointments due by today?  Yes   Psychosocial issues?  No   What is the patient's perception of their health status since discharge?  Improving   Is the patient /caregiver able to teach back basic post-op care?  Keep incision areas clean,dry and protected   Is the patient/caregiver able to teach back signs and symptoms of incisional infection?  Increased redness, swelling or pain at the incisonal site, Incisional warmth   Is the patient/caregiver able to teach back steps to recovery at home?  Rest and rebuild strength, gradually increase activity, Eat a well-balance diet   Is the patient/caregiver able to teach back the hierarchy of who to call/visit for symptoms/problems? PCP, Specialist, Home health nurse, Urgent Care, ED, 911  Yes   Additional teach back comments  Pt doing well, bottom healing and his appetitie is picking up some.   Week 3 call completed?  Yes          Blanka Mix RN

## 2021-05-21 ENCOUNTER — OFFICE VISIT (OUTPATIENT)
Dept: SURGERY | Facility: CLINIC | Age: 76
End: 2021-05-21

## 2021-05-21 VITALS
DIASTOLIC BLOOD PRESSURE: 86 MMHG | HEART RATE: 67 BPM | WEIGHT: 228 LBS | SYSTOLIC BLOOD PRESSURE: 148 MMHG | BODY MASS INDEX: 30.88 KG/M2 | TEMPERATURE: 97.1 F | OXYGEN SATURATION: 98 % | HEIGHT: 72 IN

## 2021-05-21 DIAGNOSIS — R19.5 LOOSE STOOLS: Primary | ICD-10-CM

## 2021-05-21 PROCEDURE — 99024 POSTOP FOLLOW-UP VISIT: CPT | Performed by: COLON & RECTAL SURGERY

## 2021-05-25 ENCOUNTER — READMISSION MANAGEMENT (OUTPATIENT)
Dept: CALL CENTER | Facility: HOSPITAL | Age: 76
End: 2021-05-25

## 2021-06-17 ENCOUNTER — HOME HEALTH ADMISSION (OUTPATIENT)
Dept: HOME HEALTH SERVICES | Facility: HOME HEALTHCARE | Age: 76
End: 2021-06-17

## 2021-06-17 ENCOUNTER — TRANSCRIBE ORDERS (OUTPATIENT)
Dept: HOME HEALTH SERVICES | Facility: HOME HEALTHCARE | Age: 76
End: 2021-06-17

## 2021-06-17 DIAGNOSIS — I10 ESSENTIAL HYPERTENSION, MALIGNANT: Primary | ICD-10-CM

## 2021-06-18 ENCOUNTER — HOME CARE VISIT (OUTPATIENT)
Dept: HOME HEALTH SERVICES | Facility: HOME HEALTHCARE | Age: 76
End: 2021-06-18

## 2021-06-21 ENCOUNTER — TELEPHONE (OUTPATIENT)
Dept: CARDIOLOGY | Facility: CLINIC | Age: 76
End: 2021-06-21

## 2021-06-21 NOTE — TELEPHONE ENCOUNTER
Pt transferred from scheduling.   pts PCP has been managing blood pressure and added hydralazine.  Pt stopped on his own due to side effects.  Pt is still having elevated blood pressures running 160-170/90s.  PCP wanted pt to be see for this and I have scheduled them for tomorrow.  I have asked them to bring their log and cuff to the apt.  Thanks  Radha Bangura RN  Triage nurse

## 2021-06-22 ENCOUNTER — HOME CARE VISIT (OUTPATIENT)
Dept: HOME HEALTH SERVICES | Facility: HOME HEALTHCARE | Age: 76
End: 2021-06-22

## 2021-06-22 ENCOUNTER — OFFICE VISIT (OUTPATIENT)
Dept: CARDIOLOGY | Facility: CLINIC | Age: 76
End: 2021-06-22

## 2021-06-22 VITALS
HEIGHT: 72 IN | BODY MASS INDEX: 31.15 KG/M2 | SYSTOLIC BLOOD PRESSURE: 152 MMHG | DIASTOLIC BLOOD PRESSURE: 68 MMHG | WEIGHT: 230 LBS | HEART RATE: 73 BPM | TEMPERATURE: 98.3 F

## 2021-06-22 VITALS
HEART RATE: 83 BPM | WEIGHT: 235 LBS | BODY MASS INDEX: 31.83 KG/M2 | HEIGHT: 72 IN | DIASTOLIC BLOOD PRESSURE: 84 MMHG | SYSTOLIC BLOOD PRESSURE: 128 MMHG

## 2021-06-22 DIAGNOSIS — I71.20 THORACIC AORTIC ANEURYSM WITHOUT RUPTURE (HCC): ICD-10-CM

## 2021-06-22 DIAGNOSIS — I25.810 CORONARY ARTERY DISEASE INVOLVING CORONARY BYPASS GRAFT OF NATIVE HEART WITHOUT ANGINA PECTORIS: ICD-10-CM

## 2021-06-22 DIAGNOSIS — G47.10 HYPERSOMNOLENCE: ICD-10-CM

## 2021-06-22 DIAGNOSIS — I10 BENIGN ESSENTIAL HYPERTENSION: Primary | ICD-10-CM

## 2021-06-22 PROCEDURE — 99214 OFFICE O/P EST MOD 30 MIN: CPT | Performed by: NURSE PRACTITIONER

## 2021-06-22 PROCEDURE — 93000 ELECTROCARDIOGRAM COMPLETE: CPT | Performed by: NURSE PRACTITIONER

## 2021-06-22 PROCEDURE — G0299 HHS/HOSPICE OF RN EA 15 MIN: HCPCS

## 2021-06-22 RX ORDER — TRAMADOL HYDROCHLORIDE 50 MG/1
50 TABLET ORAL AS NEEDED
COMMUNITY
End: 2021-10-22

## 2021-06-22 RX ORDER — LOSARTAN POTASSIUM 100 MG/1
100 TABLET ORAL DAILY
COMMUNITY

## 2021-06-22 RX ORDER — GABAPENTIN 300 MG/1
300 CAPSULE ORAL EVERY 8 HOURS
COMMUNITY
Start: 2021-06-17 | End: 2021-07-19

## 2021-06-22 RX ORDER — CYCLOBENZAPRINE HCL 10 MG
10 TABLET ORAL 3 TIMES DAILY PRN
COMMUNITY
Start: 2021-06-21 | End: 2021-06-24

## 2021-06-22 RX ORDER — HYDROCHLOROTHIAZIDE 12.5 MG/1
12.5 TABLET ORAL DAILY
COMMUNITY
End: 2021-10-22

## 2021-06-22 NOTE — HOME HEALTH
pt is 75 yowm who lives with his wife as primary caregiver. pt underwent a right groing lymphectomy with drain placement. pt underwent radical colon cancer surgery in april of 2021 and lymph nodes were positive. pt has a drain in his right leg and incision to his right groin that is not visible related to non removeable dressing. pt is using a cane to get about his home with assist of one.     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

## 2021-06-22 NOTE — PROGRESS NOTES
Date of Office Visit: 2021  Encounter Provider: Yaneli Krishna, CHRIS, APRN  Place of Service: [facility] CARDIOLOGY  Patient Name: Darwin Sanchez  :1945        Subjective:     Chief Complaint:  Hypertension, thoracic aortic ectasia, suspected sleep apnea      History of Present Illness:  Darwin Sanchez is a 75 y.o. male patient of Dr. Bui.  This patient is new to me and I reviewed his records.    Patient has a history of hypertension, hyperlipidemia, psoriasis, rheumatoid arthritis, squamous cell anal cancer status post excision in  with radiation and chemotherapy, poor wound healing with chronic ulcer.    2021 patient was found to have rectal bleeding and a nonhealing perianal wound subsequent biopsy showed recurrent carcinoma.  CT of the chest and abdomen showed no adenopathy, ascending aorta measuring 4.4 cm, mild to moderate coronary artery calcification, small pulmonary nodule in the right lower lobe, mildly enlarged common iliac lymph nodes.  He was seen in the office by Dr. Bui 3/2021 for preop clearance prior to resection.  Echo 3/19/2021 showed normal LV systolic function with EF of 62%, grade 1 diastolic dysfunction, borderline increased left atrial volume, trace aortic regurgitation, mild mitral regurgitation, trace tricuspid regurgitation with normal RVSP.  Ascending aorta did appear about 4.2 cm.  Nuclear stress test showed no evidence of ischemia, considered low risk.      Patient presents to office today for follow-up appointment.  Patient's daughter who is a pediatric nurse is with him in the office today, per patient preference.  Patient reports that he recently underwent abdominal perineal resection with new colostomy placement.  He initially had hypotension following surgery and had a fall and then had to have repeat surgery.  Many of his blood pressure medications were stopped.  As his blood pressure has slowly trended back up they have slowly  been readded.  He more recently underwent lymph node dissection with results pending.  They have started to notice over the last week or so that his blood pressure is becoming elevated again.  It is running around 120s over 70s when he wakes up in the morning but then after getting up and being active it goes up into the 170s over 80s.  He reports his PCP put him on hydralazine which caused a headache so he stopped it.  He is not currently taking the metoprolol XL, this was held during one of his hospitalizations due to hypotension.  He did tolerate this well in the past.          Past Medical History:   Diagnosis Date   • Anxiety    • Arthritis    • CAD (coronary artery disease)    • Carotid artery occlusion 07/06/2019   • Chest pain 02/2017   • Coronavirus infection 03/2020   • Dyspnea 04/2017   • ED (erectile dysfunction)    • Fish hook injury of finger 09/04/2017    RIGHT INDEX FINGER, SEEN AT [facility]   • Gout    • Gouty arthropathy 7/6/2019   • History of 2019 novel coronavirus disease (COVID-19) 03/2020    PER PCP-ANTIBODY TEST   • History of chemotherapy    • History of radiation therapy    • Hyperlipidemia 7/6/2019   • Hypertension    • Impaired fasting glucose 7/6/2019   • Lymph node enlargement 03/2021   • OA (osteoarthritis)     MULTIPLE SITES   • Osteoporosis    • Perirectal ulcer 02/11/2021    ADMITTED TO [facility]   • Psoriasis    • 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   • Radiation dermatitis    • Rectal bleeding     WOUND, TO HAVE BIOPSIED PER DR MARCH   • Rheumatoid arthritis (CMS/[facility])    • SCC (squamous cell carcinoma) 04/2020    ANAL, S/P EXCISION AROUND 2013, HAD RADIATION AND CHEMO   • Syncope and collapse 04/15/2021    ADMITTED TO [facility]   • Thoracic aortic aneurysm without rupture (CMS/HCC) 3/20/2021   • Uses hearing aid     BILATERAL   • Wound dehiscence 4/15/2021     Past Surgical History:   Procedure Laterality Date   • ABDOMINAL PERINEAL RESECTION N/A 4/6/2021    Procedure: ABDOMINAL  PERINEAL RESECTION LAPAROSCOPIC WITH DAVINCI ROBOT;  Surgeon: Makeda Ocasio MD;  Location: [facility];  Service: DaVinci;  Laterality: N/A;   • BREAST RECONSTRUCTION WITH LATISSIMUS MUSCLE FLAP AND IMPLANT INSERTION Bilateral 4/6/2021    Procedure: BILATERAL GLUTEAL ADVANCEMENT FLAP AND GRACILIS MUSCLE FLAP;  Surgeon: Mark Bardales MD;  Location: [facility];  Service: Plastics;  Laterality: Bilateral;   • COLONOSCOPY  2018   • CONDYLOMA REMOVAL N/A 2/23/2021    Procedure: PERINEAL/ANAL LESION EXCISION/FULGURATION;  Surgeon: Makeda Ocasio MD;  Location: [facility];  Service: General;  Laterality: N/A;   • EXAM UNDER ANESTHESIA, RECTAL BIOPSY  04/02/2021   • KNEE ARTHROSCOPY Bilateral     OVER 35 YRS AGO, DR. CACERES   • MUSCLE FLAP N/A 4/15/2021    Procedure: PERINEUM WOUND DEBRIDEMENT AND COMPLEX CLOSURE;  Surgeon: Mark Bardales MD;  Location: [facility];  Service: Plastics;  Laterality: N/A;   • RECTAL EXAMINATION UNDER ANESTHESIA N/A 03/09/2018    WITH BIOPSIES OF ANUS, DR. LINA ISIDRO AT [facility]   • SOFT TISSUE BIOPSY  04/02/2021    GROIN   • SQUAMOUS CELL CARCINOMA EXCISION N/A 09/23/2016    DIETER RECTAL AREA, DR. LINA ISIDRO AT [facility]   • TOTAL KNEE ARTHROPLASTY Right 10/17/2018    Procedure: TOTAL KNEE ARTHROPLASTY;  Surgeon: Cooper Ruby MD;  Location: [facility];  Service: Orthopedics     Outpatient Medications Prior to Visit   Medication Sig Dispense Refill   • acetaminophen (TYLENOL) 325 MG tablet Take 2 tablets by mouth Every 6 (Six) Hours As Needed for Mild Pain .     • allopurinol (ZYLOPRIM) 100 MG tablet Take 100 mg by mouth Daily As Needed.     • atorvastatin (LIPITOR) 40 MG tablet Take 40 mg by mouth Daily.     • bacitracin 500 UNIT/GM ointment Apply  topically to the appropriate area as directed Daily. 28 g 2   • clobetasol (TEMOVATE) 0.05 % cream Apply 1 application topically to the appropriate area as directed 2 (Two) Times a Day As Needed.      • cyclobenzaprine (FLEXERIL) 10 MG tablet Take 10 mg by mouth 3 (Three) Times a Day As Needed.     • gabapentin (NEURONTIN) 300 MG capsule Take 300 mg by mouth Every 8 (Eight) Hours.     • hydroCHLOROthiazide (HYDRODIURIL) 12.5 MG tablet Take 12.5 mg by mouth Daily.     • indomethacin SR (INDOCIN SR) 75 MG CR capsule Take 75 mg by mouth 2 (Two) Times a Day With Meals. HOLD PRIOR TO  PER MD INSTRUCTIONS     • losartan (COZAAR) 100 MG tablet Take 100 mg by mouth Daily.     • traMADol (ULTRAM) 50 MG tablet Take 50 mg by mouth As Needed for Moderate Pain .     • zolpidem (AMBIEN) 5 MG tablet Take 5 mg by mouth At Night As Needed.     • metoprolol succinate XL (TOPROL-XL) 25 MG 24 hr tablet Take 0.5 tablets by mouth Daily for 30 days. 15 tablet 0   • losartan-hydrochlorothiazide (HYZAAR) 50-12.5 MG per tablet Take 1 tablet by mouth.       Facility-Administered Medications Prior to Visit   Medication Dose Route Frequency Provider Last Rate Last Admin   • mupirocin (BACTROBAN) 2 % nasal ointment   Nasal BID Cooper Ruby MD           Allergies as of 06/22/2021 - Reviewed 06/22/2021   Allergen Reaction Noted   • Hydrocodone-acetaminophen Itching, Mental Status Change, and Hallucinations 10/30/2018   • Hydrocodone Anxiety, Confusion, Delirium, and Hallucinations 05/05/2021     Social History     Socioeconomic History   • Marital status:      Spouse name: Mayra Fisher   • Number of children: Not on file   • Years of education: Not on file   • Highest education level: Not on file   Tobacco Use   • Smoking status: Never Smoker   • Smokeless tobacco: Never Used   Vaping Use   • Vaping Use: Never used   Substance and Sexual Activity   • Alcohol use: No   • Drug use: No   • Sexual activity: Yes     Comment: .     Family History   Problem Relation Age of Onset   • Heart disease Mother    • Hypertension Mother    • Arthritis Mother    • Osteoporosis Mother    • Heart disease Father    • Hypertension Father    • Cancer  "Father         prostate.   • Arthritis Father    • Heart attack Father    • Hyperlipidemia Father    • Osteoporosis Father    • Aneurysm Brother    • Hypertension Brother    • Cancer Brother    • Lung cancer Brother    • Heart disease Brother    • Hypertension Brother    • Malig Hyperthermia Neg Hx        Review of Systems   Constitutional: Negative for chills, fever, malaise/fatigue, weight gain and weight loss.   Cardiovascular:        SEE HPI   Respiratory: Positive for snoring. Negative for cough and wheezing.    Hematologic/Lymphatic: Negative for bleeding problem. Does not bruise/bleed easily.   Musculoskeletal: Positive for joint pain and myalgias. Negative for falls.   Gastrointestinal: Negative for diarrhea, melena, nausea and vomiting.        Recent colostomy   Genitourinary: Negative for hematuria.   Psychiatric/Behavioral: Negative for altered mental status, depression and substance abuse. The patient is not nervous/anxious.           Objective:     Vitals:    06/22/21 1307   BP: 128/84   BP Location: Right arm   Cuff Size: Adult   Pulse: 83   Weight: 107 kg (235 lb)   Height: 182.9 cm (72\")     Body mass index is 31.87 kg/m².      PHYSICAL EXAM:  Constitutional:       General: Not in acute distress.     Appearance: Well-developed. Not diaphoretic.   Eyes:      Pupils: Pupils are equal, round, and reactive to light.   HENT:      Head: Normocephalic and atraumatic.   Neck:      Vascular: No carotid bruit or JVD.   Pulmonary:      Effort: Pulmonary effort is normal. No respiratory distress.      Breath sounds: Normal breath sounds. No wheezing. No rales.   Cardiovascular:      Normal rate. Regular rhythm.      Murmurs: There is no murmur.      No gallop. No click. No rub.      Comments: Legs currently wrapped after recent lymph node dissection  Edema:     Peripheral edema absent.   Abdominal:      General: Bowel sounds are normal. There is no distension.      Palpations: Abdomen is soft.   Musculoskeletal: " Normal range of motion.         General: No tenderness or deformity.      Cervical back: Neck supple. Skin:     General: Skin is warm and dry.      Findings: No erythema or rash.   Neurological:      Mental Status: Alert and oriented to person, place, and time.   Psychiatric:         Behavior: Behavior normal.         Judgment: Judgment normal.             ECG 12 Lead    Date/Time: 6/22/2021 1:14 PM  Performed by: Yaneli Krishna DNP, APRN  Authorized by: Yaneli Krishna DNP, APRN   Comparison: compared with previous ECG from 4/15/2021  Rhythm: sinus rhythm  Rate: normal  BPM: 83  Comments: No significant changes from previous EKG              Assessment:       Diagnosis Plan   1. Benign essential hypertension     2. Coronary artery disease involving coronary bypass graft of native heart without angina pectoris     3. Thoracic aortic aneurysm without rupture (CMS/HCC)     4. Hypersomnolence  Home Sleep Study         Plan:     1. Hypertension: We will add back in his half a tablet of the metoprolol XL and have him call with some updated heart rate and blood pressure readings in a few days.  If blood pressure still high then will increase up to a full tablet.  He tolerated metoprolol XL well in the past.  We also discussed proper technique for checking electronic blood pressure at home and his [employer] blood pressure cuff was checked in the office today and does appear accurate compared with the manual reading.  His daughter who is a pediatric nurse can also check a manual if needed.  2. Thoracic aortic ectasia: We will look at rechecking CT imaging next visit, per last office note.  3. Coronary artery calcification: With normal stress test without evidence of ischemia 3/2021.  Recommend aggressive risk factor modification.  4. Dyslipidemia: PCP managing.  LDL goal less than 70.  5. Borderline dilated iliac arteries: We will follow via CT imaging, per last office note.  6. Lung nodule: Be managed by outside  provider  7. Recurrent anal squamous cell carcinoma: Status post abdominal perineal resection with new colostomy placement and more recent lymph node dissection with results pending  8. Questionable history of carotid artery disease: He is to call and schedule vascular screening in a couple of months after he recovers from surgery.  Currently asymptomatic.  9. Suspected sleep apnea: Patient's daughter who is a pediatric nurse has been staying with him and his wife as he recovers.  She reports witnessed apnea.  Will order home sleep study.    Patient to keep October follow-up with Dr. Bui as scheduled or follow-up sooner if needed for any new, recurrent, or worsening symptoms or other issues or concerns.  Discussed in detail signs/symptoms that warrant sooner call or follow-up to the office.        Records reviewed including but not limited to 3/2021 echo, 3/2012 and stress test, 4/2021 EKG.           Your medication list          Accurate as of June 22, 2021  1:49 PM. If you have any questions, ask your nurse or doctor.            CONTINUE taking these medications      Instructions Last Dose Given Next Dose Due   acetaminophen 325 MG tablet  Commonly known as: TYLENOL      Take 2 tablets by mouth Every 6 (Six) Hours As Needed for Mild Pain .       allopurinol 100 MG tablet  Commonly known as: ZYLOPRIM      Take 100 mg by mouth Daily As Needed.       atorvastatin 40 MG tablet  Commonly known as: LIPITOR      Take 40 mg by mouth Daily.       bacitracin 500 UNIT/GM ointment      Apply  topically to the appropriate area as directed Daily.       clobetasol 0.05 % cream  Commonly known as: TEMOVATE      Apply 1 application topically to the appropriate area as directed 2 (Two) Times a Day As Needed.       cyclobenzaprine 10 MG tablet  Commonly known as: FLEXERIL      Take 10 mg by mouth 3 (Three) Times a Day As Needed.       gabapentin 300 MG capsule  Commonly known as: NEURONTIN      Take 300 mg by mouth Every 8 (Eight)  Hours.       hydroCHLOROthiazide 12.5 MG tablet  Commonly known as: HYDRODIURIL      Take 12.5 mg by mouth Daily.       indomethacin SR 75 MG CR capsule  Commonly known as: INDOCIN SR      Take 75 mg by mouth 2 (Two) Times a Day With Meals. HOLD PRIOR TO  PER MD INSTRUCTIONS       losartan 100 MG tablet  Commonly known as: COZAAR      Take 100 mg by mouth Daily.       metoprolol succinate XL 25 MG 24 hr tablet  Commonly known as: TOPROL-XL      Take 0.5 tablets by mouth Daily for 30 days.       traMADol 50 MG tablet  Commonly known as: ULTRAM      Take 50 mg by mouth As Needed for Moderate Pain .       zolpidem 5 MG tablet  Commonly known as: AMBIEN      Take 5 mg by mouth At Night As Needed.          STOP taking these medications    losartan-hydrochlorothiazide 50-12.5 MG per tablet  Commonly known as: HYZAAR  Stopped by: Yaneli Krishna DNP, APRN               I did NOT stop the losartan-HCTZ.  Patient's medication list was updated to reflect medications they are currently taking including medication changes made by other providers.            Thanks,    Yaneli Krishna DNP, APRN  06/22/2021         Dictated utilizing Dragon dictation

## 2021-06-23 ENCOUNTER — HOME CARE VISIT (OUTPATIENT)
Dept: HOME HEALTH SERVICES | Facility: HOME HEALTHCARE | Age: 76
End: 2021-06-23

## 2021-06-23 PROCEDURE — G0151 HHCP-SERV OF PT,EA 15 MIN: HCPCS

## 2021-06-24 ENCOUNTER — HOME CARE VISIT (OUTPATIENT)
Dept: HOME HEALTH SERVICES | Facility: HOME HEALTHCARE | Age: 76
End: 2021-06-24

## 2021-06-24 VITALS
SYSTOLIC BLOOD PRESSURE: 120 MMHG | DIASTOLIC BLOOD PRESSURE: 78 MMHG | RESPIRATION RATE: 18 BRPM | OXYGEN SATURATION: 96 % | TEMPERATURE: 97.5 F | HEART RATE: 66 BPM

## 2021-06-24 VITALS — SYSTOLIC BLOOD PRESSURE: 120 MMHG | HEART RATE: 66 BPM | OXYGEN SATURATION: 98 % | DIASTOLIC BLOOD PRESSURE: 80 MMHG

## 2021-06-24 PROCEDURE — G0152 HHCP-SERV OF OT,EA 15 MIN: HCPCS

## 2021-06-24 NOTE — HOME HEALTH
Mr. Granados is a pleasant 75-year old patient history of rectal cancer and recently underwent rt groin lymphectomy. He lives in 2-story home with spouse who is very supportive and assisting as needed. Prior surgery patient was able to ambulate indoor and outdoor without assistive device.    He presents with generalized weakness and requires assistance in sit to stand. Able to ambulate short distances indoor with std cane, sba/cga and occasional wall/furniture support, showing short stance on rt le with mild limp. He would benefit from further skilled PT services for therapeutic/home exercise program, transfer teaching and gait training to regain prior functional skills.

## 2021-06-25 ENCOUNTER — HOME CARE VISIT (OUTPATIENT)
Dept: HOME HEALTH SERVICES | Facility: HOME HEALTHCARE | Age: 76
End: 2021-06-25

## 2021-06-25 PROCEDURE — G0299 HHS/HOSPICE OF RN EA 15 MIN: HCPCS

## 2021-06-25 NOTE — HOME HEALTH
Pt is a 75 y.o male who lives in a 2 story home with spouse.  Pt recently underwent R groin lymphectomy.    PLOF pt independent with all ADLs      Currently pt independent with feeding grooming toileting grooming and dressing except for marleni hose.  Pt requires SBA with bathing.  Pts wife maintain the home and assists with pt as needed.    Pt does not believe further OT intervention is needed at this time.    Pt has 5/5 B UE strength B UE AROM WNL

## 2021-06-28 NOTE — PROGRESS NOTES
"[facility] OUTPATIENT FOLLOW UP CLINIC VISIT    REASON FOR FOLLOW-UP:    Metastatic anal cancer    HISTORY OF PRESENT ILLNESS:  Darwin Sanchez is a 75 y.o. male who returns today for follow up of the above issue.      He had a right inguinal lymph node dissection a couple weeks ago.  He is having some difficulty recovering from this.  He continues to have a drain in place that is draining quite a bit of fluid.  He has wrapped the right lower extremity.  In addition, yesterday he developed acute gout pain in the left knee which has improved today but still causing him some problems.    REVIEW OF SYSTEMS:  Right groin pain with drainage from the surgical site.  Left knee pain.    Vitals:    06/29/21 1458   BP: 116/77   Pulse: 74   Resp: 16   Temp: 97.7 °F (36.5 °C)   TempSrc: Temporal   SpO2: 98%   Weight: 106 kg (233 lb)  Comment: yesterday stated weight   Height: 182.9 cm (72.01\")   PainSc: 0-No pain       PHYSICAL EXAMINATION:  GENERAL:  Well-developed well-nourished male; awake, alert and oriented, in no acute distress.  Sitting in a wheelchair.  Wearing a facemask.  HEAD:  Normocephalic, atraumatic.  Wearing a facemask.  EARS:  Hearing intact.  CHEST: Normal respiratory effort  ABDOMEN:  Soft, non-tender, non-distended.  Normal active bowel sounds.  No organomegaly.  EXTREMITIES: Drain in the right groin.  The right leg is wrapped.  NEUROLOGICAL:  No focal neurologic deficits.    DIAGNOSTIC DATA:  Results for orders placed or performed in visit on 06/29/21   CBC Auto Differential    Specimen: Blood   Result Value Ref Range    WBC 10.58 3.40 - 10.80 10*3/mm3    RBC 4.82 4.14 - 5.80 10*6/mm3    Hemoglobin 13.4 13.0 - 17.7 g/dL    Hematocrit 41.0 37.5 - 51.0 %    MCV 85.1 79.0 - 97.0 fL    MCH 27.8 26.6 - 33.0 pg    MCHC 32.7 31.5 - 35.7 g/dL    RDW 13.2 12.3 - 15.4 %    RDW-SD 40.7 37.0 - 54.0 fl    MPV 10.9 6.0 - 12.0 fL    Platelets 190 140 - 450 10*3/mm3    Neutrophil % 70.9 42.7 - 76.0 %    " Lymphocyte % 16.9 (L) 19.6 - 45.3 %    Monocyte % 10.0 5.0 - 12.0 %    Eosinophil % 0.9 0.3 - 6.2 %    Basophil % 0.8 0.0 - 1.5 %    Immature Grans % 0.5 0.0 - 0.5 %    Neutrophils, Absolute 7.50 (H) 1.70 - 7.00 10*3/mm3    Lymphocytes, Absolute 1.79 0.70 - 3.10 10*3/mm3    Monocytes, Absolute 1.06 (H) 0.10 - 0.90 10*3/mm3    Eosinophils, Absolute 0.10 0.00 - 0.40 10*3/mm3    Basophils, Absolute 0.08 0.00 - 0.20 10*3/mm3    Immature Grans, Absolute 0.05 0.00 - 0.05 10*3/mm3    nRBC 0.0 0.0 - 0.2 /100 WBC       IMAGING: None reviewed    ASSESSMENT:  This is a 75 y.o. male with:  *Recurrent anal squamous cell carcinoma  · History of anal cancer in 2013 treated with chemoradiation with Xeloda.  He had significant skin toxicity with that treatment.  Chronic non-healing wound after that. Biopsies in 2016 and 2018 benign.   · Worsening local changes since late 2020.  · CT imaging 2/11/2021 with a sub-6 mm pulmonary nodule within the right lower lobe with mildly enlarged bilateral common iliac nodes.  Skin defect at the anus.  · Surgical biopsy on 2/23/2021 with invasive moderately differentiated keratinizing squamous cell carcinoma.  · PET scan on 3/25/2021 with hypermetabolic activity along the stable appearing linear tract of air in the subcutaneous tissues of the right gluteal crease.  1.1 cm right inguinal lymph node increased in size and intensely hypermetabolic.  Stable 3 mm right lower lobe pulmonary nodule.  · Right inguinal lymph node biopsy on 4/2/2021 positive for metastatic squamous cell carcinoma.  · Laparoscopic abdominal perineal resection on 4/6/2021 by Dr. Ocasio and muscle flap closure by Dr. Bardales with 4.2 cm invasive poorly differentiated focally keratinizing squamous cell carcinoma with ulceration and mixed inflammation.  No lymphovascular invasion.  12 benign lymph nodes.  Sigmoid colon unremarkable with 4 benign lymph nodes.  pT2, PN0.  · He has a perineal wound that partially opened after a fall  and was taken to the OR by Dr. Bardales for debridement and re-suture on 4/15.    · He saw Dr. Zimmerman with surgical oncology.  · Right inguinal node dissection on 6/16/2021 by Dr. Zimmerman.  1 of 6 lymph nodes with metastatic squamous cell carcinoma with the largest measuring 1.1 cm.  No extracapsular extension.     *Normocytic anemia  · Hemoglobin on 4/17/2021 was 10.0 with a normal MCV.  · His hemoglobin has improved to 12.0 as of his initial consultation.  · Hemoglobin improved at 13.4 today.    *Gout pain in the left knee: He has colchicine to use and this is already improving    PLAN:  1. There is not really any data for adjuvant therapy in this situation.  If he wants to consider some chemotherapy we could consider 4-6 cycles of carboplatin and paclitaxel.  Observation with plans for palliative chemotherapy at the time of progression is certainly reasonable as well.  He is not quite ready to consider chemotherapy.  The drain in the right groin needs to be removed before we can start therapy.  He also needs to clinically improve his performance status before we would consider therapy.  I will see him back in a few weeks for follow-up to further discuss possibility of treatment.  I did give him some written information regarding carboplatin and paclitaxel today.  The treatment regimen would be for 28-day cycles, carboplatin and paclitaxel on day one with paclitaxel on days eight and fifteen.  2. I do not think there is any utility of pursuing molecular testing on the tumor at this point but this can be considered in the future should further metastatic disease develop.    I spent 30 minutes in this visit today reviewing his record, communicating with him and his family, placing orders and documenting the encounter.

## 2021-06-29 ENCOUNTER — LAB (OUTPATIENT)
Dept: LAB | Facility: HOSPITAL | Age: 76
End: 2021-06-29

## 2021-06-29 ENCOUNTER — OFFICE VISIT (OUTPATIENT)
Dept: ONCOLOGY | Facility: CLINIC | Age: 76
End: 2021-06-29

## 2021-06-29 VITALS
SYSTOLIC BLOOD PRESSURE: 116 MMHG | HEIGHT: 72 IN | WEIGHT: 233 LBS | OXYGEN SATURATION: 98 % | HEART RATE: 74 BPM | TEMPERATURE: 97.7 F | DIASTOLIC BLOOD PRESSURE: 77 MMHG | RESPIRATION RATE: 16 BRPM | BODY MASS INDEX: 31.56 KG/M2

## 2021-06-29 DIAGNOSIS — C21.0 ANAL CANCER (HCC): Primary | ICD-10-CM

## 2021-06-29 DIAGNOSIS — C21.0 ANAL CANCER (HCC): ICD-10-CM

## 2021-06-29 LAB
BASOPHILS # BLD AUTO: 0.08 10*3/MM3 (ref 0–0.2)
BASOPHILS NFR BLD AUTO: 0.8 % (ref 0–1.5)
DEPRECATED RDW RBC AUTO: 40.7 FL (ref 37–54)
EOSINOPHIL # BLD AUTO: 0.1 10*3/MM3 (ref 0–0.4)
EOSINOPHIL NFR BLD AUTO: 0.9 % (ref 0.3–6.2)
ERYTHROCYTE [DISTWIDTH] IN BLOOD BY AUTOMATED COUNT: 13.2 % (ref 12.3–15.4)
HCT VFR BLD AUTO: 41 % (ref 37.5–51)
HGB BLD-MCNC: 13.4 G/DL (ref 13–17.7)
IMM GRANULOCYTES # BLD AUTO: 0.05 10*3/MM3 (ref 0–0.05)
IMM GRANULOCYTES NFR BLD AUTO: 0.5 % (ref 0–0.5)
LYMPHOCYTES # BLD AUTO: 1.79 10*3/MM3 (ref 0.7–3.1)
LYMPHOCYTES NFR BLD AUTO: 16.9 % (ref 19.6–45.3)
MCH RBC QN AUTO: 27.8 PG (ref 26.6–33)
MCHC RBC AUTO-ENTMCNC: 32.7 G/DL (ref 31.5–35.7)
MCV RBC AUTO: 85.1 FL (ref 79–97)
MONOCYTES # BLD AUTO: 1.06 10*3/MM3 (ref 0.1–0.9)
MONOCYTES NFR BLD AUTO: 10 % (ref 5–12)
NEUTROPHILS NFR BLD AUTO: 7.5 10*3/MM3 (ref 1.7–7)
NEUTROPHILS NFR BLD AUTO: 70.9 % (ref 42.7–76)
NRBC BLD AUTO-RTO: 0 /100 WBC (ref 0–0.2)
PLATELET # BLD AUTO: 190 10*3/MM3 (ref 140–450)
PMV BLD AUTO: 10.9 FL (ref 6–12)
RBC # BLD AUTO: 4.82 10*6/MM3 (ref 4.14–5.8)
WBC # BLD AUTO: 10.58 10*3/MM3 (ref 3.4–10.8)

## 2021-06-29 PROCEDURE — 99214 OFFICE O/P EST MOD 30 MIN: CPT | Performed by: INTERNAL MEDICINE

## 2021-06-29 PROCEDURE — 85025 COMPLETE CBC W/AUTO DIFF WBC: CPT

## 2021-06-29 PROCEDURE — 36415 COLL VENOUS BLD VENIPUNCTURE: CPT

## 2021-06-29 RX ORDER — CEPHALEXIN 500 MG/1
CAPSULE ORAL
COMMUNITY
Start: 2021-06-27 | End: 2021-06-29

## 2021-06-29 RX ORDER — HYDRALAZINE HYDROCHLORIDE 100 MG/1
100 TABLET, FILM COATED ORAL DAILY
COMMUNITY
Start: 2021-06-08 | End: 2021-07-19

## 2021-06-29 RX ORDER — SIMVASTATIN 80 MG
TABLET ORAL
COMMUNITY
End: 2021-06-29 | Stop reason: SDDI

## 2021-06-29 RX ORDER — FLUCONAZOLE 200 MG/1
TABLET ORAL
COMMUNITY
End: 2021-06-29

## 2021-06-29 RX ORDER — LIDOCAINE HYDROCHLORIDE 30 MG/G
CREAM TOPICAL
COMMUNITY
End: 2021-06-29

## 2021-06-29 RX ORDER — OXYCODONE HCL 20 MG/1
TABLET, FILM COATED, EXTENDED RELEASE ORAL
COMMUNITY
End: 2021-06-29 | Stop reason: SINTOL

## 2021-06-29 RX ORDER — CHLORHEXIDINE GLUCONATE 213 G/1000ML
SOLUTION TOPICAL
COMMUNITY
End: 2021-06-29

## 2021-06-29 RX ORDER — CYCLOBENZAPRINE HCL 10 MG
TABLET ORAL
COMMUNITY
Start: 2021-06-28 | End: 2021-07-19

## 2021-06-29 RX ORDER — DESONIDE 0.5 MG/G
CREAM TOPICAL
COMMUNITY
End: 2021-06-29

## 2021-06-29 RX ORDER — COLCHICINE 0.6 MG/1
0.6 TABLET ORAL DAILY PRN
COMMUNITY
Start: 2021-06-08

## 2021-06-29 RX ORDER — TAMSULOSIN HYDROCHLORIDE 0.4 MG/1
CAPSULE ORAL
COMMUNITY
End: 2021-10-22

## 2021-06-29 RX ORDER — VALSARTAN AND HYDROCHLOROTHIAZIDE 160; 25 MG/1; MG/1
TABLET ORAL
COMMUNITY
End: 2021-06-29 | Stop reason: SDDI

## 2021-06-29 RX ORDER — ESZOPICLONE 2 MG/1
TABLET, FILM COATED ORAL
COMMUNITY
End: 2021-06-29 | Stop reason: SDDI

## 2021-06-29 RX ORDER — AMLODIPINE BESYLATE 10 MG/1
TABLET ORAL
COMMUNITY
End: 2021-06-29

## 2021-06-29 NOTE — HOME HEALTH
on call note    daughter Madie called sn stating that pt was complaining of leg being wet near the drain and that his leg was hurting more. SN made visit. CHIARA drain was clogged and line was stripped. 110 cc of nubia drainage returned. insertion site was inspected and the dressing was saturated. sn changed the dressing and cleansed the area. leg was examined with ace wrap applied from toes to mid thigh and pressure stocking reapplied.     bp 152/68  temp 98.3  hr 66  02 99    pt stated pain was 6/10 but he had taken a pain pill prior to sn arrival. pt and caregiver were instructed to monitor the drain every two hrs and to empty the drain and to call sn if any drainage leaked under the occlusive dressing or if foot became discolored. pt and caregiver stated understanding

## 2021-06-30 ENCOUNTER — HOME CARE VISIT (OUTPATIENT)
Dept: HOME HEALTH SERVICES | Facility: HOME HEALTHCARE | Age: 76
End: 2021-06-30

## 2021-06-30 PROCEDURE — G0157 HHC PT ASSISTANT EA 15: HCPCS

## 2021-06-30 PROCEDURE — G0299 HHS/HOSPICE OF RN EA 15 MIN: HCPCS

## 2021-07-07 ENCOUNTER — HOME CARE VISIT (OUTPATIENT)
Dept: HOME HEALTH SERVICES | Facility: HOME HEALTHCARE | Age: 76
End: 2021-07-07

## 2021-07-07 VITALS
HEART RATE: 67 BPM | DIASTOLIC BLOOD PRESSURE: 84 MMHG | RESPIRATION RATE: 18 BRPM | TEMPERATURE: 97.6 F | SYSTOLIC BLOOD PRESSURE: 134 MMHG | OXYGEN SATURATION: 98 %

## 2021-07-07 VITALS
TEMPERATURE: 97.8 F | SYSTOLIC BLOOD PRESSURE: 135 MMHG | HEART RATE: 78 BPM | RESPIRATION RATE: 18 BRPM | DIASTOLIC BLOOD PRESSURE: 75 MMHG | OXYGEN SATURATION: 92 %

## 2021-07-07 PROCEDURE — G0495 RN CARE TRAIN/EDU IN HH: HCPCS

## 2021-07-07 PROCEDURE — G0151 HHCP-SERV OF PT,EA 15 MIN: HCPCS

## 2021-07-07 NOTE — HOME HEALTH
Pt reports feeling well and states his main complaint is continued soreness in RLE.  Pt reports good tolerance to mobility with no falls and no recent LOB. Pt tolerated all ther ex and upright mobility well with no AD this date. PT began some ROM and soft tissue massage of RLE for pain relief.  Pt tolerated well

## 2021-07-14 ENCOUNTER — HOME CARE VISIT (OUTPATIENT)
Dept: HOME HEALTH SERVICES | Facility: HOME HEALTHCARE | Age: 76
End: 2021-07-14

## 2021-07-14 VITALS
DIASTOLIC BLOOD PRESSURE: 82 MMHG | RESPIRATION RATE: 19 BRPM | OXYGEN SATURATION: 97 % | TEMPERATURE: 97.5 F | SYSTOLIC BLOOD PRESSURE: 124 MMHG | HEART RATE: 66 BPM

## 2021-07-14 PROCEDURE — G0299 HHS/HOSPICE OF RN EA 15 MIN: HCPCS

## 2021-07-14 PROCEDURE — G0151 HHCP-SERV OF PT,EA 15 MIN: HCPCS

## 2021-07-14 NOTE — HOME HEALTH
SN greeted by patient upon arrival. Patient spouse present for visit. Patient denies new s.sx. Denies falls, uncontrolled pain, and new medications or changes to insurance. Reviewed fall prevention education. Understanding verbalized. Reviewed when to contact [facility] vs when to seek immediate medical attention. Understanding verbalized    Pt states that he is scheduled for follow.up tomorrow to assess drain removal. He states he feels ready for discharge and is eager to return to work. He reports that he has been in discussion with the Oncologist and feels his best course of action is to continue without chemotherapy and repeat a PET scan in 6mo r/t. Spouse is supportive and states it appears to being a slow metastisizing cancer if it returns but states she would like patient to continue with [facility] and complete PT and see through drain removal and patient is agreeable. Possible discharge next visit if drain is removed.

## 2021-07-15 VITALS
OXYGEN SATURATION: 96 % | HEART RATE: 80 BPM | DIASTOLIC BLOOD PRESSURE: 78 MMHG | TEMPERATURE: 97 F | SYSTOLIC BLOOD PRESSURE: 130 MMHG

## 2021-07-15 VITALS
DIASTOLIC BLOOD PRESSURE: 66 MMHG | OXYGEN SATURATION: 98 % | SYSTOLIC BLOOD PRESSURE: 108 MMHG | TEMPERATURE: 97.6 F | RESPIRATION RATE: 18 BRPM | HEART RATE: 66 BPM

## 2021-07-15 NOTE — HOME HEALTH
Mr. Sanchez is independent and compliant with illustrated home exercise program to [facility]. He has also met transfer and ambulation goals ahead of plan and requested PT discharge this visit. Patient has f/u appt with MD tomorrow

## 2021-07-19 ENCOUNTER — OFFICE VISIT (OUTPATIENT)
Dept: SURGERY | Facility: CLINIC | Age: 76
End: 2021-07-19

## 2021-07-19 ENCOUNTER — TELEPHONE (OUTPATIENT)
Dept: ONCOLOGY | Facility: CLINIC | Age: 76
End: 2021-07-19

## 2021-07-19 VITALS
HEART RATE: 67 BPM | HEIGHT: 72 IN | SYSTOLIC BLOOD PRESSURE: 136 MMHG | DIASTOLIC BLOOD PRESSURE: 80 MMHG | WEIGHT: 226.9 LBS | OXYGEN SATURATION: 100 % | BODY MASS INDEX: 30.73 KG/M2 | TEMPERATURE: 97.2 F

## 2021-07-19 DIAGNOSIS — Z85.048 HISTORY OF ANAL CANCER: Primary | ICD-10-CM

## 2021-07-19 PROCEDURE — 99212 OFFICE O/P EST SF 10 MIN: CPT | Performed by: COLON & RECTAL SURGERY

## 2021-07-19 RX ORDER — VALSARTAN AND HYDROCHLOROTHIAZIDE 320; 25 MG/1; MG/1
TABLET, FILM COATED ORAL
COMMUNITY
End: 2021-07-19

## 2021-07-19 RX ORDER — CEPHALEXIN 500 MG/1
CAPSULE ORAL
COMMUNITY
End: 2021-07-19

## 2021-07-19 RX ORDER — ESZOPICLONE 3 MG/1
TABLET, FILM COATED ORAL
COMMUNITY
End: 2021-07-19

## 2021-07-19 RX ORDER — CAPECITABINE 500 MG/1
TABLET, FILM COATED ORAL
COMMUNITY
End: 2021-07-19

## 2021-07-19 RX ORDER — VALSARTAN AND HYDROCHLOROTHIAZIDE 160; 12.5 MG/1; MG/1
TABLET, FILM COATED ORAL
COMMUNITY
End: 2021-07-19

## 2021-07-19 NOTE — PROGRESS NOTES
"Darwin Sanchez is a 75 y.o. male in for follow up of History of anal cancer     Hx of anal SCC. S/P APR (04/06/2021) with plastic surgery gracilis flap closure (04/15/2021)  Pt was recently seen by Dr. Mcfadden and Erasmo who recommended 28-day cycles with carboplatin and paclitaxel. Pt is unsure if he wants to proceed with this treatment.   CHIARA drain in the right groin was pulled last week.   Good energy level    /80 (BP Location: Left arm, Patient Position: Sitting, Cuff Size: Adult)   Pulse 67   Temp 97.2 °F (36.2 °C) (Temporal)   Ht 182.9 cm (72\")   Wt 103 kg (226 lb 14.4 oz)   SpO2 100%   BMI 30.77 kg/m²   Body mass index is 30.77 kg/m².      PE:  Physical Exam  Constitutional:       General: He is not in acute distress.     Appearance: He is well-developed.   HENT:      Head: Normocephalic and atraumatic.   Abdominal:      General: There is no distension.      Palpations: Abdomen is soft.   Genitourinary:     Comments: Perianal exam: Well healed scars from recent gracilis flap closure. No evidence of dysplasia or malignancy.   Musculoskeletal:         General: Normal range of motion.   Neurological:      Mental Status: He is alert.   Psychiatric:         Thought Content: Thought content normal.         Assessment:   1. History of anal cancer    - S/P APR (04/06/2021) with plastic surgery gracilis flap closure (04/15/2021)    Plan:  - Follow up with Dr. Mcfadden to schedule PET scan   - Follow up in 3 months for reevaluation      Scribed for Makeda Ocasio MD by Genesis Seals PA-C 7/19/2021  09:49 EDT   This patient was evaluated by me, recommendations made, documentation reviewed, edited, and revised by me, Makeda Ocasio MD        "

## 2021-07-19 NOTE — TELEPHONE ENCOUNTER
----- Message from Mira Lorenz sent at 2021  1:05 PM EDT -----  [phone]  Pt calling to cancel upcoming appt to get chemo. Thanks, Octavio

## 2021-07-20 ENCOUNTER — APPOINTMENT (OUTPATIENT)
Dept: LAB | Facility: HOSPITAL | Age: 76
End: 2021-07-20

## 2021-07-21 ENCOUNTER — HOME CARE VISIT (OUTPATIENT)
Dept: HOME HEALTH SERVICES | Facility: HOME HEALTHCARE | Age: 76
End: 2021-07-21

## 2021-07-26 ENCOUNTER — HOSPITAL ENCOUNTER (OUTPATIENT)
Dept: SLEEP MEDICINE | Facility: HOSPITAL | Age: 76
Discharge: HOME | End: 2021-07-26
Admitting: NURSE PRACTITIONER

## 2021-07-26 DIAGNOSIS — G47.10 HYPERSOMNOLENCE: ICD-10-CM

## 2021-07-26 PROCEDURE — 95806 SLEEP STUDY UNATT&RESP EFFT: CPT | Performed by: INTERNAL MEDICINE

## 2021-07-26 PROCEDURE — 95806 SLEEP STUDY UNATT&RESP EFFT: CPT

## 2021-07-27 NOTE — PROGRESS NOTES
"[facility] OUTPATIENT FOLLOW UP CLINIC VISIT    REASON FOR FOLLOW-UP:    Metastatic anal cancer    HISTORY OF PRESENT ILLNESS:  Darwin Sanchez is a 75 y.o. male who returns today for follow up of the above issue.      He followed up with Dr. Ocasio on 7/19 with a satisfactory examination and no clinical evidence of recurrence.    He has improved quite a bit over the past couple of months.  Energy level is much better.  He is walking a lot more.  He continues to have some discomfort on the right thigh but this is slowly improving.    REVIEW OF SYSTEMS: As per the HPI    Vitals:    07/29/21 0832   BP: 157/95   Pulse: 76   Resp: 16   Temp: 98.2 °F (36.8 °C)   TempSrc: Temporal   SpO2: 97%   Weight: 106 kg (234 lb 3.2 oz)   Height: 182.9 cm (72.01\")   PainSc: 0-No pain  Comment: anal cancer       PHYSICAL EXAMINATION:  General:  No acute distress, awake, alert and oriented  Skin:  Warm and dry, no visible rash  HEENT:  Normocephalic/atraumatic.  Wearing a facemask.  Chest:  Normal respiratory effort  Abdomen: Ostomy present  Extremities:  No visible clubbing, cyanosis, or edema  Neuro/psych:  Grossly non-focal.  Normal mood and affect.      DIAGNOSTIC DATA:  CBC & Differential (06/29/2021 14:37)      IMAGING: None reviewed    ASSESSMENT:  This is a 75 y.o. male with:  *Recurrent anal squamous cell carcinoma  · History of anal cancer in 2013 treated with chemoradiation with Xeloda.  He had significant skin toxicity with that treatment.  Chronic non-healing wound after that. Biopsies in 2016 and 2018 benign.   · Worsening local changes since late 2020.  · CT imaging 2/11/2021 with a sub-6 mm pulmonary nodule within the right lower lobe with mildly enlarged bilateral common iliac nodes.  Skin defect at the anus.  · Surgical biopsy on 2/23/2021 with invasive moderately differentiated keratinizing squamous cell carcinoma.  · PET scan on 3/25/2021 with hypermetabolic activity along the stable appearing linear " tract of air in the subcutaneous tissues of the right gluteal crease.  1.1 cm right inguinal lymph node increased in size and intensely hypermetabolic.  Stable 3 mm right lower lobe pulmonary nodule.  · Right inguinal lymph node biopsy on 4/2/2021 positive for metastatic squamous cell carcinoma.  · Laparoscopic abdominal perineal resection on 4/6/2021 by Dr. Ocasio and muscle flap closure by Dr. Bardales with 4.2 cm invasive poorly differentiated focally keratinizing squamous cell carcinoma with ulceration and mixed inflammation.  No lymphovascular invasion.  12 benign lymph nodes.  Sigmoid colon unremarkable with 4 benign lymph nodes.  pT2, PN0.  · He has a perineal wound that partially opened after a fall and was taken to the OR by Dr. Bardales for debridement and re-suture on 4/15.    · He saw Dr. Zimmerman with surgical oncology.  · Right inguinal node dissection on 6/16/2021 by Dr. Zimmerman.  1 of 6 lymph nodes with metastatic squamous cell carcinoma with the largest measuring 1.1 cm.  No extracapsular extension.  · He was seen back on 6/29/2021 and offered adjuvant therapy with carboplatin and paclitaxel.     *Normocytic anemia  · Hemoglobin on 4/17/2021 was 10.0 with a normal MCV.  · His hemoglobin has improved to 12.0 as of his initial consultation.  · Hemoglobin improved at 13.4 as of 6/29/2021.    *Gout pain in the left knee: This has resolved    PLAN:  1. He is not interested in adjuvant chemotherapy and prefers observation at this time  2. Therefore, surveillance CT imaging in about 2 months which will be about 6 months after his last imaging.  I will see him back after that for review with a CBC and CMP.  Plan surveillance imaging every 6 months at this point.  3. If progression, consider carboplatin and paclitaxel with molecular testing to be requested at that time as well.

## 2021-07-28 ENCOUNTER — HOME CARE VISIT (OUTPATIENT)
Dept: HOME HEALTH SERVICES | Facility: HOME HEALTHCARE | Age: 76
End: 2021-07-28

## 2021-07-29 ENCOUNTER — TELEPHONE (OUTPATIENT)
Dept: CARDIOLOGY | Facility: CLINIC | Age: 76
End: 2021-07-29

## 2021-07-29 ENCOUNTER — APPOINTMENT (OUTPATIENT)
Dept: LAB | Facility: HOSPITAL | Age: 76
End: 2021-07-29

## 2021-07-29 ENCOUNTER — OFFICE VISIT (OUTPATIENT)
Dept: ONCOLOGY | Facility: CLINIC | Age: 76
End: 2021-07-29

## 2021-07-29 VITALS
HEART RATE: 76 BPM | BODY MASS INDEX: 31.72 KG/M2 | HEIGHT: 72 IN | WEIGHT: 234.2 LBS | SYSTOLIC BLOOD PRESSURE: 157 MMHG | TEMPERATURE: 98.2 F | RESPIRATION RATE: 16 BRPM | OXYGEN SATURATION: 97 % | DIASTOLIC BLOOD PRESSURE: 95 MMHG

## 2021-07-29 DIAGNOSIS — C21.0 ANAL CANCER (HCC): Primary | ICD-10-CM

## 2021-07-29 PROCEDURE — 99213 OFFICE O/P EST LOW 20 MIN: CPT | Performed by: INTERNAL MEDICINE

## 2021-08-01 ENCOUNTER — HOME CARE VISIT (OUTPATIENT)
Dept: HOME HEALTH SERVICES | Facility: HOME HEALTHCARE | Age: 76
End: 2021-08-01

## 2021-08-05 ENCOUNTER — OFFICE VISIT (OUTPATIENT)
Dept: SLEEP MEDICINE | Facility: HOSPITAL | Age: 76
End: 2021-08-05

## 2021-08-05 VITALS — BODY MASS INDEX: 31.97 KG/M2 | HEIGHT: 72 IN | OXYGEN SATURATION: 98 % | HEART RATE: 77 BPM | WEIGHT: 236 LBS

## 2021-08-05 DIAGNOSIS — G47.33 OSA (OBSTRUCTIVE SLEEP APNEA): Primary | ICD-10-CM

## 2021-08-05 PROCEDURE — G0463 HOSPITAL OUTPT CLINIC VISIT: HCPCS

## 2021-08-05 PROCEDURE — 99204 OFFICE O/P NEW MOD 45 MIN: CPT | Performed by: INTERNAL MEDICINE

## 2021-08-05 NOTE — PROGRESS NOTES
[facility]ult[facility]       Reason for Consultation: ELISHA    Patient Care Team:  Niko Patrick MD as PCP - General (Internal Medicine)  Livier Bui MD as Consulting Physician (Cardiology)  Makeda Ocasio MD as Consulting Physician (Colon and Rectal Surgery)  Franco Mcfadden MD as Consulting Physician (Hematology and Oncology)  Navi Cazares MD as Consulting Physician (Sleep Medicine)    Chief complaint: ELISHA    History of present illness:    Thank you for asking me to see your patient.  The patient is a 75 y.o. male who has a history of hypertension, hyperlipidemia, psoriasis, rheumatoid arthritis, squamous cell anal cancer status post excision in 2013 with radiation and chemotherapy, S/P APR (04/06/2021) with plastic surgery gracilis flap closure (04/15/2021). The patient's daughter, pediatric RN, has reported snoring and witnessed apnea. Cardiology requested a home sleep study.    Home sleep study performed 7/26/2021.  Moderate ELISHA with AHI 23 events per hour noted.  No sleep-related hypoxia present.  Sleep evaluation requested.     The patient reports trouble going to sleep for 10+ years. The patient goes to bed between 8 and 9 PM and awakens at 6 AM.  He states it will take him 2 hours to fall asleep.  He takes Ambien 5 mg 30 minutes prior to bedtime; averages 5 days a week.  He states he is ready for the day. He denies taking naps. On weekends, he will take a nap. The patient's [place] Sleepiness Scale is normal at 4. The patient has gained 20 pounds in the last several years. The patient will have a dry mouth in the morning. He has nocturnal pain. As stated he has problems falling asleep and difficulty staying asleep with frequent awakenings. He uses the restroom 3 times during the nighttime.    Review of Systems:    A complete review of systems was done and all were negative with the exception of the above    History:  Past Medical History:   Diagnosis Date   • Anxiety     • Arthritis    • CAD (coronary artery disease)    • Carotid artery occlusion 07/06/2019   • Chest pain 02/2017   • Coronavirus infection 03/2020   • Dyspnea 04/2017   • ED (erectile dysfunction)    • Fish hook injury of finger 09/04/2017    RIGHT INDEX FINGER, SEEN AT [facility] ER   • Gout    • Gouty arthropathy 7/6/2019   • History of 2019 novel coronavirus disease (COVID-19) 03/2020    PER PCP-ANTIBODY TEST   • History of chemotherapy    • History of radiation therapy    • Hyperlipidemia 7/6/2019   • Hypertension    • Impaired fasting glucose 7/6/2019   • Lymph node enlargement 03/2021   • OA (osteoarthritis)     MULTIPLE SITES   • ELISHA (obstructive sleep apnea) 07/26/2021    Home sleep study.  Moderate ELISHA with AHI 23 events per hour.  No sleep-related hypoxia noted.   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   • Radiation dermatitis    • Rectal bleeding     WOUND, TO HAVE BIOPSIED PER DR MARCH   • Rheumatoid arthritis (CMS/[facility])    • SCC (squamous cell carcinoma) 04/2020    ANAL, S/P EXCISION AROUND 2013, HAD RADIATION AND CHEMO   • Syncope and collapse 04/15/2021    ADMITTED TO [facility]   • Thoracic aortic aneurysm without rupture (CMS/HCC) 3/20/2021   • Uses hearing aid     BILATERAL   • Wound dehiscence 4/15/2021   ,   Past Surgical History:   Procedure Laterality Date   • ABDOMINAL PERINEAL RESECTION N/A 4/6/2021    Procedure: ABDOMINAL PERINEAL RESECTION LAPAROSCOPIC WITH DAVINCI ROBOT;  Surgeon: Makeda March MD;  Location: [facility];  Service: [facility];  Laterality: N/A;   • BREAST RECONSTRUCTION WITH LATISSIMUS MUSCLE FLAP AND IMPLANT INSERTION Bilateral 4/6/2021    Procedure: BILATERAL GLUTEAL ADVANCEMENT FLAP AND GRACILIS MUSCLE FLAP;  Surgeon: Mark Bardales MD;  Location: [facility] OR;  Service: Plastics;  Laterality: Bilateral;   • COLONOSCOPY  2018   • CONDYLOMA REMOVAL N/A 2/23/2021    Procedure:  PERINEAL/ANAL LESION EXCISION/FULGURATION;  Surgeon: Makeda Ocasio MD;  Location: [facility];  Service: General;  Laterality: N/A;   • EXAM UNDER ANESTHESIA, RECTAL BIOPSY  04/02/2021   • KNEE ARTHROSCOPY Bilateral     OVER 35 YRS AGO, DR. CACERES   • MUSCLE FLAP N/A 4/15/2021    Procedure: PERINEUM WOUND DEBRIDEMENT AND COMPLEX CLOSURE;  Surgeon: Mark Bardales MD;  Location: [facility];  Service: Plastics;  Laterality: N/A;   • RECTAL EXAMINATION UNDER ANESTHESIA N/A 03/09/2018    WITH BIOPSIES OF ANUS, DR. LINA ISIDRO AT [facility]   • SKIN SURGERY  2013   • SOFT TISSUE BIOPSY  04/02/2021    GROIN   • SQUAMOUS CELL CARCINOMA EXCISION N/A 09/23/2016    DIETER RECTAL AREA, DR. LINA ISIDRO AT [facility]   • TOTAL KNEE ARTHROPLASTY Right 10/17/2018    Procedure: TOTAL KNEE ARTHROPLASTY;  Surgeon: Cooper Ruby MD;  Location: [facility];  Service: Orthopedics   ,   Family History   Problem Relation Age of Onset   • Heart disease Mother    • Hypertension Mother    • Arthritis Mother    • Osteoporosis Mother    • Heart disease Father    • Hypertension Father    • Cancer Father         prostate.   • Arthritis Father    • Heart attack Father    • Hyperlipidemia Father    • Osteoporosis Father    • Aneurysm Brother    • Hypertension Brother    • Cancer Brother    • Lung cancer Brother    • Heart disease Brother    • Hypertension Brother    • Malig Hyperthermia Neg Hx     and   Social History     Socioeconomic History   • Marital status:      Spouse name: Mayra Fisher   • Number of children: Not on file   • Years of education: Not on file   • Highest education level: Not on file   Tobacco Use   • Smoking status: Never Smoker   • Smokeless tobacco: Never Used   Vaping Use   • Vaping Use: Never used   Substance and Sexual Activity   • Alcohol use: No   • Drug use: No   • Sexual activity: Yes     Comment: .     E-cigarette/Vaping   • E-cigarette/Vaping Use Never User    • Passive Exposure No   "  • Counseling Given No      E-cigarette/Vaping Substances     E-cigarette/Vaping Devices        Social History: 2 cups of coffee a day    Allergies:  Hydrocodone-acetaminophen and Hydrocodone     Medication Review: His list was reviewed.  Ambien 5 mg 30 minutes prior to bedtime averaging 5 nights per week    Vital Signs:    Vitals:    08/05/21 0830   Pulse: 77   SpO2: 98%   Weight: 107 kg (236 lb)   Height: 182.9 cm (72\")      Body mass index is 32.01 kg/m².         Physical Exam:    Constitutional:  Well developed 75 y.o. male that appears in no apparent distress.  Awake & oriented times 3.  Normal mood with normal recent and remote memory and normal judgement.  Eyes:  Conjunctivae normal.  Oropharynx: Moist mucous membranes without exudate and a large tongue and class III Mallampati airway, patient wearing a facemask  Neck: Trachea midline  Respiratory: Effort is not labored  Cardiovascular: Radial pulse regular  Musculoskeletal: Gait appears normal, no digital clubbing evident, trace pre-tibial edema    Results Review: I reviewed the results of the home sleep study with the patient.    Impression:   Moderate obstructive sleep apnea without sleep-related hypoxia by home sleep study 7/26/2021.  The patient has no significant complaints of hypersomnolence.  Circadian rhythm sleep disorder, delayed sleep phase type    Plan:  Good sleep hygiene measures should be maintained.  Weight loss would be beneficial in this patient who is obese (Body mass index is 32.01 kg/m².)     Pathophysiology of ELISHA described to the patient.  Cardiovascular complications of untreated ELISHA also reviewed.      After reviewing all with the patient, I would recommend auto titrating CPAP between 7 and 18 cm water pressure.  An appropriate interface should be selected.  Set up will be arranged.    Due to circadian rhythm sleep disorder, delayed sleep phase type, I would also recommend melatonin 4 hours prior to his desired sleep time.  Dosing " should be maximized to 20 mg.  If he takes 20 mg for 1 week without improvement, melatonin may be discontinued.    I answered all of the patient's questions.  Set up will be arranged and I will see the patient back in 2 months to review downloads and to assure compliance.    Thank you for requesting me to assist in this patient's care.    Navi Cazares MD  Sleep Medicine  08/07/21  09:29 EDT

## 2021-08-07 PROBLEM — G47.33 OSA (OBSTRUCTIVE SLEEP APNEA): Status: ACTIVE | Noted: 2021-07-26

## 2021-09-21 PROBLEM — R06.00 DYSPNEA: Status: ACTIVE | Noted: 2017-04-01

## 2021-09-21 PROBLEM — R07.9 CHEST PAIN: Status: ACTIVE | Noted: 2017-02-01

## 2021-09-21 PROBLEM — C44.92 SCC (SQUAMOUS CELL CARCINOMA): Status: ACTIVE | Noted: 2020-04-01

## 2021-09-21 PROBLEM — S69.90XA: Status: ACTIVE | Noted: 2017-09-04

## 2021-09-21 PROBLEM — B34.2 CORONAVIRUS INFECTION: Status: ACTIVE | Noted: 2020-03-01

## 2021-09-21 PROBLEM — R59.9 LYMPH NODE ENLARGEMENT: Status: ACTIVE | Noted: 2021-03-01

## 2021-09-21 PROBLEM — E78.5 HYPERLIPIDEMIA: Status: ACTIVE | Noted: 2019-07-06

## 2021-09-21 PROBLEM — Z86.16 HISTORY OF 2019 NOVEL CORONAVIRUS DISEASE (COVID-19): Status: ACTIVE | Noted: 2020-03-01

## 2021-10-06 ENCOUNTER — HOSPITAL ENCOUNTER (OUTPATIENT)
Dept: PET IMAGING | Facility: HOSPITAL | Age: 76
Discharge: HOME | End: 2021-10-06
Admitting: INTERNAL MEDICINE

## 2021-10-06 DIAGNOSIS — C21.0 ANAL CANCER (HCC): ICD-10-CM

## 2021-10-06 LAB — CREAT BLDA-MCNC: 1.1 MG/DL (ref 0.6–1.3)

## 2021-10-06 PROCEDURE — 25010000002 IOPAMIDOL 61 % SOLUTION: Performed by: INTERNAL MEDICINE

## 2021-10-06 PROCEDURE — 0 DIATRIZOATE MEGLUMINE & SODIUM PER 1 ML: Performed by: INTERNAL MEDICINE

## 2021-10-06 PROCEDURE — 82565 ASSAY OF CREATININE: CPT

## 2021-10-06 PROCEDURE — 74177 CT ABD & PELVIS W/CONTRAST: CPT

## 2021-10-06 PROCEDURE — 71260 CT THORAX DX C+: CPT

## 2021-10-06 RX ADMIN — DIATRIZOATE MEGLUMINE AND DIATRIZOATE SODIUM 30 ML: 660; 100 LIQUID ORAL; RECTAL at 08:49

## 2021-10-06 RX ADMIN — IOPAMIDOL 85 ML: 612 INJECTION, SOLUTION INTRAVENOUS at 09:55

## 2021-10-10 NOTE — PROGRESS NOTES
[facility] GROUP OUTPATIENT FOLLOW UP CLINIC VISIT    REASON FOR FOLLOW-UP:    Metastatic anal cancer    HISTORY OF PRESENT ILLNESS:  Darwin Sanchez is a 75 y.o. male who returns today for follow up of the above issue.      He is overall doing very well.  He remains very active.  His only complaint is some mild right lower extremity edema that causes some leg stiffness but no pain at the end of a busy day when he has been on his feet a lot.  He was referred to physical therapy.  He tried a knee-high compression stocking which did not really help much.      REVIEW OF SYSTEMS: As per the HPI    There were no vitals filed for this visit.    PHYSICAL EXAMINATION:  General:  No acute distress, awake, alert and oriented  Skin:  Warm and dry, no visible rash  HEENT:  Normocephalic/atraumatic.  Wearing a facemask.  Chest:  Normal respiratory effort.  Lungs clear to auscultation bilaterally.  Heart: Regular rate and rhythm  Abdomen: Ostomy present  Extremities: Trace right lower extremity edema  Neuro/psych:  Grossly non-focal.  Normal mood and affect.      DIAGNOSTIC DATA:  CBC & Differential (10/11/2021 09:16)      IMAGING:   CT Chest With Contrast Diagnostic (10/06/2021 10:05)  CT Abdomen Pelvis With Contrast (10/06/2021 10:05)    CT images personally reviewed. No obvious recurrence. Small fluid collection at the operative site.     ASSESSMENT:  This is a 75 y.o. male with:  *Recurrent anal squamous cell carcinoma  · History of anal cancer in 2013 treated with chemoradiation with Xeloda.  He had significant skin toxicity with that treatment.  Chronic non-healing wound after that. Biopsies in 2016 and 2018 benign.   · Worsening local changes since late 2020.  · CT imaging 2/11/2021 with a sub-6 mm pulmonary nodule within the right lower lobe with mildly enlarged bilateral common iliac nodes.  Skin defect at the anus.  · Surgical biopsy on 2/23/2021 with invasive moderately differentiated keratinizing squamous cell  carcinoma.  · PET scan on 3/25/2021 with hypermetabolic activity along the stable appearing linear tract of air in the subcutaneous tissues of the right gluteal crease.  1.1 cm right inguinal lymph node increased in size and intensely hypermetabolic.  Stable 3 mm right lower lobe pulmonary nodule.  · Right inguinal lymph node biopsy on 4/2/2021 positive for metastatic squamous cell carcinoma.  · Laparoscopic abdominal perineal resection on 4/6/2021 by Dr. Ocasio and muscle flap closure by Dr. Bardales with 4.2 cm invasive poorly differentiated focally keratinizing squamous cell carcinoma with ulceration and mixed inflammation.  No lymphovascular invasion.  12 benign lymph nodes.  Sigmoid colon unremarkable with 4 benign lymph nodes.  pT2, PN0.  · He has a perineal wound that partially opened after a fall and was taken to the OR by Dr. Bardales for debridement and re-suture on 4/15.    · He saw Dr. Zimmerman with surgical oncology.  · Right inguinal node dissection on 6/16/2021 by Dr. Zimmerman.  1 of 6 lymph nodes with metastatic squamous cell carcinoma with the largest measuring 1.1 cm.  No extracapsular extension.  · He was seen back on 6/29/2021 and offered adjuvant therapy with carboplatin and paclitaxel, which he declined.  · He followed up with Dr. Ocasio on 7/19 with a satisfactory examination and no clinical evidence of recurrence.  · CT imaging 10/6/21 without obvious recurrence. Small fluid collection in the right inguinal area with small adjacent nodes that warrant observation.     *Normocytic anemia  · Hemoglobin on 4/17/2021 was 10.0 with a normal MCV.  · His hemoglobin has improved to 12.0 as of his initial consultation.  · Hemoglobin improved at 13.4 as of 6/29/2021.  · 10/11/2021: Hemoglobin remains normal    *Mild right lower extremity edema  · Advised watching sodium intake.  Advised a thigh-high compression stocking.  He has been referred to physical therapy which I agree  with.      PLAN:  1. Continue to observe the mild right inguinal adenopathy.  Plan repeat CT imaging in 4 months and I will see him back after that with a CBC and CMP.

## 2021-10-11 ENCOUNTER — LAB (OUTPATIENT)
Dept: LAB | Facility: HOSPITAL | Age: 76
End: 2021-10-11

## 2021-10-11 ENCOUNTER — OFFICE VISIT (OUTPATIENT)
Dept: ONCOLOGY | Facility: CLINIC | Age: 76
End: 2021-10-11

## 2021-10-11 VITALS
TEMPERATURE: 97.7 F | DIASTOLIC BLOOD PRESSURE: 84 MMHG | OXYGEN SATURATION: 96 % | BODY MASS INDEX: 32.95 KG/M2 | HEIGHT: 72 IN | RESPIRATION RATE: 16 BRPM | WEIGHT: 243.3 LBS | SYSTOLIC BLOOD PRESSURE: 156 MMHG | HEART RATE: 57 BPM

## 2021-10-11 DIAGNOSIS — C21.0 ANAL CANCER (HCC): ICD-10-CM

## 2021-10-11 DIAGNOSIS — C21.0 PRIMARY SQUAMOUS CELL CARCINOMA OF ANUS (HCC): Primary | ICD-10-CM

## 2021-10-11 LAB
ALBUMIN SERPL-MCNC: 4.1 G/DL (ref 3.5–5.2)
ALBUMIN/GLOB SERPL: 1.4 G/DL (ref 1.1–2.4)
ALP SERPL-CCNC: 89 U/L (ref 38–116)
ALT SERPL W P-5'-P-CCNC: 14 U/L (ref 0–41)
ANION GAP SERPL CALCULATED.3IONS-SCNC: 9.5 MMOL/L (ref 5–15)
AST SERPL-CCNC: 23 U/L (ref 0–40)
BASOPHILS # BLD AUTO: 0.05 10*3/MM3 (ref 0–0.2)
BASOPHILS NFR BLD AUTO: 0.9 % (ref 0–1.5)
BILIRUB SERPL-MCNC: 0.6 MG/DL (ref 0.2–1.2)
BUN SERPL-MCNC: 29 MG/DL (ref 6–20)
BUN/CREAT SERPL: 23 (ref 7.3–30)
CALCIUM SPEC-SCNC: 9.2 MG/DL (ref 8.5–10.2)
CHLORIDE SERPL-SCNC: 106 MMOL/L (ref 98–107)
CO2 SERPL-SCNC: 27.5 MMOL/L (ref 22–29)
CREAT SERPL-MCNC: 1.26 MG/DL (ref 0.7–1.3)
DEPRECATED RDW RBC AUTO: 46.4 FL (ref 37–54)
EOSINOPHIL # BLD AUTO: 0.09 10*3/MM3 (ref 0–0.4)
EOSINOPHIL NFR BLD AUTO: 1.6 % (ref 0.3–6.2)
ERYTHROCYTE [DISTWIDTH] IN BLOOD BY AUTOMATED COUNT: 14.3 % (ref 12.3–15.4)
GFR SERPL CREATININE-BSD FRML MDRD: 56 ML/MIN/1.73
GLOBULIN UR ELPH-MCNC: 2.9 GM/DL (ref 1.8–3.5)
GLUCOSE SERPL-MCNC: 104 MG/DL (ref 74–124)
HCT VFR BLD AUTO: 42.3 % (ref 37.5–51)
HGB BLD-MCNC: 13.5 G/DL (ref 13–17.7)
IMM GRANULOCYTES # BLD AUTO: 0.02 10*3/MM3 (ref 0–0.05)
IMM GRANULOCYTES NFR BLD AUTO: 0.4 % (ref 0–0.5)
LYMPHOCYTES # BLD AUTO: 1.4 10*3/MM3 (ref 0.7–3.1)
LYMPHOCYTES NFR BLD AUTO: 25 % (ref 19.6–45.3)
MCH RBC QN AUTO: 28.2 PG (ref 26.6–33)
MCHC RBC AUTO-ENTMCNC: 31.9 G/DL (ref 31.5–35.7)
MCV RBC AUTO: 88.3 FL (ref 79–97)
MONOCYTES # BLD AUTO: 0.56 10*3/MM3 (ref 0.1–0.9)
MONOCYTES NFR BLD AUTO: 10 % (ref 5–12)
NEUTROPHILS NFR BLD AUTO: 3.47 10*3/MM3 (ref 1.7–7)
NEUTROPHILS NFR BLD AUTO: 62.1 % (ref 42.7–76)
NRBC BLD AUTO-RTO: 0 /100 WBC (ref 0–0.2)
PLATELET # BLD AUTO: 156 10*3/MM3 (ref 140–450)
PMV BLD AUTO: 10.5 FL (ref 6–12)
POTASSIUM SERPL-SCNC: 4.2 MMOL/L (ref 3.5–4.7)
PROT SERPL-MCNC: 7 G/DL (ref 6.3–8)
RBC # BLD AUTO: 4.79 10*6/MM3 (ref 4.14–5.8)
SODIUM SERPL-SCNC: 143 MMOL/L (ref 134–145)
WBC # BLD AUTO: 5.59 10*3/MM3 (ref 3.4–10.8)

## 2021-10-11 PROCEDURE — 85025 COMPLETE CBC W/AUTO DIFF WBC: CPT

## 2021-10-11 PROCEDURE — 99214 OFFICE O/P EST MOD 30 MIN: CPT | Performed by: INTERNAL MEDICINE

## 2021-10-11 PROCEDURE — 80053 COMPREHEN METABOLIC PANEL: CPT

## 2021-10-11 PROCEDURE — 36415 COLL VENOUS BLD VENIPUNCTURE: CPT

## 2021-10-11 RX ORDER — CLONIDINE HYDROCHLORIDE 0.1 MG/1
0.1 TABLET ORAL 2 TIMES DAILY
COMMUNITY
Start: 2021-10-06 | End: 2021-10-22

## 2021-10-11 RX ORDER — CLONIDINE HYDROCHLORIDE 0.1 MG/1
TABLET ORAL
COMMUNITY
End: 2021-10-22

## 2021-10-22 ENCOUNTER — OFFICE VISIT (OUTPATIENT)
Dept: SURGERY | Facility: CLINIC | Age: 76
End: 2021-10-22

## 2021-10-22 VITALS
BODY MASS INDEX: 33.31 KG/M2 | HEIGHT: 72 IN | WEIGHT: 245.9 LBS | DIASTOLIC BLOOD PRESSURE: 84 MMHG | SYSTOLIC BLOOD PRESSURE: 112 MMHG | OXYGEN SATURATION: 98 % | HEART RATE: 61 BPM | TEMPERATURE: 97.5 F

## 2021-10-22 DIAGNOSIS — Z85.048 HISTORY OF ANAL CANCER: Primary | ICD-10-CM

## 2021-10-22 PROBLEM — C77.9: Status: ACTIVE | Noted: 2021-05-05

## 2021-10-22 PROBLEM — M25.569 KNEE PAIN: Status: ACTIVE | Noted: 2018-09-12

## 2021-10-22 PROBLEM — H91.90 HEARING LOSS: Status: ACTIVE | Noted: 2020-11-25

## 2021-10-22 PROBLEM — Z76.89 PERSONS ENCOUNTERING HEALTH SERVICES IN OTHER SPECIFIED CIRCUMSTANCES: Status: ACTIVE | Noted: 2020-12-15

## 2021-10-22 PROBLEM — Z93.3 COLOSTOMY PRESENT: Status: ACTIVE | Noted: 2021-05-05

## 2021-10-22 PROBLEM — T14.8XXA OPEN WOUND WITH COMPLICATION: Status: ACTIVE | Noted: 2020-11-30

## 2021-10-22 PROCEDURE — 99212 OFFICE O/P EST SF 10 MIN: CPT | Performed by: COLON & RECTAL SURGERY

## 2021-10-22 RX ORDER — CLONIDINE HYDROCHLORIDE 0.1 MG/1
0.1 TABLET ORAL 2 TIMES DAILY
COMMUNITY

## 2021-10-22 NOTE — PROGRESS NOTES
"Darwin Sanchez is a 75 y.o. male in for follow up of History of anal cancer  bm daily  Still not changing his own ostomy bag  No fiber   No ss  Had scans recently and followed up with Dr. Mcfadden.  Per patient, Dr. Mcfadden was pleased with his scans.    /84 (BP Location: Left arm, Patient Position: Sitting, Cuff Size: Large Adult)   Pulse 61   Temp 97.5 °F (36.4 °C)   Ht 182.9 cm (72\")   Wt 112 kg (245 lb 14.4 oz)   SpO2 98%   BMI 33.35 kg/m²   Body mass index is 33.35 kg/m².      PE:  Physical Exam  Constitutional:       General: He is not in acute distress.     Appearance: He is well-developed.   HENT:      Head: Normocephalic and atraumatic.   Abdominal:      General: There is no distension.      Palpations: Abdomen is soft.   Genitourinary:     Comments: Perineum: Skin MOON. Gluteal cleft -skin irritation  Left scrotal fold at thigh - blood with irritation  Musculoskeletal:         General: Normal range of motion.   Neurological:      Mental Status: He is alert.   Psychiatric:         Thought Content: Thought content normal.       Review of medical record: I reviewed CT scan from 10/6/2021 images and report that states there is a small fluid collection at the right inguinal lymph node dissection the other operative bed at the perineum has some thickening.  Assessment:   1. History of anal cancer       Plan:  Continue every 3 months perineum skin exam for recurrence.  Patient continuing to follow-up with Dr. Mcfadden.  Encourage patient that he has to learn how to change his colostomy bag      "

## 2021-11-02 ENCOUNTER — TREATMENT (OUTPATIENT)
Dept: PHYSICAL THERAPY | Facility: CLINIC | Age: 76
End: 2021-11-02

## 2021-11-02 DIAGNOSIS — I89.0 LYMPHEDEMA: Primary | ICD-10-CM

## 2021-11-02 PROCEDURE — 97162 PT EVAL MOD COMPLEX 30 MIN: CPT | Performed by: PHYSICAL THERAPIST

## 2021-11-02 NOTE — PROGRESS NOTES
"Physical Therapy Initial Evaluation and Plan of Care    Patient: Darwin Sanchez   : 1945  Diagnosis/ICD-10 Code:  Lymphedema [I89.0]  Referring practitioner: Niko Patrick MD  Date of Initial Visit: 2021  Today's Date: 2021  Patient seen for 1 sessions           Subjective Questionnaire: LEFS: 45/80 ( 44%)      Subjective Evaluation    History of Present Illness  Mechanism of injury: Pt is referred to therapy due to lymphedema in R LE. Pt with hx of anal ca, Dx  8 years ago had surgery, chemotherapy and radiation.  His ca came back last year. He  had surgery in April and a colostomy  bag. He had another surgery in Aug to remove a few lymph nodes and had a drainage tube for almost a month.  Reports he noticed inc swelling in upper thigh when the tube was removed.  Reports the swelling gets worse at the end of the day.  It looks pretty good in the mornings but gets worse as the day progress. He has numbness in  R thigh. He has pain in R knee, he had a total knee replacement. Has tried knee high  compression stockings but didn't seem to help.     Quality of life: good    Pain  Current pain ratin  At best pain ratin  At worst pain ratin  Quality: dull ache  Relieving factors: support, rest and ice  Aggravating factors: ambulation, prolonged positioning, standing, squatting and stairs  Progression: no change    Patient Goals  Patient goals for therapy: decreased edema           Precautions:     Objective          Functional Assessment     Comments  Obs:ambulates w/o a.d. no noticeable gait deviation     Palp: numbness  R upper thigh, medial R upper thigh tissue feels tight but no fibrosis    ROM: wfl    Strength: wfl     Circumferential measurements: in cm    Ankle: R 28, L 25  3\" above ankle:  R 26.5   L 26  Calf: R 43, L 42  Knee: R 43, L 42  Mid thigh: R 57, L 53          Assessment & Plan     Assessment  Impairments: activity intolerance, lacks appropriate home exercise program and " pain with function  Assessment details: Pt is a 74 y/o m referred to therapy due to R LE edema. He is s/p surgery/ lymph node resection, s/p chemo/ radiation due to ca.    Pt presents with swelling in R LE, changes in tissue texture , inc swelling with standing/ walking and at the end of the day.  Better with elevation and rest.  Upon initial evaluation pt exhibits the above impairments and functional limitations. Impairments affect performing normal / daily activities.  Pt will benefit from skilled physical therapy to address the swelling and improve tissue texture and reduce pain and maximize function.      Prognosis: good  Functional Limitations: walking, uncomfortable because of pain and standing  Goals  Plan Goals: STGs: in 4 weeks    1- Initiate manual lymph drainage massage and compression bandaging if indicated  2- Pt to be instructed in appropriate HEP to reduce swelling / improve ROM and improve lymphatic flow   3- Reduce swelling by 1 cm   4- Pt will report 25 % improvement in functional mobility and pain reduction       LTGs: by DC     1- Pt will be independent with self management of his condition   2- Reduce swelling by  2 cm   3- Pt will be fitted with proper compression garment and will be independent with donning/ doffing ( if compression garment is indicated )  4- Pt will report 50 % improvement and pain reduction  5- Pt will voice readiness for DC with independent program    6- Pt will have improved LEFS score compare to initial eval indicating functional improvement and pain reduction    Plan  Therapy options: will be seen for skilled physical therapy services  Planned therapy interventions: compression, functional ROM exercises, home exercise program, manual therapy and therapeutic activities  Frequency: 1x week  Duration in weeks: 12  Treatment plan discussed with: patient        See flow sheet for treatment detail    History # of Personal Factors and/or Comorbidities: MODERATE  (1-2)  Examination of Body System(s): # of elements: MODERATE (3)  Clinical Presentation: EVOLVING  Clinical Decision Making: MODERATE          Timed:         Manual Therapy:         mins  95460;     Therapeutic Exercise:         mins  85169;     Neuromuscular Russell:        mins  87134;    Therapeutic Activity:          mins  46901;     Gait Training:           mins  25884;     Ultrasound:          mins  57068;    Ionto                                   mins   70069  Self Care                            mins   30833  Canal repositioning           mins    29243      Un-Timed:  Electrical Stimulation:         mins  89557 ( );  Dry Needling          mins self-pay  Traction          mins 19429  Low Eval          Mins  10236  Mod Eval    45      Mins  25738  High Eval                            Mins  64790  Re-Eval                               mins  35525        Timed Treatment:      mins   Total Treatment:     45   mins    PT SIGNATURE: Andre Orozco PT, CLT   DATE TREATMENT INITIATED: 11/2/2021    Initial Certification  Certification Period: 1/31/2022  I certify that the therapy services are furnished while this patient is under my care.  The services outlined above are required by this patient, and will be reviewed every 90 days.     PHYSICIAN: Niko Patrick MD      DATE:     Please sign and return via fax to [phone].. Thank you, [facility].

## 2021-11-03 ENCOUNTER — APPOINTMENT (OUTPATIENT)
Dept: SLEEP MEDICINE | Facility: HOSPITAL | Age: 76
End: 2021-11-03

## 2021-11-10 ENCOUNTER — TREATMENT (OUTPATIENT)
Dept: PHYSICAL THERAPY | Facility: CLINIC | Age: 76
End: 2021-11-10

## 2021-11-10 DIAGNOSIS — I89.0 LYMPHEDEMA: Primary | ICD-10-CM

## 2021-11-10 PROCEDURE — 97110 THERAPEUTIC EXERCISES: CPT | Performed by: PHYSICAL THERAPIST

## 2021-11-10 PROCEDURE — 97140 MANUAL THERAPY 1/> REGIONS: CPT | Performed by: PHYSICAL THERAPIST

## 2021-11-10 NOTE — PROGRESS NOTES
Physical Therapy Daily Progress Note    Patient: Darwin Sanchez  : 1945  Referring practitioner: Niko Patrick MD  Today's Date: 11/10/2021    VISIT#: 2    Subjective   Pt reports: doing ok, nothing new to report. His leg feels pretty good this morning but has not been on it for long.       Objective     See Exercise, Manual, and Modality Logs for complete treatment. Initiated manual therapy, instructed in HEP.     Patient Education:    Assessment & Plan     Assessment  Assessment details: Good cele to today's treatment session. Jakob understanding of his HEP.     Plan  Plan details: Pt is going out of town for 9 days. Next appt on                       Timed:         Manual Therapy:   30      mins  25760;     Therapeutic Exercise:     15    mins  98457;     Neuromuscular Russell:        mins  60660;    Therapeutic Activity:          mins  12570;     Gait Training:           mins  94443;     Ultrasound:          mins  07209;    Ionto                                   mins   31236  Self Care                            mins   55117  Canal repositioning           mins    56995          Timed Treatment:   45   mins   Total Treatment:    45    mins    Andre Orozco PT, CLT  Physical Therapist

## 2021-11-24 ENCOUNTER — TREATMENT (OUTPATIENT)
Dept: PHYSICAL THERAPY | Facility: CLINIC | Age: 76
End: 2021-11-24

## 2021-11-24 DIAGNOSIS — I89.0 LYMPHEDEMA: Primary | ICD-10-CM

## 2021-11-24 PROCEDURE — 97110 THERAPEUTIC EXERCISES: CPT | Performed by: PHYSICAL THERAPIST

## 2021-11-24 PROCEDURE — 97140 MANUAL THERAPY 1/> REGIONS: CPT | Performed by: PHYSICAL THERAPIST

## 2021-11-24 NOTE — PROGRESS NOTES
Physical Therapy Daily Progress Note    Patient: Darwin Sanchez  : 1945  Referring practitioner: Niko Patrick MD  Today's Date: 2021    VISIT#: 3    Subjective   Pt reports: doing ok, his R leg feels about the same. Did ok on his fishing trip to [place]. [place] ok after last rx.       Objective     See Exercise, Manual, and Modality Logs for complete treatment. Continued with manual therapy and there ex as noted. Progressed with HEP.     Patient Education:    Assessment & Plan     Assessment    Assessment details: Good cele to today's rx session. Demonstrates understanding of his HEP. Good cele to manual therapy so far.           Progress per Plan of Care            Timed:         Manual Therapy:   30      mins  87688;     Therapeutic Exercise:    15     mins  02989;     Neuromuscular Russell:        mins  67055;    Therapeutic Activity:          mins  30044;     Gait Training:           mins  18885;     Ultrasound:          mins  40946;    Ionto                                   mins   35392  Self Care                            mins   00362  Canal repositioning           mins    86308    Un-Timed:  Electrical Stimulation:         mins  39739 ( );  Traction          mins 73503  Low Eval          Mins  74628  Mod Eval          Mins  52127  High Eval                            Mins  82539  Re-Eval                               mins  25282    Timed Treatment:  45    mins   Total Treatment:    45    mins    Andre Orozco PT, CLT  Physical Therapist

## 2022-01-12 ENCOUNTER — TELEPHONE (OUTPATIENT)
Dept: SURGERY | Facility: CLINIC | Age: 77
End: 2022-01-12

## 2022-01-12 NOTE — TELEPHONE ENCOUNTER
CALLED PT AND LEFT VM ON 01/12/22 TO RESCHEDULE F/U APPOINTMENT ON 01/07/22 WHICH WAS CANCELED DUE TO WEATHER.

## 2022-01-31 ENCOUNTER — HOSPITAL ENCOUNTER (OUTPATIENT)
Dept: PET IMAGING | Facility: HOSPITAL | Age: 77
Discharge: HOME | End: 2022-01-31
Admitting: INTERNAL MEDICINE

## 2022-01-31 DIAGNOSIS — C21.0 PRIMARY SQUAMOUS CELL CARCINOMA OF ANUS: ICD-10-CM

## 2022-01-31 LAB — CREAT BLDA-MCNC: 1.4 MG/DL (ref 0.6–1.3)

## 2022-01-31 PROCEDURE — 25010000002 IOPAMIDOL 61 % SOLUTION: Performed by: INTERNAL MEDICINE

## 2022-01-31 PROCEDURE — 82565 ASSAY OF CREATININE: CPT

## 2022-01-31 PROCEDURE — 0 DIATRIZOATE MEGLUMINE & SODIUM PER 1 ML: Performed by: INTERNAL MEDICINE

## 2022-01-31 PROCEDURE — 74177 CT ABD & PELVIS W/CONTRAST: CPT

## 2022-01-31 PROCEDURE — 71260 CT THORAX DX C+: CPT

## 2022-01-31 RX ADMIN — IOPAMIDOL 85 ML: 612 INJECTION, SOLUTION INTRAVENOUS at 09:45

## 2022-01-31 RX ADMIN — DIATRIZOATE MEGLUMINE AND DIATRIZOATE SODIUM 30 ML: 660; 100 LIQUID ORAL; RECTAL at 08:45

## 2022-02-07 ENCOUNTER — APPOINTMENT (OUTPATIENT)
Dept: LAB | Facility: HOSPITAL | Age: 77
End: 2022-02-07

## 2022-07-21 ENCOUNTER — DOCUMENTATION (OUTPATIENT)
Dept: PHYSICAL THERAPY | Facility: CLINIC | Age: 77
End: 2022-07-21

## 2022-07-21 NOTE — PROGRESS NOTES
Discharge Summary  Discharge Summary from Physical/Occupational Therapy Report    Patient: Darwin Sanchez   : 1945  Today's Date: 2022    Patient seen for 3 visits.    Discharge Status of Patient: See 21 treatment note for detail.    Comments : Pt has not returned for additional therapy and will be DC from our services.      Thank you for this referral to [facility].    SIGNATURE: Andre Orozco PT

## 2022-11-01 NOTE — OUTREACH NOTE
CC:  Cheng Hernandez General Surgery Week 4 Survey      Responses   [facility] patient discharged from?  [place]   Does the patient have one of the following disease processes/diagnoses(primary or secondary)?  General Surgery   Week 4 attempt successful?  Yes   Call start time  1718   Call end time  1723   Discharge diagnosis  Syncope and collapse, Perineum wound debridement and complex closure   Is patient permission given to speak with other caregiver?  No   Is the patient taking all medications as directed (includes completed medication regime)?  Yes   Has the patient kept scheduled appointments due by today?  Yes   Is the patient still receiving Home Health Services?  N/A   Psychosocial issues?  No   What is the patient's perception of their health status since discharge?  Improving   Is the patient/caregiver able to teach back steps to recovery at home?  Set small, achievable goals for return to baseline health, Rest and rebuild strength, gradually increase activity   If the patient is a current smoker, are they able to teach back resources for cessation?  Not a smoker   Is the patient/caregiver able to teach back the hierarchy of who to call/visit for symptoms/problems? PCP, Specialist, Home health nurse, Urgent Care, ED, 911  Yes   Week 4 call completed?  Yes   Would the patient like one additional call?  No   Graduated  Yes   Is the patient interested in additional calls from an ambulatory ?  NOTE:  applies to high risk patients requiring additional follow-up.  No   Did the patient feel the follow up calls were helpful during their recovery period?  Yes   Was the number of calls appropriate?  Yes   Wrap up additional comments  Patient reports that he is planned to have upcoming lymph node surgery at [facility] next month.           Lana Marks RN

## 2023-07-28 ENCOUNTER — TELEPHONE (OUTPATIENT)
Dept: SURGERY | Facility: CLINIC | Age: 78
End: 2023-07-28

## 2023-07-28 NOTE — TELEPHONE ENCOUNTER
Caller: [facility]    Relationship: DAUGHTER ON [employer]    Best call back number: [phone]    What is the best time to reach you: ANY    Who are you requesting to speak with (clinical staff, provider,  specific staff member): NON    Do you know the name of the person who called: NA    What was the call regarding:PT LAST SEEN ON 10-22-21, PT DAUGHTER CALLED TO [facility] APPT FOR HER FATHER AS SHE JUST FOUND OUT HE HAS A MASS BETWEEN HIS LEGS  ( SHE SAID WERE HE HAD A PROCEDURE DONE PREVIOUSLY) THE SIZE OF A TENNIS BALL. SHE IS NOT SURE HOW LONG IT HAS BEEN THERE.      PLEASE CALL [facility] AND [facility] APPT.    Is it okay if the provider responds through MyChart: NO        Attempted to Warm Transfer but, unable to reach the Practice:

## 2023-08-04 ENCOUNTER — OFFICE VISIT (OUTPATIENT)
Dept: SURGERY | Facility: CLINIC | Age: 78
End: 2023-08-04
Payer: MEDICARE

## 2023-08-04 VITALS
OXYGEN SATURATION: 98 % | BODY MASS INDEX: 35.76 KG/M2 | SYSTOLIC BLOOD PRESSURE: 176 MMHG | HEIGHT: 72 IN | DIASTOLIC BLOOD PRESSURE: 96 MMHG | HEART RATE: 72 BPM | WEIGHT: 264 LBS

## 2023-08-04 DIAGNOSIS — K45.8 PERINEAL HERNIA: ICD-10-CM

## 2023-08-04 DIAGNOSIS — Z85.048 HISTORY OF ANAL CANCER: Primary | ICD-10-CM

## 2023-08-04 DIAGNOSIS — K43.5 PARASTOMAL HERNIA WITHOUT OBSTRUCTION OR GANGRENE: ICD-10-CM

## 2023-08-04 RX ORDER — PREDNISONE 20 MG/1
20 TABLET ORAL DAILY
COMMUNITY
Start: 2023-07-14 | End: 2023-08-04

## 2023-08-04 NOTE — PROGRESS NOTES
"Darwin Sanchez is a 77 y.o. male in for a new lump in the perineum.  He also complains of an enlarging lump around the stoma.  On review of his medical record I noted:  His last CT noted was 01/2023 and showed stable lymph nodes. The patient has had appointments with Dr. Mcfadden and CBC in 01/2023 and 02/2023 that were canceled by the patient.    The patient reports he has been experiencing swelling in his perineum region that is exacerbated by sitting. He has started an exercise regimen that includes abduction, squats, and walking. He is unsure if the swelling is associated with his exercise regimen.     Patient reports an area in his perineum region near his inner thigh that is tender and hard. He has a history of left gracilis flap.     The patient reports his ostomy is going well. He states he is having regular bowel movements.      Patient reports 5 or 6 episodes in the afternoon of feeling cold for 1 year. He states these episodes last for 1 hour and improve when he covers himself with a blanket. He has seen his primary provider who advised him to monitor his glucose.     He reports worsening psoriasis. He is currently using clobetasol cream.    /96 (BP Location: Left arm, Patient Position: Sitting, Cuff Size: Large Adult)   Pulse 72   Ht 182.9 cm (72\")   Wt 120 kg (264 lb)   SpO2 98%   BMI 35.80 kg/mý   Body mass index is 35.8 kg/mý.      PE:  Physical Exam  Perianal exam: He has multiple small plaques of psoriasis and nothing infected. He has a reducible perineum, pelvic floor hernia that is easily reducible.  Abdomen: He has a left lower quadrant ostomy and he also has a hernia associated with that.      1. History of anal cancer         Plan:  Plan to get a CT scan to evaluate and then decide about treatment plan. He was advised to apply direct pressure to the perineum pelvic floor hernia to reduce it.       Transcribed from ambient dictation for Makeda Ocasio MD by Jerica Bowers.  08/04/23 "   12:11 EDT    Patient or patient representative verbalized consent to the visit recording.  I have personally performed the services described in this document as transcribed by the above individual, and it is both accurate and complete.

## 2023-08-15 ENCOUNTER — HOSPITAL ENCOUNTER (OUTPATIENT)
Dept: CT IMAGING | Facility: HOSPITAL | Age: 78
Discharge: HOME | End: 2023-08-15
Admitting: COLON & RECTAL SURGERY
Payer: MEDICARE

## 2023-08-15 DIAGNOSIS — Z85.048 HISTORY OF ANAL CANCER: ICD-10-CM

## 2023-08-15 LAB
CREAT BLDA-MCNC: 1.7 MG/DL (ref 0.6–1.3)
EGFRCR SERPLBLD CKD-EPI 2021: 41.5 ML/MIN/1.73

## 2023-08-15 PROCEDURE — 74177 CT ABD & PELVIS W/CONTRAST: CPT

## 2023-08-15 PROCEDURE — 82565 ASSAY OF CREATININE: CPT

## 2023-08-15 PROCEDURE — 71260 CT THORAX DX C+: CPT

## 2023-08-15 PROCEDURE — 25510000001 IOPAMIDOL PER 1 ML: Performed by: COLON & RECTAL SURGERY

## 2023-08-15 RX ADMIN — IOPAMIDOL 100 ML: 755 INJECTION, SOLUTION INTRAVENOUS at 15:02

## 2023-08-23 ENCOUNTER — TELEPHONE (OUTPATIENT)
Dept: SURGERY | Facility: CLINIC | Age: 78
End: 2023-08-23
Payer: MEDICARE

## 2023-08-23 NOTE — TELEPHONE ENCOUNTER
DONE 08/15/2023;  CT ROBERTO ABD PELVIS W CONTRAST & CT CHEST W/CONTRAST DIAGNOSTIC.    CHEST: 8 x 6 mm noncalcified nodule in the medial subpleural margin of the right lower lobe (series 5 image 67), is larger than on 1/31/2022, where it measured approximately 6 x 5 mm. The nodule is new since 2/11/2021. No new pulmonary nodules are   identified. Benign calcified nodule is incidentally noted within the right upper lobe.    Pt is asking if Dr. Ocasio can call him regarding CT results & explained to him the actual outcome,  he has read them but does not understand what it means.    Contact [phone].

## 2023-08-24 DIAGNOSIS — I71.40 ABDOMINAL AORTIC ANEURYSM (AAA) WITHOUT RUPTURE, UNSPECIFIED PART: ICD-10-CM

## 2023-08-24 DIAGNOSIS — Z85.048 HISTORY OF ANAL CANCER: Primary | ICD-10-CM

## 2023-08-24 DIAGNOSIS — N81.4 PELVIC FLOOR HERNIA: ICD-10-CM

## 2023-08-24 DIAGNOSIS — C21.2: ICD-10-CM

## 2023-08-24 NOTE — PROGRESS NOTES
Discussed CT findings with patient.  He has an indeterminate lung nodule.  Discussed with him we will need to get PET scan.  And this is ordered.  And follow-up with medical oncology, Dr. Mcfadden.  Discussed he has a 4.6 cm AAA.  Discussed with him he will need to see vascular surgery.  Discussed with patient the parastomal hernia and pelvic floor hernia.  He will need to see Dr. Dugan because we will need some sort of muscle flap for the pelvic floor hernia.  I will need him to see all these physicians before scheduling any sort of elective hernia repair.  All referrals to physicians were placed

## 2023-08-25 ENCOUNTER — TELEPHONE (OUTPATIENT)
Dept: SURGERY | Facility: CLINIC | Age: 78
End: 2023-08-25

## 2023-08-25 NOTE — TELEPHONE ENCOUNTER
Hub staff attempted to follow warm transfer process and was unsuccessful     Caller: DORON STROUD--ON [employer]    Relationship to patient: SPOUSE    Best call back number: [phone]     Patient is needing: PT WIFE CALLED BACK TO GET A BETTER UNDERSTAND OF DR MARCH'S CALL TO HER  LAST NIGHT. PT WIFE SAID HER  IS HARD OF HEARING AND WASN'T 100% SURE OF ALL OF NEXT STEPS.    DR MARCH'S NOTES FROM ORDER ONLY ON 8-24-23    Discussed CT findings with patient.  He has an indeterminate lung nodule.  Discussed with him we will need to get PET scan.  And this is ordered.  And follow-up with medical oncology, Dr. Mcfadden.  Discussed he has a 4.6 cm AAA.  Discussed with him he will need to see vascular surgery.  Discussed with patient the parastomal hernia and pelvic floor hernia.  He will need to see Dr. Dugan because we will need some sort of muscle flap for the pelvic floor hernia.  I will need him to see all these physicians before scheduling any sort of elective hernia repair.  All referrals to physicians were placed

## 2023-08-25 NOTE — TELEPHONE ENCOUNTER
Phoned wife Mayra, and read all the notes that Dr. Ocasio had written up. Dr. Ocasio was behind me & said and pointed to her notes and said that is what I talked to Mr. Pace, so I relayed the message. Mayra is aware & voiced understanding.

## 2023-08-28 ENCOUNTER — TELEPHONE (OUTPATIENT)
Dept: SURGERY | Facility: CLINIC | Age: 78
End: 2023-08-28
Payer: MEDICARE

## 2023-08-28 NOTE — TELEPHONE ENCOUNTER
Pt's daughter Madie called, wanted to speak with you about visit today with . Wants to know what recovery time will be with surgery. Also has questions she would not go in detail about. She also said they have concerns about pt possibly having dementia, wanted to know if they need to have an eval for that prior to surgery.

## 2023-09-01 ENCOUNTER — HOSPITAL ENCOUNTER (OUTPATIENT)
Dept: PET IMAGING | Facility: HOSPITAL | Age: 78
Discharge: HOME | End: 2023-09-01
Payer: MEDICARE

## 2023-09-01 DIAGNOSIS — Z85.048 HISTORY OF ANAL CANCER: ICD-10-CM

## 2023-09-01 DIAGNOSIS — C21.2: ICD-10-CM

## 2023-09-01 LAB — GLUCOSE BLDC GLUCOMTR-MCNC: 91 MG/DL (ref 70–105)

## 2023-09-01 PROCEDURE — 0 FLUDEOXYGLUCOSE F18 SOLUTION: Performed by: COLON & RECTAL SURGERY

## 2023-09-01 PROCEDURE — 82948 REAGENT STRIP/BLOOD GLUCOSE: CPT

## 2023-09-01 PROCEDURE — 78815 PET IMAGE W/CT SKULL-THIGH: CPT

## 2023-09-01 PROCEDURE — A9552 F18 FDG: HCPCS | Performed by: COLON & RECTAL SURGERY

## 2023-09-01 RX ADMIN — FLUDEOXYGLUCOSE F18 1 DOSE: 300 INJECTION INTRAVENOUS at 11:20

## 2023-11-06 ENCOUNTER — TELEPHONE (OUTPATIENT)
Dept: SURGERY | Facility: CLINIC | Age: 78
End: 2023-11-06
Payer: MEDICARE

## 2023-11-06 DIAGNOSIS — N81.4 PELVIC FLOOR HERNIA: Primary | ICD-10-CM

## 2023-11-06 DIAGNOSIS — I71.40 ABDOMINAL AORTIC ANEURYSM (AAA) WITHOUT RUPTURE, UNSPECIFIED PART: ICD-10-CM

## 2023-11-06 NOTE — TELEPHONE ENCOUNTER
Daughter calling states pt was seen a while back and now needs a big surgery and she would like to discuss with you on what the next steps are to get this going would like to have a call back if possible

## 2023-11-14 ENCOUNTER — TELEPHONE (OUTPATIENT)
Dept: ONCOLOGY | Facility: CLINIC | Age: 78
End: 2023-11-14
Payer: MEDICARE

## 2023-11-14 NOTE — TELEPHONE ENCOUNTER
Caller: [facility]I VANCE    Relationship: Emergency Contact    Best call back number: [phone]     What is the best time to reach you: ASAP    Who are you requesting to speak with (clinical staff, provider,  specific staff member): SCHEDULING    What was the call regarding: PT HAD CANCELED HIS SEPTEMBER FOLLOW UP AFTER HIS PET SCAN.  BUT NOW HE NEEDS TO BE SEEN AND CLEARED BY DR BAUTISTA BEFORE THE COLORECTAL GROUP WILL DO ANOTHER NEEDED SURGERY.  PLEASE CALL ZI TO GET THE PT RESCHEDULED FOR LAB AND FOLLOW UP AS NEEDED, AND TO GO OVER PET SCAN RESULTS AND GET CLEARED FOR SURGERY.  HAS BEEN OVER A YEAR SINCE PATIENT WAS SEEN.

## 2023-11-28 ENCOUNTER — APPOINTMENT (OUTPATIENT)
Dept: VASCULAR SURGERY | Facility: HOSPITAL | Age: 78
End: 2023-11-28
Payer: MEDICARE

## 2023-11-28 PROCEDURE — G0463 HOSPITAL OUTPT CLINIC VISIT: HCPCS

## 2023-12-04 NOTE — PROGRESS NOTES
[facility] GROUP OUTPATIENT FOLLOW UP CLINIC VISIT    REASON FOR FOLLOW-UP:    Metastatic anal cancer    HISTORY OF PRESENT ILLNESS:  Darwin Sanchez is a 77 y.o. male who returns today for follow up of the above issue.      He returns today referred back by Dr. Ocasio with colorectal surgery after being absent from the practice for a couple of years.  He has a parastomal hernia and and pelvic floor hernia with plans for operative management.  Other than issues related to this he is doing well.  Recent CT imaging and pet imaging have been obtained.      REVIEW OF SYSTEMS: As per the HPI    Vitals:    12/05/23 1605   BP: 128/80   Pulse: 72   Resp: 18   Temp: 99.1 °F (37.3 °C)   TempSrc: Temporal   SpO2: 96%   Weight: 115 kg (253 lb 8 oz)   PainSc: 0-No pain       PHYSICAL EXAMINATION:  General:  No acute distress, awake, alert and oriented  Skin:  Warm and dry, no visible rash  HEENT:  Normocephalic/atraumatic.  Chest:  Normal respiratory effort.  Lungs clear to auscultation bilaterally.  Heart: Regular rate and rhythm  Abdomen: Ostomy present  Extremities: Trace to 1+ right lower extremity edema present again today status post right inguinal node dissection.  Neuro/psych:  Grossly non-focal.  Normal mood and affect.      DIAGNOSTIC DATA:  CBC & Differential (12/05/2023 15:55)       IMAGING:   NM PET/CT Skull Base to Mid Thigh (09/01/2023 12:50)     PET scan images from 9/1/2023 personally reviewed.  No FDG avid metastatic disease.  There is an 8 mm subpleural nodule in the medial right lower lobe that we will continue to monitor.    ASSESSMENT:  This is a 77 y.o. male with:    *Recurrent anal squamous cell carcinoma  History of anal cancer in 2013 treated with chemoradiation with Xeloda.  He had significant skin toxicity with that treatment.  Chronic non-healing wound after that. Biopsies in 2016 and 2018 benign.   Worsening local changes since late 2020.  CT imaging 2/11/2021 with a sub-6 mm pulmonary nodule  within the right lower lobe with mildly enlarged bilateral common iliac nodes.  Skin defect at the anus.  Surgical biopsy on 2/23/2021 with invasive moderately differentiated keratinizing squamous cell carcinoma.  PET scan on 3/25/2021 with hypermetabolic activity along the stable appearing linear tract of air in the subcutaneous tissues of the right gluteal crease.  1.1 cm right inguinal lymph node increased in size and intensely hypermetabolic.  Stable 3 mm right lower lobe pulmonary nodule.  Right inguinal lymph node biopsy on 4/2/2021 positive for metastatic squamous cell carcinoma.  Laparoscopic abdominal perineal resection on 4/6/2021 by Dr. Ocasio and muscle flap closure by Dr. Bardales with 4.2 cm invasive poorly differentiated focally keratinizing squamous cell carcinoma with ulceration and mixed inflammation.  No lymphovascular invasion.  12 benign lymph nodes.  Sigmoid colon unremarkable with 4 benign lymph nodes.  pT2, PN0.  He has a perineal wound that partially opened after a fall and was taken to the OR by Dr. Bardales for debridement and re-suture on 4/15.    He saw Dr. Zimmerman with surgical oncology.  Right inguinal node dissection on 6/16/2021 by Dr. Zimmerman.  1 of 6 lymph nodes with metastatic squamous cell carcinoma with the largest measuring 1.1 cm.  No extracapsular extension.  He was seen back on 6/29/2021 and offered adjuvant therapy with carboplatin and paclitaxel, which he declined.  He followed up with Dr. Ocasio on 7/19 with a satisfactory examination and no clinical evidence of recurrence.  CT imaging 10/6/21 without obvious recurrence. Small fluid collection in the right inguinal area with small adjacent nodes that warrant observation.  CT and pet imaging 8/15/2023 and 9/1/2023 without obvious recurrence.    *Small right lower lobe pulmonary nodule  CT imaging on 8/15/2023 with an 8 mm subpleural nodule in the medial right lower lobe, unchanged and not FDG avid on PET imaging  9/1/2023.     *Normocytic anemia  Resolved.  Hemoglobin normal at 13.4.    *Mild right lower extremity edema  A result of right inguinal node dissection.  This is stable.      PLAN:  No contraindications from a hematology/oncology standpoint for surgical management of his parastomal hernia and pelvic floor hernia  Repeat CT imaging of the chest abdomen and pelvis in a few months and I will see him back after that for follow-up    I spent 41 minutes in this visit today reviewing his record, communicating with him and his wife, examining him, reviewing imaging, placing orders, documenting the encounter.

## 2023-12-05 ENCOUNTER — OFFICE VISIT (OUTPATIENT)
Dept: ONCOLOGY | Facility: CLINIC | Age: 78
End: 2023-12-05
Payer: MEDICARE

## 2023-12-05 ENCOUNTER — LAB (OUTPATIENT)
Dept: LAB | Facility: HOSPITAL | Age: 78
End: 2023-12-05
Payer: MEDICARE

## 2023-12-05 VITALS
SYSTOLIC BLOOD PRESSURE: 128 MMHG | RESPIRATION RATE: 18 BRPM | BODY MASS INDEX: 34.38 KG/M2 | OXYGEN SATURATION: 96 % | DIASTOLIC BLOOD PRESSURE: 80 MMHG | TEMPERATURE: 99.1 F | HEART RATE: 72 BPM | WEIGHT: 253.5 LBS

## 2023-12-05 DIAGNOSIS — R91.1 LUNG NODULE: ICD-10-CM

## 2023-12-05 DIAGNOSIS — Z85.048 HISTORY OF ANAL CANCER: ICD-10-CM

## 2023-12-05 DIAGNOSIS — D64.9 ANEMIA, UNSPECIFIED TYPE: ICD-10-CM

## 2023-12-05 DIAGNOSIS — D64.9 ANEMIA, UNSPECIFIED TYPE: Primary | ICD-10-CM

## 2023-12-05 LAB
BASOPHILS # BLD AUTO: 0.06 10*3/MM3 (ref 0–0.2)
BASOPHILS NFR BLD AUTO: 0.7 % (ref 0–1.5)
DEPRECATED RDW RBC AUTO: 42.7 FL (ref 37–54)
EOSINOPHIL # BLD AUTO: 0.04 10*3/MM3 (ref 0–0.4)
EOSINOPHIL NFR BLD AUTO: 0.5 % (ref 0.3–6.2)
ERYTHROCYTE [DISTWIDTH] IN BLOOD BY AUTOMATED COUNT: 13 % (ref 12.3–15.4)
HCT VFR BLD AUTO: 40.8 % (ref 37.5–51)
HGB BLD-MCNC: 13.4 G/DL (ref 13–17.7)
IMM GRANULOCYTES # BLD AUTO: 0.02 10*3/MM3 (ref 0–0.05)
IMM GRANULOCYTES NFR BLD AUTO: 0.2 % (ref 0–0.5)
LYMPHOCYTES # BLD AUTO: 1.44 10*3/MM3 (ref 0.7–3.1)
LYMPHOCYTES NFR BLD AUTO: 16.7 % (ref 19.6–45.3)
MCH RBC QN AUTO: 29.8 PG (ref 26.6–33)
MCHC RBC AUTO-ENTMCNC: 32.8 G/DL (ref 31.5–35.7)
MCV RBC AUTO: 90.7 FL (ref 79–97)
MONOCYTES # BLD AUTO: 0.58 10*3/MM3 (ref 0.1–0.9)
MONOCYTES NFR BLD AUTO: 6.7 % (ref 5–12)
NEUTROPHILS NFR BLD AUTO: 6.46 10*3/MM3 (ref 1.7–7)
NEUTROPHILS NFR BLD AUTO: 75.2 % (ref 42.7–76)
NRBC BLD AUTO-RTO: 0 /100 WBC (ref 0–0.2)
PLATELET # BLD AUTO: 179 10*3/MM3 (ref 140–450)
PMV BLD AUTO: 10.7 FL (ref 6–12)
RBC # BLD AUTO: 4.5 10*6/MM3 (ref 4.14–5.8)
WBC NRBC COR # BLD AUTO: 8.6 10*3/MM3 (ref 3.4–10.8)

## 2023-12-05 PROCEDURE — 85025 COMPLETE CBC W/AUTO DIFF WBC: CPT

## 2023-12-05 PROCEDURE — 36415 COLL VENOUS BLD VENIPUNCTURE: CPT

## 2023-12-05 RX ORDER — POLYETHYLENE GLYCOL 3350, SODIUM CHLORIDE, SODIUM BICARBONATE, POTASSIUM CHLORIDE 420; 11.2; 5.72; 1.48 G/4L; G/4L; G/4L; G/4L
POWDER, FOR SOLUTION ORAL
COMMUNITY
End: 2023-12-05

## 2023-12-05 RX ORDER — FLUCONAZOLE 150 MG/1
TABLET ORAL
COMMUNITY
End: 2023-12-05

## 2023-12-05 RX ORDER — APREMILAST 30 MG/1
TABLET, FILM COATED ORAL
COMMUNITY
End: 2023-12-05

## 2023-12-05 RX ORDER — AMLODIPINE BESYLATE 10 MG/1
TABLET ORAL
COMMUNITY

## 2023-12-05 RX ORDER — PREDNISONE 20 MG/1
TABLET ORAL
COMMUNITY

## 2023-12-05 RX ORDER — METHYLPREDNISOLONE 4 MG/1
TABLET ORAL
COMMUNITY
Start: 2023-11-27

## 2023-12-05 RX ORDER — DESONIDE 0.5 MG/G
CREAM TOPICAL
COMMUNITY
End: 2023-12-05

## 2023-12-05 RX ORDER — CHLORTHALIDONE 25 MG/1
25 TABLET ORAL DAILY
COMMUNITY

## 2023-12-05 RX ORDER — VALSARTAN AND HYDROCHLOROTHIAZIDE 160; 12.5 MG/1; MG/1
TABLET, FILM COATED ORAL
COMMUNITY
End: 2023-12-05

## 2023-12-05 RX ORDER — OXYCODONE HCL 20 MG/1
TABLET, FILM COATED, EXTENDED RELEASE ORAL
COMMUNITY
End: 2023-12-05

## 2023-12-05 RX ORDER — TAMSULOSIN HYDROCHLORIDE 0.4 MG/1
CAPSULE ORAL
COMMUNITY
End: 2023-12-05

## 2023-12-05 RX ORDER — VALSARTAN AND HYDROCHLOROTHIAZIDE 160; 25 MG/1; MG/1
TABLET ORAL
COMMUNITY
End: 2023-12-05

## 2023-12-05 RX ORDER — METHOTREXATE 2.5 MG/1
15 TABLET ORAL WEEKLY
COMMUNITY
Start: 2023-11-28

## 2023-12-05 RX ORDER — FOLIC ACID 1 MG/1
1 TABLET ORAL DAILY
COMMUNITY
Start: 2023-11-27

## 2023-12-05 RX ORDER — FLUCONAZOLE 200 MG/1
TABLET ORAL
COMMUNITY
End: 2023-12-05

## 2023-12-05 RX ORDER — TIZANIDINE 2 MG/1
TABLET ORAL
COMMUNITY
Start: 2023-11-30

## 2024-01-29 ENCOUNTER — OFFICE VISIT (OUTPATIENT)
Dept: SURGERY | Facility: CLINIC | Age: 79
End: 2024-01-29
Payer: MEDICARE

## 2024-01-29 VITALS
HEIGHT: 72 IN | OXYGEN SATURATION: 99 % | BODY MASS INDEX: 34.54 KG/M2 | SYSTOLIC BLOOD PRESSURE: 140 MMHG | HEART RATE: 71 BPM | DIASTOLIC BLOOD PRESSURE: 84 MMHG | WEIGHT: 255 LBS

## 2024-01-29 DIAGNOSIS — K43.5 PARASTOMAL HERNIA WITHOUT OBSTRUCTION OR GANGRENE: Primary | ICD-10-CM

## 2024-01-29 PROCEDURE — 3079F DIAST BP 80-89 MM HG: CPT | Performed by: COLON & RECTAL SURGERY

## 2024-01-29 PROCEDURE — 3077F SYST BP >= 140 MM HG: CPT | Performed by: COLON & RECTAL SURGERY

## 2024-01-29 PROCEDURE — 1159F MED LIST DOCD IN RCRD: CPT | Performed by: COLON & RECTAL SURGERY

## 2024-01-29 PROCEDURE — 1160F RVW MEDS BY RX/DR IN RCRD: CPT | Performed by: COLON & RECTAL SURGERY

## 2024-01-29 PROCEDURE — 99213 OFFICE O/P EST LOW 20 MIN: CPT | Performed by: COLON & RECTAL SURGERY

## 2024-01-29 RX ORDER — METRONIDAZOLE 500 MG/100ML
500 INJECTION, SOLUTION INTRAVENOUS ONCE
OUTPATIENT
Start: 2024-01-29 | End: 2024-01-29

## 2024-01-29 RX ORDER — GABAPENTIN 100 MG/1
600 CAPSULE ORAL 3 TIMES DAILY
OUTPATIENT
Start: 2024-01-29

## 2024-01-29 RX ORDER — CELECOXIB 100 MG/1
200 CAPSULE ORAL ONCE
OUTPATIENT
Start: 2024-01-29 | End: 2024-01-29

## 2024-01-29 RX ORDER — CHLORHEXIDINE GLUCONATE 4 G/100ML
SOLUTION TOPICAL 2 TIMES DAILY
Qty: 118 ML | Refills: 0 | Status: SHIPPED | OUTPATIENT
Start: 2024-01-29

## 2024-01-29 RX ORDER — ALVIMOPAN 12 MG/1
12 CAPSULE ORAL ONCE
OUTPATIENT
Start: 2024-01-29 | End: 2024-01-29

## 2024-01-29 RX ORDER — ACETAMINOPHEN 500 MG
1000 TABLET ORAL ONCE
OUTPATIENT
Start: 2024-01-29 | End: 2024-01-29

## 2024-01-29 NOTE — PROGRESS NOTES
"Darwin Sanchez is a 78 y.o. male in for follow up of Parastomal hernia without obstruction or gangrene    The patient was last seen in 08/2023 and was recommended to consult with medical oncology. He did and was cleared. Dr. Bardales. He has seen the patient about a muscle flap for the pelvic floor, and I do not see any vascular surgery note for the 4.6 cm AAA. He is accompanied by an adult female.     The patient was seen in [place] and was told he had an aneurysm that he should monitor. He has a family history of it in his brother and sister. He believes that he would benefit by losing weight and improving his blood pressure. His blood pressure elevates in the afternoons, and his primary care provider is not sure how to control this moving forward. to He is aware of the aneurysm, but he will not have surgery. He walks 4 days weekly for 45 minutes.     He has noticeable swelling in his abdomen when standing but the area is soft when he lies down. He inquires if weight loss would improve his hernias. The adult female inquiries about surgical risks and chance of recurrence. The patient denies any pain associated with the upper hernia. The adult female notes that it has increased in size. He denies any constipation.       /84 (BP Location: Right arm, Patient Position: Sitting, Cuff Size: Large Adult)   Pulse 71   Ht 182.9 cm (72\")   Wt 116 kg (255 lb)   SpO2 99%   BMI 34.58 kg/m²   Body mass index is 34.58 kg/m².      PE:  Physical Exam  Constitutional:       General: He is not in acute distress.     Appearance: He is well-developed.   HENT:      Head: Normocephalic and atraumatic.   Abdominal:      General: There is no distension.      Palpations: Abdomen is soft.   Musculoskeletal:         General: Normal range of motion.   Neurological:      Mental Status: He is alert.   Psychiatric:         Thought Content: Thought content normal.           Assessment:   1. Parastomal hernia without obstruction or " gangrene           Plan:  The patient has a reducible parastomal hernia shown on a CT scan that is fairly large. We will plan to do a parastomal hernia with mesh laparoscopically with the Da Kip robot. I described with patient typical surgical time, postop recovery including pain management, and restrictions. Risks of recurrence was discussed. I discussed with patient risks, benefits, and alternatives. The patient had an opportunity to ask questions. I answered all questions. Patient understands and wishes to proceed with the procedure.     Transcribed from ambient dictation for Makeda Ocasio MD by Ashley Gallardo.  01/29/24   11:42 EST    Patient or patient representative verbalized consent to the visit recording.  I have personally performed the services described in this document as transcribed by the above individual, and it is both accurate and complete.

## 2024-02-29 DIAGNOSIS — Z43.3 COLOSTOMY CARE: Primary | ICD-10-CM

## 2024-03-01 ENCOUNTER — HOSPITAL ENCOUNTER (OUTPATIENT)
Dept: INFUSION THERAPY | Facility: HOSPITAL | Age: 79
Discharge: HOME | End: 2024-03-01
Payer: MEDICARE

## 2024-03-01 PROCEDURE — G0463 HOSPITAL OUTPT CLINIC VISIT: HCPCS

## 2024-03-01 NOTE — NURSING NOTE
CWON note: pt seen in the OP ostomy clinic today per Dr Ocasio's office order. Pt reports a wound around the peristomal skin which is worsening. He is currently wearing a deep convex [employer] BRYAN appliance with paste and barrier extenders. He is scheduled for parastomal hernia repair with Dr Ocasio 3/19.     Stoma is 20mm, budded in slightly firm round abdomen, parastomal hernia noted. Peristomal skin is noted to have denudation with partial thickness skin loss around the immediate stoma area and then a full thickness wound noted at 11 o'clock. The wound is 2.5x 2.1x 0.2cms with pink wound base, purple along the superior edge and slightly painful with cleansing. We felt that the deep convex is putting to much pressure around the stoma causing trauma and pressure to the skin. The denuded peristomal skin was crusted with stoma powder and barrier spray and the wound was treated with hydrofera blue classic which was hydrated with sterile NS prior to placing over the wound. Pt was placed in the soft convex BRYAN 1-piece pouch with stoma paste (per his request/ preference) and barrier extenders along the edges. He did have some yeast dermatitis unde the barrier extender; this was treated with antifungal powder and barrier spray prior to placing the barrier extenders. He was given a few additional soft convex appliances and a flat 1-piece BRYAN pouch. Samples are being ordered from [employer] and sent to pt's home with his verbal consent. He will try both the soft convex and flat appliance to see which works best. Obviously the flat appliance will provide the greatest pressure relief, but he is hesitant to try it. Pt will return to clinic for follow up and wound assessment next Friday, March 8 at 0930.

## 2024-03-08 ENCOUNTER — HOSPITAL ENCOUNTER (OUTPATIENT)
Dept: INFUSION THERAPY | Facility: HOSPITAL | Age: 79
Discharge: HOME | End: 2024-03-08
Payer: MEDICARE

## 2024-03-08 PROCEDURE — G0463 HOSPITAL OUTPT CLINIC VISIT: HCPCS

## 2024-03-08 NOTE — NURSING NOTE
CWOCN- follow up on outpatient clinic from 3/1/24. Referral from [facility].     Patient reports that they soft convex appliance we put on him last week leaked after 2 hrs. He says he replaced it with another one we gave him and that leaked as well. He put his 1 piece deep convex on after that and had no trouble. He feels like the peristomal skin is worse.     Removed the appliance and assessed skin. Skin is similar to the week prior. There is some progression with the wound but nothing unexpected given the bruising appearance around the wound edge prior. Peristomal skin is moist with areas of epithelial tissue noted.     Last week we needed to use antifungal powder related to the dermatitis. This powder can decrease the adherence of the ostomy appliances.     Will use Lansing today. 1 application of Lansing provided a crust over the moist peristomal skin so powder is not needed. Continued hydrofera blue moistened  for the wound- also secured with a piece of thin hydrocolloid.   Recommend to try a light convex. This will be more firm than a soft convex but less deep. Patient gets a small dip around the stoma so also recommend a barrier ring- patient agreed to try the barrier ring vs stomahesive paste. Patient also agrees to a belt- recommend to at least wear for a few hrs to help adherence. Placed the above and provided instruction to patient and his son.     Additional supplies provided for patient. We will order samples as well of the 1 piece light convex, and barrier rings.    Son took a photo and is sending to Dr Ocasio's office. They are concerned that patient's OR will be canceled. I advised I will reach out as well.     Patient to call us next week to see how things are going/determine follow up needs.

## 2024-03-12 ENCOUNTER — PRE-ADMISSION TESTING (OUTPATIENT)
Dept: PREADMISSION TESTING | Facility: HOSPITAL | Age: 79
End: 2024-03-12
Payer: MEDICARE

## 2024-03-12 VITALS
WEIGHT: 257.6 LBS | BODY MASS INDEX: 34.89 KG/M2 | TEMPERATURE: 98.5 F | RESPIRATION RATE: 18 BRPM | SYSTOLIC BLOOD PRESSURE: 135 MMHG | HEART RATE: 71 BPM | HEIGHT: 72 IN | DIASTOLIC BLOOD PRESSURE: 83 MMHG | OXYGEN SATURATION: 97 %

## 2024-03-12 DIAGNOSIS — K43.5 PARASTOMAL HERNIA WITHOUT OBSTRUCTION OR GANGRENE: ICD-10-CM

## 2024-03-12 LAB
ABO GROUP BLD: NORMAL
ANION GAP SERPL CALCULATED.3IONS-SCNC: 7 MMOL/L (ref 5–15)
BLD GP AB SCN SERPL QL: NEGATIVE
BUN SERPL-MCNC: 32 MG/DL (ref 8–23)
BUN/CREAT SERPL: 25.2 (ref 7–25)
CALCIUM SPEC-SCNC: 9.2 MG/DL (ref 8.6–10.5)
CHLORIDE SERPL-SCNC: 107 MMOL/L (ref 98–107)
CO2 SERPL-SCNC: 28 MMOL/L (ref 22–29)
CREAT SERPL-MCNC: 1.27 MG/DL (ref 0.76–1.27)
DEPRECATED RDW RBC AUTO: 42.3 FL (ref 37–54)
EGFRCR SERPLBLD CKD-EPI 2021: 58.6 ML/MIN/1.73
ERYTHROCYTE [DISTWIDTH] IN BLOOD BY AUTOMATED COUNT: 12.8 % (ref 12.3–15.4)
GLUCOSE SERPL-MCNC: 146 MG/DL (ref 65–99)
HCT VFR BLD AUTO: 37.7 % (ref 37.5–51)
HGB BLD-MCNC: 12.3 G/DL (ref 13–17.7)
MCH RBC QN AUTO: 29.5 PG (ref 26.6–33)
MCHC RBC AUTO-ENTMCNC: 32.6 G/DL (ref 31.5–35.7)
MCV RBC AUTO: 90.4 FL (ref 79–97)
PLATELET # BLD AUTO: 177 10*3/MM3 (ref 140–450)
PMV BLD AUTO: 10.7 FL (ref 6–12)
POTASSIUM SERPL-SCNC: 3.2 MMOL/L (ref 3.5–5.2)
QT INTERVAL: 452 MS
QTC INTERVAL: 445 MS
RBC # BLD AUTO: 4.17 10*6/MM3 (ref 4.14–5.8)
RH BLD: POSITIVE
SODIUM SERPL-SCNC: 142 MMOL/L (ref 136–145)
T&S EXPIRATION DATE: NORMAL
WBC NRBC COR # BLD AUTO: 6.28 10*3/MM3 (ref 3.4–10.8)

## 2024-03-12 PROCEDURE — 80048 BASIC METABOLIC PNL TOTAL CA: CPT

## 2024-03-12 PROCEDURE — 85027 COMPLETE CBC AUTOMATED: CPT

## 2024-03-12 PROCEDURE — 86901 BLOOD TYPING SEROLOGIC RH(D): CPT

## 2024-03-12 PROCEDURE — 36415 COLL VENOUS BLD VENIPUNCTURE: CPT

## 2024-03-12 PROCEDURE — 86900 BLOOD TYPING SEROLOGIC ABO: CPT

## 2024-03-12 PROCEDURE — 93005 ELECTROCARDIOGRAM TRACING: CPT

## 2024-03-12 PROCEDURE — 86850 RBC ANTIBODY SCREEN: CPT

## 2024-03-12 RX ORDER — HYDROCHLOROTHIAZIDE 12.5 MG/1
12.5 TABLET ORAL DAILY
COMMUNITY

## 2024-03-12 NOTE — DISCHARGE INSTRUCTIONS
Take the following medications the morning of surgery:  CLONIDINE      ARRIVE FOR SURGERY AT 5:30 AM      If you are on prescription narcotic pain medication to control your pain you may also take that medication the morning of surgery.    General Instructions:    Follow your surgeons instructions regarding when to stop solid foods and when to stop liquids.   Verify with your surgeon if you are to complete a bowel prep and when to do so.  Patients who avoid smoking, chewing tobacco and alcohol for 4 weeks prior to surgery have a reduced risk of post-operative complications.  Quit smoking as many days before surgery as you can.  Do not smoke, use chewing tobacco or drink alcohol the day of surgery.   If applicable bring your C-PAP/ BI-PAP machine in with you to preop day of surgery.  Bring any papers given to you in the doctor’s office.  Wear clean comfortable clothes.  Do not wear contact lenses, false eyelashes or make-up.  Bring a case for your glasses.   Bring crutches or walker if applicable.  Remove all piercings.  Leave jewelry and any other valuables at home.  Hair extensions with metal clips must be removed prior to surgery.  The Pre-Admission Testing nurse will instruct you to bring medications if unable to obtain an accurate list in Pre-Admission Testing.        If you were given a blood bank ID arm band remember to bring it with you the day of surgery.    Preventing a Surgical Site Infection:  For 2 to 3 days before surgery, avoid shaving with a razor because the razor can irritate skin and make it easier to develop an infection.    Any areas of open skin can increase the risk of a post-operative wound infection by allowing bacteria to enter and travel throughout the body.  Notify your surgeon if you have any skin wounds / rashes even if it is not near the expected surgical site.  The area will need assessed to determine if surgery should be delayed until it is healed.  The night prior to surgery shower  using a fresh bar of anti-bacterial soap (such as Dial) and clean washcloth.  Sleep in a clean bed with clean clothing.  Do not allow pets to sleep with you.  Shower on the morning of surgery using a fresh bar of anti-bacterial soap (such as Dial) and clean washcloth.  Dry with a clean towel and dress in clean clothing.  Ask your surgeon if you will be receiving antibiotics prior to surgery.  Make sure you, your family, and all healthcare providers clean their hands with soap and water or an alcohol based hand  before caring for you or your wound.    CHLORHEXIDINE CLOTH INSTRUCTIONS  The morning of surgery follow these instructions using the Chlorhexidine cloths you've been given.  These steps reduce bacteria on the body.  Do not use the cloths near your eyes, ears mouth, genitalia or on open wounds.  Throw the cloths away after use but do not try to flush them down a toilet.      Open and remove one cloth at a time from the package.    Leave the cloth unfolded and begin the bathing.  Massage the skin with the cloths using gentle pressure to remove bacteria.  Do not scrub harshly.   Follow the steps below with one 2% CHG cloth per area (6 total cloths).  One cloth for neck, shoulders and chest.  One cloth for both arms, hands, fingers and underarms (do underarms last).  One cloth for the abdomen followed by groin.  One cloth for right leg and foot including between the toes.  One cloth for left leg and foot including between the toes.  The last cloth is to be used for the back of the neck, back and buttocks.    Allow the CHG to air dry 3 minutes on the skin which will give it time to work and decrease the chance of irritation.  The skin may feel sticky until it is dry.  Do not rinse with water or any other liquid or you will lose the beneficial effects of the CHG.  If mild skin irritation occurs, do rinse the skin to remove the CHG.  Report this to the nurse at time of admission.  Do not apply lotions,  creams, ointments, deodorants or perfumes after using the clothes. Dress in clean clothes before coming to the hospital.      Day of surgery:  Your arrival time is approximately two hours before your scheduled surgery time.  Upon arrival, a Pre-op nurse and Anesthesiologist will review your health history, obtain vital signs, and answer questions you may have.  The only belongings needed at this time will be a list of your home medications and if applicable your C-PAP/BI-PAP machine.  A Pre-op nurse will start an IV and you may receive medication in preparation for surgery, including something to help you relax.     Please be aware that surgery does come with discomfort.  We want to make every effort to control your discomfort so please discuss any uncontrolled symptoms with your nurse.   Your doctor will most likely have prescribed pain medications.      If you are going home after surgery you will receive individualized written care instructions before being discharged.  A responsible adult must drive you to and from the hospital on the day of your surgery and stay with you for 24 hours.  Discharge prescriptions can be filled by the hospital pharmacy during regular pharmacy hours.  If you are having surgery late in the day/evening your prescription may be e-prescribed to your pharmacy.  Please verify your pharmacy hours or chose a 24 hour pharmacy to avoid not having access to your prescription because your pharmacy has closed for the day.    If you are staying overnight following surgery, you will be transported to your hospital room following the recovery period.  [facility] has all private rooms.    If you have any questions please call Pre-Admission Testing at [phone].  Deductibles and co-payments are collected on the day of service. Please be prepared to pay the required co-pay, deductible or deposit on the day of service as defined by your plan.    Call your surgeon immediately if you  experience any of the following symptoms:  Sore Throat  Shortness of Breath or difficulty breathing  Cough  Chills  Body soreness or muscle pain  Headache  Fever  New loss of taste or smell  Do not arrive for your surgery ill.  Your procedure will need to be rescheduled to another time.  You will need to call your physician before the day of surgery to avoid any unnecessary exposure to hospital staff as well as other patients.

## 2024-03-15 ENCOUNTER — HOSPITAL ENCOUNTER (OUTPATIENT)
Dept: INFUSION THERAPY | Facility: HOSPITAL | Age: 79
Discharge: HOME | End: 2024-03-15
Payer: MEDICARE

## 2024-03-15 PROCEDURE — G0463 HOSPITAL OUTPT CLINIC VISIT: HCPCS

## 2024-03-15 NOTE — NURSING NOTE
CWOCN- follow up for peristomal skin breakdown and wound. Patient reports that the light convex did not work. He says it leaked x2 so he resumed the deep convex.   Removed the appliance- the peristomal moisture and skin loss is better than last week however it is not fully healed. It is less moist. The wound has defined edges and slightly improved depth. Tissue is beefy red. There is some improvement from last week.   We still recommend to try to stay away from the deep convex as this continues to increase pressure on the skin. I am not completely sure why the light convex with barrier ring is not working. Today we reapplied Marathon to the peristomal skin. Continued the hydrofera blue + hydrocolloid. Placed a light convex with stomahesive paste. Patient uses paste vs the ring and feels like it helps with adherence. Recommend to keep the belt on.   Will continue to monitor. Patient will be here next week for a hernia repair. We will see him Wed.   Patient and son are aware of current plan.

## 2024-03-19 ENCOUNTER — ANESTHESIA EVENT (OUTPATIENT)
Dept: PERIOP | Facility: HOSPITAL | Age: 79
End: 2024-03-19
Payer: MEDICARE

## 2024-03-19 ENCOUNTER — HOSPITAL ENCOUNTER (INPATIENT)
Facility: HOSPITAL | Age: 79
LOS: 3 days | Discharge: HOME HEALTH | End: 2024-03-22
Attending: COLON & RECTAL SURGERY | Admitting: COLON & RECTAL SURGERY
Payer: MEDICARE

## 2024-03-19 ENCOUNTER — ANESTHESIA (OUTPATIENT)
Dept: PERIOP | Facility: HOSPITAL | Age: 79
End: 2024-03-19
Payer: MEDICARE

## 2024-03-19 DIAGNOSIS — K43.5 PARASTOMAL HERNIA WITHOUT OBSTRUCTION OR GANGRENE: Primary | ICD-10-CM

## 2024-03-19 PROCEDURE — 0 BUPIVACAINE LIPOSOME 1.3 % SUSPENSION: Performed by: ANESTHESIOLOGY

## 2024-03-19 PROCEDURE — 25010000002 ONDANSETRON PER 1 MG: Performed by: NURSE ANESTHETIST, CERTIFIED REGISTERED

## 2024-03-19 PROCEDURE — 44346 REVISION OF COLOSTOMY: CPT | Performed by: COLON & RECTAL SURGERY

## 2024-03-19 PROCEDURE — 25010000002 CEFAZOLIN PER 500 MG: Performed by: NURSE ANESTHETIST, CERTIFIED REGISTERED

## 2024-03-19 PROCEDURE — 25810000003 LACTATED RINGERS PER 1000 ML: Performed by: ANESTHESIOLOGY

## 2024-03-19 PROCEDURE — 8E0W4CZ ROBOTIC ASSISTED PROCEDURE OF TRUNK REGION, PERCUTANEOUS ENDOSCOPIC APPROACH: ICD-10-PCS | Performed by: COLON & RECTAL SURGERY

## 2024-03-19 PROCEDURE — 25010000002 LIDOCAINE PER 10 MG: Performed by: NURSE ANESTHETIST, CERTIFIED REGISTERED

## 2024-03-19 PROCEDURE — 25010000002 SUGAMMADEX 200 MG/2ML SOLUTION: Performed by: NURSE ANESTHETIST, CERTIFIED REGISTERED

## 2024-03-19 PROCEDURE — 25010000002 DEXAMETHASONE SODIUM PHOSPHATE 20 MG/5ML SOLUTION: Performed by: NURSE ANESTHETIST, CERTIFIED REGISTERED

## 2024-03-19 PROCEDURE — 25010000002 BUPIVACAINE (PF) 0.25 % SOLUTION: Performed by: ANESTHESIOLOGY

## 2024-03-19 PROCEDURE — 25810000003 LACTATED RINGERS PER 1000 ML: Performed by: COLON & RECTAL SURGERY

## 2024-03-19 PROCEDURE — 88304 TISSUE EXAM BY PATHOLOGIST: CPT | Performed by: COLON & RECTAL SURGERY

## 2024-03-19 PROCEDURE — 44346 REVISION OF COLOSTOMY: CPT | Performed by: PHYSICIAN ASSISTANT

## 2024-03-19 PROCEDURE — C9290 INJ, BUPIVACAINE LIPOSOME: HCPCS | Performed by: ANESTHESIOLOGY

## 2024-03-19 PROCEDURE — 25010000002 INDOCYANINE GREEN 25 MG RECONSTITUTED SOLUTION: Performed by: NURSE ANESTHETIST, CERTIFIED REGISTERED

## 2024-03-19 PROCEDURE — 25810000003 SODIUM CHLORIDE PER 500 ML: Performed by: COLON & RECTAL SURGERY

## 2024-03-19 PROCEDURE — 25810000003 SODIUM CHLORIDE 0.9 % SOLUTION 250 ML FLEX CONT: Performed by: NURSE ANESTHETIST, CERTIFIED REGISTERED

## 2024-03-19 PROCEDURE — 25010000002 PROPOFOL 200 MG/20ML EMULSION: Performed by: NURSE ANESTHETIST, CERTIFIED REGISTERED

## 2024-03-19 PROCEDURE — 25010000002 HYDROMORPHONE PER 4 MG: Performed by: NURSE ANESTHETIST, CERTIFIED REGISTERED

## 2024-03-19 PROCEDURE — 0WQF4ZZ REPAIR ABDOMINAL WALL, PERCUTANEOUS ENDOSCOPIC APPROACH: ICD-10-PCS | Performed by: COLON & RECTAL SURGERY

## 2024-03-19 PROCEDURE — 25010000002 CEFAZOLIN PER 500 MG: Performed by: COLON & RECTAL SURGERY

## 2024-03-19 PROCEDURE — 25010000002 PHENYLEPHRINE 10 MG/ML SOLUTION 5 ML VIAL: Performed by: NURSE ANESTHETIST, CERTIFIED REGISTERED

## 2024-03-19 PROCEDURE — 25010000002 METRONIDAZOLE 500 MG/100ML SOLUTION: Performed by: COLON & RECTAL SURGERY

## 2024-03-19 PROCEDURE — 25010000002 MAGNESIUM SULFATE PER 500 MG OF MAGNESIUM: Performed by: NURSE ANESTHETIST, CERTIFIED REGISTERED

## 2024-03-19 DEVICE — DEV WND/CLS STRATAFIX SYMM PDS PLS ABS 23CM 9IN VIL: Type: IMPLANTABLE DEVICE | Site: ABDOMEN | Status: FUNCTIONAL

## 2024-03-19 DEVICE — PROXIMATE LINEAR CUTTER RELOAD, BLUE, 75MM
Type: IMPLANTABLE DEVICE | Site: ABDOMEN | Status: FUNCTIONAL
Brand: PROXIMATE

## 2024-03-19 DEVICE — PROXIMATE RELOADABLE LINEAR CUTTER WITH SAFETY LOCK-OUT, 75MM
Type: IMPLANTABLE DEVICE | Site: ABDOMEN | Status: FUNCTIONAL
Brand: PROXIMATE

## 2024-03-19 RX ORDER — SODIUM CHLORIDE, SODIUM LACTATE, POTASSIUM CHLORIDE, CALCIUM CHLORIDE 600; 310; 30; 20 MG/100ML; MG/100ML; MG/100ML; MG/100ML
9 INJECTION, SOLUTION INTRAVENOUS CONTINUOUS
Status: DISCONTINUED | OUTPATIENT
Start: 2024-03-19 | End: 2024-03-19

## 2024-03-19 RX ORDER — SODIUM CHLORIDE 0.9 % (FLUSH) 0.9 %
10 SYRINGE (ML) INJECTION AS NEEDED
Status: DISCONTINUED | OUTPATIENT
Start: 2024-03-19 | End: 2024-03-19 | Stop reason: HOSPADM

## 2024-03-19 RX ORDER — DEXAMETHASONE SODIUM PHOSPHATE 4 MG/ML
INJECTION, SOLUTION INTRA-ARTICULAR; INTRALESIONAL; INTRAMUSCULAR; INTRAVENOUS; SOFT TISSUE AS NEEDED
Status: DISCONTINUED | OUTPATIENT
Start: 2024-03-19 | End: 2024-03-19 | Stop reason: SURG

## 2024-03-19 RX ORDER — ACETAMINOPHEN 500 MG
1000 TABLET ORAL EVERY 6 HOURS
Status: DISCONTINUED | OUTPATIENT
Start: 2024-03-19 | End: 2024-03-22 | Stop reason: HOSPADM

## 2024-03-19 RX ORDER — ONDANSETRON 2 MG/ML
4 INJECTION INTRAMUSCULAR; INTRAVENOUS ONCE AS NEEDED
Status: COMPLETED | OUTPATIENT
Start: 2024-03-19 | End: 2024-03-19

## 2024-03-19 RX ORDER — GABAPENTIN 300 MG/1
600 CAPSULE ORAL 3 TIMES DAILY
Status: DISCONTINUED | OUTPATIENT
Start: 2024-03-19 | End: 2024-03-19 | Stop reason: HOSPADM

## 2024-03-19 RX ORDER — SODIUM CHLORIDE, SODIUM LACTATE, POTASSIUM CHLORIDE, CALCIUM CHLORIDE 600; 310; 30; 20 MG/100ML; MG/100ML; MG/100ML; MG/100ML
75 INJECTION, SOLUTION INTRAVENOUS CONTINUOUS
Status: DISCONTINUED | OUTPATIENT
Start: 2024-03-19 | End: 2024-03-21

## 2024-03-19 RX ORDER — SODIUM CHLORIDE 0.9 % (FLUSH) 0.9 %
3 SYRINGE (ML) INJECTION EVERY 12 HOURS SCHEDULED
Status: DISCONTINUED | OUTPATIENT
Start: 2024-03-19 | End: 2024-03-19 | Stop reason: HOSPADM

## 2024-03-19 RX ORDER — LABETALOL HYDROCHLORIDE 5 MG/ML
5 INJECTION, SOLUTION INTRAVENOUS
Status: DISCONTINUED | OUTPATIENT
Start: 2024-03-19 | End: 2024-03-19

## 2024-03-19 RX ORDER — ONDANSETRON 2 MG/ML
4 INJECTION INTRAMUSCULAR; INTRAVENOUS EVERY 6 HOURS PRN
Status: DISCONTINUED | OUTPATIENT
Start: 2024-03-19 | End: 2024-03-22 | Stop reason: HOSPADM

## 2024-03-19 RX ORDER — NALOXONE HCL 0.4 MG/ML
0.4 VIAL (ML) INJECTION AS NEEDED
Status: DISCONTINUED | OUTPATIENT
Start: 2024-03-19 | End: 2024-03-19

## 2024-03-19 RX ORDER — CELECOXIB 200 MG/1
200 CAPSULE ORAL ONCE
Status: COMPLETED | OUTPATIENT
Start: 2024-03-19 | End: 2024-03-19

## 2024-03-19 RX ORDER — DROPERIDOL 2.5 MG/ML
0.62 INJECTION, SOLUTION INTRAMUSCULAR; INTRAVENOUS
Status: DISCONTINUED | OUTPATIENT
Start: 2024-03-19 | End: 2024-03-19

## 2024-03-19 RX ORDER — KETAMINE HCL IN NACL, ISO-OSM 100MG/10ML
SYRINGE (ML) INJECTION AS NEEDED
Status: DISCONTINUED | OUTPATIENT
Start: 2024-03-19 | End: 2024-03-19 | Stop reason: SURG

## 2024-03-19 RX ORDER — HYDROMORPHONE HYDROCHLORIDE 1 MG/ML
0.25 INJECTION, SOLUTION INTRAMUSCULAR; INTRAVENOUS; SUBCUTANEOUS
Status: DISCONTINUED | OUTPATIENT
Start: 2024-03-19 | End: 2024-03-19

## 2024-03-19 RX ORDER — FLUMAZENIL 0.1 MG/ML
0.2 INJECTION INTRAVENOUS AS NEEDED
Status: DISCONTINUED | OUTPATIENT
Start: 2024-03-19 | End: 2024-03-19

## 2024-03-19 RX ORDER — ALVIMOPAN 12 MG/1
12 CAPSULE ORAL ONCE
Status: COMPLETED | OUTPATIENT
Start: 2024-03-19 | End: 2024-03-19

## 2024-03-19 RX ORDER — ONDANSETRON 2 MG/ML
INJECTION INTRAMUSCULAR; INTRAVENOUS AS NEEDED
Status: DISCONTINUED | OUTPATIENT
Start: 2024-03-19 | End: 2024-03-19 | Stop reason: SURG

## 2024-03-19 RX ORDER — LIDOCAINE HYDROCHLORIDE ANHYDROUS AND DEXTROSE MONOHYDRATE 5; 400 G/100ML; MG/100ML
INJECTION, SOLUTION INTRAVENOUS CONTINUOUS PRN
Status: DISCONTINUED | OUTPATIENT
Start: 2024-03-19 | End: 2024-03-19 | Stop reason: SURG

## 2024-03-19 RX ORDER — CELECOXIB 100 MG/1
100 CAPSULE ORAL EVERY 12 HOURS SCHEDULED
Status: DISCONTINUED | OUTPATIENT
Start: 2024-03-19 | End: 2024-03-20

## 2024-03-19 RX ORDER — ACETAMINOPHEN 500 MG
1000 TABLET ORAL ONCE
Status: COMPLETED | OUTPATIENT
Start: 2024-03-19 | End: 2024-03-19

## 2024-03-19 RX ORDER — SODIUM CHLORIDE, SODIUM LACTATE, POTASSIUM CHLORIDE, CALCIUM CHLORIDE 600; 310; 30; 20 MG/100ML; MG/100ML; MG/100ML; MG/100ML
1000 INJECTION, SOLUTION INTRAVENOUS CONTINUOUS
Status: DISCONTINUED | OUTPATIENT
Start: 2024-03-19 | End: 2024-03-19

## 2024-03-19 RX ORDER — GABAPENTIN 100 MG/1
200 CAPSULE ORAL 2 TIMES DAILY
Status: COMPLETED | OUTPATIENT
Start: 2024-03-19 | End: 2024-03-22

## 2024-03-19 RX ORDER — BUPIVACAINE HYDROCHLORIDE 2.5 MG/ML
INJECTION, SOLUTION EPIDURAL; INFILTRATION; INTRACAUDAL
Status: COMPLETED
Start: 2024-03-19 | End: 2024-03-19

## 2024-03-19 RX ORDER — HYDROMORPHONE HYDROCHLORIDE 1 MG/ML
0.25 INJECTION, SOLUTION INTRAMUSCULAR; INTRAVENOUS; SUBCUTANEOUS
Status: DISCONTINUED | OUTPATIENT
Start: 2024-03-19 | End: 2024-03-21

## 2024-03-19 RX ORDER — INDOCYANINE GREEN AND WATER 25 MG
KIT INJECTION AS NEEDED
Status: DISCONTINUED | OUTPATIENT
Start: 2024-03-19 | End: 2024-03-19 | Stop reason: SURG

## 2024-03-19 RX ORDER — SODIUM CHLORIDE 9 MG/ML
INJECTION, SOLUTION INTRAVENOUS AS NEEDED
Status: DISCONTINUED | OUTPATIENT
Start: 2024-03-19 | End: 2024-03-19 | Stop reason: HOSPADM

## 2024-03-19 RX ORDER — SODIUM CHLORIDE 0.9 % (FLUSH) 0.9 %
3-10 SYRINGE (ML) INJECTION AS NEEDED
Status: DISCONTINUED | OUTPATIENT
Start: 2024-03-19 | End: 2024-03-19 | Stop reason: HOSPADM

## 2024-03-19 RX ORDER — METOCLOPRAMIDE HYDROCHLORIDE 5 MG/ML
10 INJECTION INTRAMUSCULAR; INTRAVENOUS ONCE AS NEEDED
Status: DISCONTINUED | OUTPATIENT
Start: 2024-03-19 | End: 2024-03-19

## 2024-03-19 RX ORDER — DIPHENHYDRAMINE HCL 25 MG
25 CAPSULE ORAL EVERY 4 HOURS PRN
Status: DISCONTINUED | OUTPATIENT
Start: 2024-03-19 | End: 2024-03-19

## 2024-03-19 RX ORDER — MAGNESIUM SULFATE HEPTAHYDRATE 500 MG/ML
INJECTION, SOLUTION INTRAMUSCULAR; INTRAVENOUS AS NEEDED
Status: DISCONTINUED | OUTPATIENT
Start: 2024-03-19 | End: 2024-03-19 | Stop reason: SURG

## 2024-03-19 RX ORDER — ALVIMOPAN 12 MG/1
12 CAPSULE ORAL 2 TIMES DAILY
Status: DISCONTINUED | OUTPATIENT
Start: 2024-03-20 | End: 2024-03-21

## 2024-03-19 RX ORDER — FAMOTIDINE 10 MG/ML
20 INJECTION, SOLUTION INTRAVENOUS ONCE
Status: COMPLETED | OUTPATIENT
Start: 2024-03-19 | End: 2024-03-19

## 2024-03-19 RX ORDER — NITROGLYCERIN 0.4 MG/1
0.4 TABLET SUBLINGUAL
Status: DISCONTINUED | OUTPATIENT
Start: 2024-03-19 | End: 2024-03-22 | Stop reason: HOSPADM

## 2024-03-19 RX ORDER — ROCURONIUM BROMIDE 10 MG/ML
INJECTION, SOLUTION INTRAVENOUS AS NEEDED
Status: DISCONTINUED | OUTPATIENT
Start: 2024-03-19 | End: 2024-03-19 | Stop reason: SURG

## 2024-03-19 RX ORDER — CEFAZOLIN SODIUM 500 MG/2.2ML
INJECTION, POWDER, FOR SOLUTION INTRAMUSCULAR; INTRAVENOUS AS NEEDED
Status: DISCONTINUED | OUTPATIENT
Start: 2024-03-19 | End: 2024-03-19 | Stop reason: SURG

## 2024-03-19 RX ORDER — IPRATROPIUM BROMIDE AND ALBUTEROL SULFATE 2.5; .5 MG/3ML; MG/3ML
3 SOLUTION RESPIRATORY (INHALATION) ONCE AS NEEDED
Status: DISCONTINUED | OUTPATIENT
Start: 2024-03-19 | End: 2024-03-19

## 2024-03-19 RX ORDER — METRONIDAZOLE 500 MG/100ML
500 INJECTION, SOLUTION INTRAVENOUS ONCE
Status: COMPLETED | OUTPATIENT
Start: 2024-03-19 | End: 2024-03-19

## 2024-03-19 RX ORDER — ENOXAPARIN SODIUM 100 MG/ML
40 INJECTION SUBCUTANEOUS DAILY
Status: DISCONTINUED | OUTPATIENT
Start: 2024-03-20 | End: 2024-03-22 | Stop reason: HOSPADM

## 2024-03-19 RX ORDER — LIDOCAINE HYDROCHLORIDE 20 MG/ML
INJECTION, SOLUTION INFILTRATION; PERINEURAL AS NEEDED
Status: DISCONTINUED | OUTPATIENT
Start: 2024-03-19 | End: 2024-03-19 | Stop reason: SURG

## 2024-03-19 RX ORDER — DIPHENHYDRAMINE HYDROCHLORIDE 50 MG/ML
25 INJECTION INTRAMUSCULAR; INTRAVENOUS EVERY 4 HOURS PRN
Status: DISCONTINUED | OUTPATIENT
Start: 2024-03-19 | End: 2024-03-19

## 2024-03-19 RX ORDER — NALOXONE HCL 0.4 MG/ML
0.4 VIAL (ML) INJECTION
Status: DISCONTINUED | OUTPATIENT
Start: 2024-03-19 | End: 2024-03-22 | Stop reason: HOSPADM

## 2024-03-19 RX ORDER — BUPIVACAINE HYDROCHLORIDE AND EPINEPHRINE 2.5; 5 MG/ML; UG/ML
INJECTION, SOLUTION EPIDURAL; INFILTRATION; INTRACAUDAL; PERINEURAL AS NEEDED
Status: DISCONTINUED | OUTPATIENT
Start: 2024-03-19 | End: 2024-03-19 | Stop reason: HOSPADM

## 2024-03-19 RX ORDER — FENTANYL CITRATE 50 UG/ML
50 INJECTION, SOLUTION INTRAMUSCULAR; INTRAVENOUS ONCE AS NEEDED
Status: DISCONTINUED | OUTPATIENT
Start: 2024-03-19 | End: 2024-03-19 | Stop reason: HOSPADM

## 2024-03-19 RX ORDER — OXYCODONE HYDROCHLORIDE 5 MG/1
5 TABLET ORAL ONCE AS NEEDED
Status: COMPLETED | OUTPATIENT
Start: 2024-03-19 | End: 2024-03-19

## 2024-03-19 RX ORDER — BUPIVACAINE HYDROCHLORIDE 2.5 MG/ML
INJECTION, SOLUTION EPIDURAL; INFILTRATION; INTRACAUDAL
Status: COMPLETED | OUTPATIENT
Start: 2024-03-19 | End: 2024-03-19

## 2024-03-19 RX ORDER — KETAMINE HCL IN NACL, ISO-OSM 100MG/10ML
10 SYRINGE (ML) INJECTION
Status: DISCONTINUED | OUTPATIENT
Start: 2024-03-19 | End: 2024-03-19

## 2024-03-19 RX ORDER — CLONIDINE HYDROCHLORIDE 0.1 MG/1
0.1 TABLET ORAL 2 TIMES DAILY
Status: DISCONTINUED | OUTPATIENT
Start: 2024-03-19 | End: 2024-03-21

## 2024-03-19 RX ORDER — LIDOCAINE HYDROCHLORIDE 10 MG/ML
0.5 INJECTION, SOLUTION INFILTRATION; PERINEURAL ONCE AS NEEDED
Status: DISCONTINUED | OUTPATIENT
Start: 2024-03-19 | End: 2024-03-19 | Stop reason: HOSPADM

## 2024-03-19 RX ORDER — LOSARTAN POTASSIUM 50 MG/1
50 TABLET ORAL DAILY
Status: DISCONTINUED | OUTPATIENT
Start: 2024-03-19 | End: 2024-03-21

## 2024-03-19 RX ORDER — PROPOFOL 10 MG/ML
INJECTION, EMULSION INTRAVENOUS AS NEEDED
Status: DISCONTINUED | OUTPATIENT
Start: 2024-03-19 | End: 2024-03-19 | Stop reason: SURG

## 2024-03-19 RX ORDER — HYDRALAZINE HYDROCHLORIDE 20 MG/ML
5 INJECTION INTRAMUSCULAR; INTRAVENOUS
Status: DISCONTINUED | OUTPATIENT
Start: 2024-03-19 | End: 2024-03-19

## 2024-03-19 RX ORDER — MIDAZOLAM HYDROCHLORIDE 1 MG/ML
0.5 INJECTION INTRAMUSCULAR; INTRAVENOUS
Status: DISCONTINUED | OUTPATIENT
Start: 2024-03-19 | End: 2024-03-19 | Stop reason: HOSPADM

## 2024-03-19 RX ORDER — NALOXONE HCL 0.4 MG/ML
0.4 VIAL (ML) INJECTION
Status: DISCONTINUED | OUTPATIENT
Start: 2024-03-19 | End: 2024-03-19

## 2024-03-19 RX ADMIN — ROCURONIUM BROMIDE 50 MG: 10 INJECTION, SOLUTION INTRAVENOUS at 07:41

## 2024-03-19 RX ADMIN — DEXAMETHASONE SODIUM PHOSPHATE 10 MG: 4 INJECTION, SOLUTION INTRAMUSCULAR; INTRAVENOUS at 07:46

## 2024-03-19 RX ADMIN — OXYCODONE HYDROCHLORIDE 5 MG: 5 TABLET ORAL at 11:18

## 2024-03-19 RX ADMIN — ONDANSETRON 4 MG: 2 INJECTION INTRAMUSCULAR; INTRAVENOUS at 11:21

## 2024-03-19 RX ADMIN — SODIUM CHLORIDE 2000 MG: 900 INJECTION INTRAVENOUS at 07:26

## 2024-03-19 RX ADMIN — SODIUM CHLORIDE, POTASSIUM CHLORIDE, SODIUM LACTATE AND CALCIUM CHLORIDE: 600; 310; 30; 20 INJECTION, SOLUTION INTRAVENOUS at 10:17

## 2024-03-19 RX ADMIN — CELECOXIB 200 MG: 200 CAPSULE ORAL at 07:00

## 2024-03-19 RX ADMIN — SODIUM CHLORIDE, POTASSIUM CHLORIDE, SODIUM LACTATE AND CALCIUM CHLORIDE 50 ML/HR: 600; 310; 30; 20 INJECTION, SOLUTION INTRAVENOUS at 16:21

## 2024-03-19 RX ADMIN — BUPIVACAINE HYDROCHLORIDE 10 ML: 2.5 INJECTION, SOLUTION EPIDURAL; INFILTRATION; INTRACAUDAL; PERINEURAL at 07:50

## 2024-03-19 RX ADMIN — ACETAMINOPHEN 1000 MG: 500 TABLET ORAL at 18:19

## 2024-03-19 RX ADMIN — Medication 10 MG: at 08:41

## 2024-03-19 RX ADMIN — LOSARTAN POTASSIUM 50 MG: 50 TABLET, FILM COATED ORAL at 18:18

## 2024-03-19 RX ADMIN — GABAPENTIN 200 MG: 100 CAPSULE ORAL at 20:20

## 2024-03-19 RX ADMIN — SODIUM CHLORIDE, POTASSIUM CHLORIDE, SODIUM LACTATE AND CALCIUM CHLORIDE 1000 ML: 600; 310; 30; 20 INJECTION, SOLUTION INTRAVENOUS at 07:21

## 2024-03-19 RX ADMIN — Medication 10 MG: at 09:39

## 2024-03-19 RX ADMIN — CEFAZOLIN 2 G: 225 INJECTION, POWDER, FOR SOLUTION INTRAMUSCULAR; INTRAVENOUS at 08:02

## 2024-03-19 RX ADMIN — METRONIDAZOLE 500 MG: 500 INJECTION, SOLUTION INTRAVENOUS at 07:25

## 2024-03-19 RX ADMIN — CELECOXIB 100 MG: 100 CAPSULE ORAL at 20:20

## 2024-03-19 RX ADMIN — ROCURONIUM BROMIDE 20 MG: 10 INJECTION, SOLUTION INTRAVENOUS at 08:54

## 2024-03-19 RX ADMIN — Medication 3 ML: at 07:23

## 2024-03-19 RX ADMIN — FAMOTIDINE 20 MG: 10 INJECTION INTRAVENOUS at 07:21

## 2024-03-19 RX ADMIN — CLONIDINE HYDROCHLORIDE 0.1 MG: 0.1 TABLET ORAL at 20:20

## 2024-03-19 RX ADMIN — SUGAMMADEX 200 MG: 100 INJECTION, SOLUTION INTRAVENOUS at 10:26

## 2024-03-19 RX ADMIN — SODIUM CHLORIDE, POTASSIUM CHLORIDE, SODIUM LACTATE AND CALCIUM CHLORIDE 9 ML/HR: 600; 310; 30; 20 INJECTION, SOLUTION INTRAVENOUS at 07:00

## 2024-03-19 RX ADMIN — ACETAMINOPHEN 1000 MG: 500 TABLET ORAL at 07:00

## 2024-03-19 RX ADMIN — PROPOFOL 200 MG: 10 INJECTION, EMULSION INTRAVENOUS at 07:39

## 2024-03-19 RX ADMIN — HYDROMORPHONE HYDROCHLORIDE 0.25 MG: 1 INJECTION, SOLUTION INTRAMUSCULAR; INTRAVENOUS; SUBCUTANEOUS at 11:28

## 2024-03-19 RX ADMIN — LIDOCAINE HYDROCHLORIDE 100 MG: 20 INJECTION, SOLUTION INFILTRATION; PERINEURAL at 07:39

## 2024-03-19 RX ADMIN — ALVIMOPAN 12 MG: 12 CAPSULE ORAL at 07:00

## 2024-03-19 RX ADMIN — Medication 20 MG: at 07:40

## 2024-03-19 RX ADMIN — ROCURONIUM BROMIDE 30 MG: 10 INJECTION, SOLUTION INTRAVENOUS at 08:14

## 2024-03-19 RX ADMIN — Medication 10 MG: at 10:18

## 2024-03-19 RX ADMIN — ACETAMINOPHEN 1000 MG: 500 TABLET ORAL at 11:18

## 2024-03-19 RX ADMIN — PHENYLEPHRINE HYDROCHLORIDE 0.5 MCG/KG/MIN: 10 INJECTION, SOLUTION INTRAVENOUS at 07:52

## 2024-03-19 RX ADMIN — BUPIVACAINE 20 ML: 13.3 INJECTION, SUSPENSION, LIPOSOMAL INFILTRATION at 07:49

## 2024-03-19 RX ADMIN — GABAPENTIN 600 MG: 300 CAPSULE ORAL at 07:00

## 2024-03-19 RX ADMIN — INDOCYANINE GREEN AND WATER 7.5 MG: KIT at 08:58

## 2024-03-19 RX ADMIN — MAGNESIUM SULFATE HEPTAHYDRATE 2 G: 500 INJECTION, SOLUTION INTRAMUSCULAR; INTRAVENOUS at 07:59

## 2024-03-19 RX ADMIN — ONDANSETRON 4 MG: 2 INJECTION INTRAMUSCULAR; INTRAVENOUS at 10:20

## 2024-03-19 RX ADMIN — LIDOCAINE HYDROCHLORIDE 2 MG/MIN: 4 INJECTION, SOLUTION INTRAVENOUS at 07:52

## 2024-03-19 NOTE — ANESTHESIA POSTPROCEDURE EVALUATION
Patient: Darwin Sanchez    Procedure Summary       Date: 03/19/24 Room / Location: [facility] OR [address] [place] [address] MAIN OR    Anesthesia Start: 0732 Anesthesia Stop: 1040    Procedure: OPEN REVISION OF COLOSTOMY, REPAIR OF PARASTOMAL HERNIA.  LAPAROSCOPIC LYSIS OF ADHESIONS WITH DAVINCI ROBOT (Abdomen) Diagnosis:       Parastomal hernia without obstruction or gangrene      (Parastomal hernia without obstruction or gangrene [K43.5])    Surgeons: Makeda Ocasio MD Provider: Radha Dempsey MD    Anesthesia Type: general with block ASA Status: 3            Anesthesia Type: general with block    Vitals  Vitals Value Taken Time   /88 03/19/24 1430   Temp 36.7 °C (98 °F) 03/19/24 1036   Pulse 65 03/19/24 1439   Resp 16 03/19/24 1430   SpO2 99 % 03/19/24 1439   Vitals shown include unfiled device data.        Anesthesia Post Evaluation

## 2024-03-19 NOTE — ANESTHESIA PROCEDURE NOTES
Peripheral Block      Patient reassessed immediately prior to procedure    Patient location during procedure: holding area  Start time: 3/19/2024 7:49 AM  Reason for block: at surgeon's request and post-op pain management  Performed by  Anesthesiologist: Radha Dempsey MD  Preanesthetic Checklist  Completed: patient identified, IV checked, site marked, risks and benefits discussed, surgical consent, monitors and equipment checked, pre-op evaluation and timeout performed  Prep:  Sterile barriers:gloves  Prep: ChloraPrep  Patient monitoring: continuous pulse oximetry, blood pressure monitoring and EKG  Procedure    Sedation: yes  Performed under: general  Guidance:ultrasound guided  Images:still images obtained, printed/placed on chart    Laterality:Bilateral  Block Type:TAP    Needle Type:echogenic      Medications Used: bupivacaine liposome (EXPAREL) 1.3 % injection - Infiltration   20 mL - 3/19/2024 7:49:00 AM  bupivacaine PF (MARCAINE) 0.25 % injection - Injection   10 mL - 3/19/2024 7:50:00 AM      Medications  Comment:Ultrasound guidance was used to view and verify medication disbursement.  Ultrasound guidance was used for needle placement.    Post Assessment  Injection Assessment: negative aspiration for heme, no paresthesia on injection and incremental injection  Patient Tolerance:comfortable throughout block  Complications:no  Additional Notes  Ultrasound guidance was used to guide needle placement, as well as to view and verify medication disbursement.

## 2024-03-19 NOTE — PLAN OF CARE
Goal Outcome Evaluation:              Outcome Evaluation: VSS. Pt admitted to 4e after a colostomy revision by Dr. Ocasio. 4 Lap sites CDI. Stage 2 Wound on bottom present on admission. Q2 turn. WOCN consulted. IVF infusing. No other complaints. Family at bedside. Needs met at this time.

## 2024-03-19 NOTE — ANESTHESIA PROCEDURE NOTES
Airway  Urgency: elective    Date/Time: 3/19/2024 7:44 AM  Difficult airway    General Information and Staff    Patient location during procedure: OR  CRNA/CAA: Junior Peck CRNA    Indications and Patient Condition  Indications for airway management: airway protection    Preoxygenated: yes  MILS maintained throughout  Mask difficulty assessment: 2 - vent by mask + OA or adjuvant +/- NMBA    Final Airway Details  Final airway type: endotracheal airway      Successful airway: ETT  Cuffed: yes   Successful intubation technique: direct laryngoscopy  Facilitating devices/methods: intubating stylet  Endotracheal tube insertion site: oral  Blade: CMAC  Blade size: D  ETT size (mm): 7.5  Cormack-Lehane Classification: grade I - full view of glottis  Placement verified by: chest auscultation and capnometry   Measured from: gums  ETT/EBT to gums (cm): 24  Number of attempts at approach: 1  Assessment: lips, teeth, and gum same as pre-op and atraumatic intubation

## 2024-03-19 NOTE — H&P
The patient was last seen in 08/2023 and was recommended to consult with medical oncology. He did and was cleared. Dr. Bardales. He has seen the patient about a muscle flap for the pelvic floor, and I do not see any vascular surgery note for the 4.6 cm AAA. He is accompanied by an adult female.      The patient was seen in [place] and was told he had an aneurysm that he should monitor. He has a family history of it in his brother and sister. He believes that he would benefit by losing weight and improving his blood pressure. His blood pressure elevates in the afternoons, and his primary care provider is not sure how to control this moving forward. to He is aware of the aneurysm, but he will not have surgery. He walks 4 days weekly for 45 minutes.      He has noticeable swelling in his abdomen when standing but the area is soft when he lies down. He inquires if weight loss would improve his hernias. The adult female inquiries about surgical risks and chance of recurrence. The patient denies any pain associated with the upper hernia. The adult female notes that it has increased in size. He denies any constipation.     On the superior part of his stoma he has developed a small ulceration over the past 6 weeks.  With treatment from the wound ostomy nurses gotten better.     Past Medical History:   Diagnosis Date    Abdominal hernia     Anal cancer 2013    S/P CHEMO, XRT    Anxiety     Arthritis     CAD (coronary artery disease)     Carotid artery occlusion 07/06/2019    Chest pain 02/2017    CKD (chronic kidney disease) 04/15/2021    Condyloma     Dyspnea 04/2017    ED (erectile dysfunction)     Fish hook injury of finger 09/04/2017    RIGHT INDEX FINGER, SEEN AT [facility]    Gout     Gouty arthropathy 07/06/2019    Hearing loss 11/25/2020    BILATERAL HEARING AIDS    History of 2019 novel coronavirus disease (COVID-19) 03/2020    PER PCP-ANTIBODY TEST    History of chemotherapy 2021    FOLLOWED BY DR. JAYSON BAUTISTA     History of radiation therapy 2021    FOLLOWED BY DR. RIGO PIZARRO    Hyperlipidemia 07/06/2019    Hypersomnia     Hypertension     Impaired fasting glucose 07/06/2019    Inguinal hernia     Lymph node enlargement 03/2021    Lymphadenopathy, inguinal 09/2021    RIGHT INGUINAL    Metastasis 2021    TO RIGHT INGUINAL LYMPH NODE, 1/6 NODES POSITIVE    Normocytic anemia 05/2021    OA (osteoarthritis)     MULTIPLE SITES    ELISHA (obstructive sleep apnea) 07/26/2021    Home sleep study.  Moderate ELISHA with AHI 23 events per hour.  No sleep-related hypoxia noted.    Osteoporosis     Psoriasis     Radiation dermatitis     Rectal bleeding     WOUND, TO HAVE BIOPSIED PER DR OCASIO    Rheumatoid arthritis     SCC (squamous cell carcinoma) 04/2020    ANAL, S/P EXCISION AROUND 2013, HAD RADIATION AND CHEMO    Skin breakdown     AROUND STOMA, BLEEDING, RAW AREA    Syncope and collapse 04/15/2021    ADMITTED TO [facility]    Thoracic aortic aneurysm without rupture 03/20/2021    Uses hearing aid     BILATERAL    Wound dehiscence 04/15/2021     Past Surgical History:   Procedure Laterality Date    ABDOMINAL PERINEAL RESECTION N/A 04/06/2021    Procedure: ABDOMINAL PERINEAL RESECTION LAPAROSCOPIC WITH DAVINCI ROBOT;  Surgeon: Makeda Ocasio MD;  Location: [facility] OR;  Service: DaVinci;  Laterality: N/A;    BREAST RECONSTRUCTION WITH LATISSIMUS MUSCLE FLAP AND IMPLANT INSERTION Bilateral 04/06/2021    Procedure: BILATERAL GLUTEAL ADVANCEMENT FLAP AND GRACILIS MUSCLE FLAP;  Surgeon: Mark Bardales MD;  Location: [facility] OR;  Service: Plastics;  Laterality: Bilateral;    COLONOSCOPY N/A 2018    COLOSTOMY      CONDYLOMA REMOVAL N/A 02/23/2021    Procedure: PERINEAL/ANAL LESION EXCISION/FULGURATION;  Surgeon: Makeda Ocasio MD;  Location: [facility] OR;  Service: General;  Laterality: N/A;    KNEE ARTHROSCOPY Bilateral     OVER 35 YRS AGO, DR. CACERES    LYMPH NODE DISSECTION Right 06/16/2021    RIGHT INGUINAL, 1/6 (+)  "NODES, DR. DK MARTINES AT [facility]    MUSCLE FLAP N/A 04/15/2021    Procedure: PERINEUM WOUND DEBRIDEMENT AND COMPLEX CLOSURE;  Surgeon: Mark Bardales MD;  Location: [facility];  Service: Plastics;  Laterality: N/A;    RECTAL EXAMINATION UNDER ANESTHESIA N/A 03/09/2018    WITH BIOPSIES OF ANUS, DR. LINA ISIDRO AT [facility]    SKIN SURGERY  2013    GRAFTS    SOFT TISSUE BIOPSY Right 04/02/2021    RIGHT INGUINAL, (+) METASTASIS IN 1/6 NODES    SQUAMOUS CELL CARCINOMA EXCISION N/A 09/23/2016    DIETER RECTAL AREA, DR. LINA ISIDRO AT [facility]    TOTAL KNEE ARTHROPLASTY Right 10/17/2018    Procedure: TOTAL KNEE ARTHROPLASTY;  Surgeon: Cooper Ruby MD;  Location: [facility];  Service: Orthopedics       /98 (BP Location: Left arm, Patient Position: Lying)   Pulse 66   Temp 98.5 °F (36.9 °C) (Oral)   Resp 17   Ht 182.9 cm (72\")   Wt 117 kg (257 lb 15 oz)   SpO2 97%   BMI 34.98 kg/m²        PE:  Physical Exam  Constitutional:       General: He is not in acute distress.     Appearance: He is well-developed.   HENT:      Head: Normocephalic and atraumatic.   Abdominal:      General: There is no distension.      Palpations: Abdomen is soft.   Left-sided ostomy with reducible parastomal hernia  Musculoskeletal:         General: Normal range of motion.   Neurological:      Mental Status: He is alert.   Psychiatric:         Thought Content: Thought content normal.               Assessment:   1. Parastomal hernia without obstruction or gangrene             Plan:  The patient has a reducible parastomal hernia shown on a CT scan that is fairly large. We will plan to do a parastomal hernia with mesh laparoscopically with the Da Kip robot. I described with patient typical surgical time, postop recovery including pain management, and restrictions. Risks of recurrence was discussed. I discussed with patient risks, benefits, and alternatives. The patient had an opportunity to ask questions. I " answered all questions. Patient understands and wishes to proceed with the procedure.

## 2024-03-19 NOTE — ANESTHESIA PREPROCEDURE EVALUATION
Anesthesia Evaluation                  Airway   Mallampati: IV  Neck ROM: limited  Possible difficult intubation  Dental      Comment: cap      Pulmonary    (+) ,shortness of breath, sleep apnea  Cardiovascular     (+) hypertension 2 medications or greater, CAD, hyperlipidemia      Neuro/Psych  (+) syncope, psychiatric history  GI/Hepatic/Renal/Endo    (+) GI bleeding , renal disease- CRI    Musculoskeletal     Abdominal    Substance History      OB/GYN          Other   arthritis,   history of cancer remission      Other Comment: Rheumatoid arthritis              Anesthesia Plan    ASA 3     general with block     intravenous induction     Anesthetic plan, risks, benefits, and alternatives have been provided, discussed and informed consent has been obtained with: patient.    CODE STATUS:

## 2024-03-19 NOTE — NURSING NOTE
03/19/24 0649   Stoma Site Marking   Procedure Marking For colostomy   Site Marked RUQ: right upper quadrant   Patient Assessment Screen rectus muscle identified;marked within patient's visual field;bony prominences avoided;waistband avoided;creases/scars avoided   How Was Patient Marked? surgical marker   Patient Position During Marking sitting;standing  (bending)     CWON note: pt seen in pre-op for stoma marking to RUQ in case pt requires a stoma relocation. Site was marked across from existing ostomy, visible to pt. He has a round soft abdomen. Site had to be marked high for visibility. Will follow post-op for assessment/ pouching revisions.

## 2024-03-20 LAB
ANION GAP SERPL CALCULATED.3IONS-SCNC: 10 MMOL/L (ref 5–15)
APTT PPP: 26.3 SECONDS (ref 22.7–35.4)
BASOPHILS # BLD AUTO: 0.02 10*3/MM3 (ref 0–0.2)
BASOPHILS NFR BLD AUTO: 0.1 % (ref 0–1.5)
BUN SERPL-MCNC: 24 MG/DL (ref 8–23)
BUN/CREAT SERPL: 15.8 (ref 7–25)
CALCIUM SPEC-SCNC: 7.9 MG/DL (ref 8.6–10.5)
CHLORIDE SERPL-SCNC: 107 MMOL/L (ref 98–107)
CO2 SERPL-SCNC: 25 MMOL/L (ref 22–29)
CREAT SERPL-MCNC: 1.52 MG/DL (ref 0.76–1.27)
DEPRECATED RDW RBC AUTO: 40.7 FL (ref 37–54)
EGFRCR SERPLBLD CKD-EPI 2021: 47.2 ML/MIN/1.73
EOSINOPHIL # BLD AUTO: 0 10*3/MM3 (ref 0–0.4)
EOSINOPHIL NFR BLD AUTO: 0 % (ref 0.3–6.2)
ERYTHROCYTE [DISTWIDTH] IN BLOOD BY AUTOMATED COUNT: 12.3 % (ref 12.3–15.4)
GLUCOSE SERPL-MCNC: 120 MG/DL (ref 65–99)
HCT VFR BLD AUTO: 32.9 % (ref 37.5–51)
HGB BLD-MCNC: 10.8 G/DL (ref 13–17.7)
IMM GRANULOCYTES # BLD AUTO: 0.05 10*3/MM3 (ref 0–0.05)
IMM GRANULOCYTES NFR BLD AUTO: 0.4 % (ref 0–0.5)
INR PPP: 1.06 (ref 0.9–1.1)
LYMPHOCYTES # BLD AUTO: 1.18 10*3/MM3 (ref 0.7–3.1)
LYMPHOCYTES NFR BLD AUTO: 8.7 % (ref 19.6–45.3)
MAGNESIUM SERPL-MCNC: 2.2 MG/DL (ref 1.6–2.4)
MCH RBC QN AUTO: 29.3 PG (ref 26.6–33)
MCHC RBC AUTO-ENTMCNC: 32.8 G/DL (ref 31.5–35.7)
MCV RBC AUTO: 89.4 FL (ref 79–97)
MONOCYTES # BLD AUTO: 0.97 10*3/MM3 (ref 0.1–0.9)
MONOCYTES NFR BLD AUTO: 7.2 % (ref 5–12)
NEUTROPHILS NFR BLD AUTO: 11.32 10*3/MM3 (ref 1.7–7)
NEUTROPHILS NFR BLD AUTO: 83.6 % (ref 42.7–76)
NRBC BLD AUTO-RTO: 0 /100 WBC (ref 0–0.2)
PLATELET # BLD AUTO: 177 10*3/MM3 (ref 140–450)
PMV BLD AUTO: 10.9 FL (ref 6–12)
POTASSIUM SERPL-SCNC: 3.5 MMOL/L (ref 3.5–5.2)
PROTHROMBIN TIME: 14.1 SECONDS (ref 11.7–14.2)
RBC # BLD AUTO: 3.68 10*6/MM3 (ref 4.14–5.8)
SODIUM SERPL-SCNC: 142 MMOL/L (ref 136–145)
WBC NRBC COR # BLD AUTO: 13.54 10*3/MM3 (ref 3.4–10.8)

## 2024-03-20 PROCEDURE — 99024 POSTOP FOLLOW-UP VISIT: CPT | Performed by: PHYSICIAN ASSISTANT

## 2024-03-20 PROCEDURE — 25010000002 ENOXAPARIN PER 10 MG: Performed by: COLON & RECTAL SURGERY

## 2024-03-20 PROCEDURE — 85025 COMPLETE CBC W/AUTO DIFF WBC: CPT | Performed by: COLON & RECTAL SURGERY

## 2024-03-20 PROCEDURE — 25810000003 LACTATED RINGERS PER 1000 ML: Performed by: PHYSICIAN ASSISTANT

## 2024-03-20 PROCEDURE — 85610 PROTHROMBIN TIME: CPT | Performed by: COLON & RECTAL SURGERY

## 2024-03-20 PROCEDURE — 85730 THROMBOPLASTIN TIME PARTIAL: CPT | Performed by: COLON & RECTAL SURGERY

## 2024-03-20 PROCEDURE — 83735 ASSAY OF MAGNESIUM: CPT | Performed by: COLON & RECTAL SURGERY

## 2024-03-20 PROCEDURE — 80048 BASIC METABOLIC PNL TOTAL CA: CPT | Performed by: COLON & RECTAL SURGERY

## 2024-03-20 RX ORDER — ZOLPIDEM TARTRATE 5 MG/1
5 TABLET ORAL NIGHTLY PRN
Status: DISCONTINUED | OUTPATIENT
Start: 2024-03-20 | End: 2024-03-22 | Stop reason: HOSPADM

## 2024-03-20 RX ADMIN — ENOXAPARIN SODIUM 40 MG: 100 INJECTION SUBCUTANEOUS at 08:19

## 2024-03-20 RX ADMIN — ALVIMOPAN 12 MG: 12 CAPSULE ORAL at 08:19

## 2024-03-20 RX ADMIN — GABAPENTIN 200 MG: 100 CAPSULE ORAL at 20:06

## 2024-03-20 RX ADMIN — CLONIDINE HYDROCHLORIDE 0.1 MG: 0.1 TABLET ORAL at 08:19

## 2024-03-20 RX ADMIN — SODIUM CHLORIDE, POTASSIUM CHLORIDE, SODIUM LACTATE AND CALCIUM CHLORIDE 75 ML/HR: 600; 310; 30; 20 INJECTION, SOLUTION INTRAVENOUS at 22:41

## 2024-03-20 RX ADMIN — GABAPENTIN 200 MG: 100 CAPSULE ORAL at 08:19

## 2024-03-20 RX ADMIN — SODIUM CHLORIDE, POTASSIUM CHLORIDE, SODIUM LACTATE AND CALCIUM CHLORIDE 75 ML/HR: 600; 310; 30; 20 INJECTION, SOLUTION INTRAVENOUS at 08:22

## 2024-03-20 RX ADMIN — ACETAMINOPHEN 1000 MG: 500 TABLET ORAL at 17:40

## 2024-03-20 RX ADMIN — ACETAMINOPHEN 1000 MG: 500 TABLET ORAL at 06:32

## 2024-03-20 RX ADMIN — LOSARTAN POTASSIUM 50 MG: 50 TABLET, FILM COATED ORAL at 08:19

## 2024-03-20 RX ADMIN — CLONIDINE HYDROCHLORIDE 0.1 MG: 0.1 TABLET ORAL at 20:06

## 2024-03-20 RX ADMIN — ACETAMINOPHEN 1000 MG: 500 TABLET ORAL at 00:55

## 2024-03-20 RX ADMIN — ACETAMINOPHEN 1000 MG: 500 TABLET ORAL at 11:58

## 2024-03-20 RX ADMIN — ZOLPIDEM TARTRATE 5 MG: 5 TABLET ORAL at 22:14

## 2024-03-20 RX ADMIN — ALVIMOPAN 12 MG: 12 CAPSULE ORAL at 20:07

## 2024-03-20 NOTE — NURSING NOTE
"   03/20/24 0837   Wound 03/19/24 1630 perineum   Placement Date/Time: 03/19/24 1630   Location: perineum   Base clean;granulating;moist;pink  (nonhealing perineal wound (old APR incision))   Periwound intact;dry   Periwound Temperature warm   Wound Length (cm) 1.5 cm   Wound Width (cm) 1 cm   Wound Depth (cm) 0.2 cm   Wound Surface Area (cm^2) 1.5 cm^2   Wound Volume (cm^3) 0.3 cm^3   Drainage Characteristics/Odor serosanguineous   Drainage Amount scant   Care, Wound cleansed with;sterile normal saline   Dressing Care dressing changed;silver impregnated;hydrofiber;silicone;foam   Periwound Care dry periwound area maintained   Colostomy LLQ   Placement Date/Time: 04/06/21 1444   Inserted by: Dr. Ocasio  Hand Hygiene Completed: Yes  Location: LLQ   Stomal Appliance 1 piece;Changed   Stoma Appearance round;rosebud appearance;moist;red;protruding above skin level  (1 3/8\" dark red)   Peristomal Assessment Clean  (incision noted at 11 o'clock, oozing scant amount of serosanginous drainage.)   Accessories/Skin Care cleansed with water;skin barrier ring;wafer barrier over peristomal skin  (small piece of Opticel ag used over the incision, they 1-piece flat BRYAN pouch placed with barrier ring.)   Stoma Function serosanguineous   Treatment Bag change;Site care     CWON note: pt seen post-op day 1 after hernia repair and stoma revision. He is alert and oriented, normally independent with his ostomy care. He has been followed in the OP clinic due to issues with appliance leakage and peristomal skin issues. The prior peristomal ulcer is now the location of the incision at 11 o'clock, this was protected with Opticel ag prior to placing appliance. I discussed with pt that we will start with a flat appliance/ wafer and move to light convex if needed. We would try to put the least amount of pressure on this healing incision to avoid trauma. He was agreeable to try. Extra ostomy supplies left at bedside.   "

## 2024-03-20 NOTE — PLAN OF CARE
Goal Outcome Evaluation:  Plan of Care Reviewed With: patient        Progress: improving  Outcome Evaluation: Vitals stable. IVF cont'd. Pt doesn't c/o any N/V. ostomy intact and having output (has not been emptied yet). Pt voiding. Pt ambulated several times around unit and up to chair. Pt needs met at this time. Will continue to monitor.

## 2024-03-20 NOTE — PROGRESS NOTES
Patient doing well  Having ostomy output  Has some pain at surgical site    Continue increasing activity  Likely home tomorrow

## 2024-03-20 NOTE — SIGNIFICANT NOTE
03/20/24 1331   OTHER   Discipline physical therapist   Rehab Time/Intention   Session Not Performed other (see comments)  (Pt ambulated 1200ft with nsg this AM and per RN stated he felt like he could've gone further. No indication for acute PT needs, will sign-off.)   Therapy Assessment/Plan (PT)   Criteria for Skilled Interventions Met (PT) no;no problems identified which require skilled intervention

## 2024-03-20 NOTE — PROGRESS NOTES
Progress Note    Pod 1      S: Pt resting comfortably in bed. Experiencing pain along the left side of the abdomen where the ostomy is located, but states the pain is significantly better than yesterday. Pain controlled with ERAS. Denies ambulating since surgery. Tolerating regular diet. Says he is hungry this morning.     O:  Temp:  [97 °F (36.1 °C)-99.1 °F (37.3 °C)] 97 °F (36.1 °C)  Heart Rate:  [58-94] 58  Resp:  [15-22] 16  BP: (108-167)/(59-99) 108/59      Intake/Output Summary (Last 24 hours) at 3/20/2024 0752  Last data filed at 3/20/2024 0653  Gross per 24 hour   Intake 2276.67 ml   Output 2455 ml   Net -178.33 ml       Abd:   soft, non-distended  Incision: clean, dry  Ostomy: Pink and patent with serosanguinous drainage in bag. Non-productive of stool.       Results from last 7 days   Lab Units 03/20/24  0308   WBC 10*3/mm3 13.54*   HEMOGLOBIN g/dL 10.8*   HEMATOCRIT % 32.9*   PLATELETS 10*3/mm3 177     Results from last 7 days   Lab Units 03/20/24  0308   SODIUM mmol/L 142   POTASSIUM mmol/L 3.5   CHLORIDE mmol/L 107   CO2 mmol/L 25.0   BUN mg/dL 24*   CREATININE mg/dL 1.52*   EGFR mL/min/1.73 47.2*   GLUCOSE mg/dL 120*   CALCIUM mg/dL 7.9*       Results from last 7 days   Lab Units 03/20/24  0308   MAGNESIUM mg/dL 2.2       A/P: 78 y.o. male s/p OPEN REVISION OF COLOSTOMY, REPAIR OF PARASTOMAL HERNIA.  LAPAROSCOPIC LYSIS OF ADHESIONS WITH DAVINCI ROBOT    - Continue regular diet. Awaiting bowel function. Continue Entereg.   - Cr elevated at 1.52. D/C Celebrex and increase IVF to 75 mL/Hr  - Will repeat CBC and renal panel tomorrow morning  - Encouraged ambulation and sitting up in chair for meals

## 2024-03-20 NOTE — CASE MANAGEMENT/SOCIAL WORK
Continued Stay Note  [facility]     Patient Name: Darwin Sanhcez  MRN: [id number]  Today's Date: 3/20/2024    Admit Date: 3/19/2024    Plan: Home with wife and [facility]. Family to transport at D/C.   Discharge Plan       Row Name 03/20/24 1452       Plan    Plan Home with wife and [facility]. Family to transport at D/C.    Patient/Family in Agreement with Plan yes    Plan Comments Met with pt. at bedside. Explained roll of . Face sheet and pharmacy verified. Pt lives with wife.  There are 3-4 steps to enter home. Home DME includes a glucometer, CPAP, bedrail, ostomy supplies, shower chair, walker, and cane.  Pt is independent with ADLs. States he walks 4 times a week and uses his cane when he does.  Pt has never been to Rehab. He has used [facility] in the past.  [facility] as his stoma is now bigger and they want him to use a different appliance. Referral placed and they have accepted. Pt's PCP is Niko Patrick MD. Pt enrolled with Meds to Bed. Pt normally drives. At discharge, family will transport. Explained that CCP would follow to assess for discharge needs.  Silvano Ruiz RN-BC                   Discharge Codes    No documentation.                 Expected Discharge Date and Time       Expected Discharge Date Expected Discharge Time    Mar 22, 2024               Silvano Ruiz RN

## 2024-03-20 NOTE — PLAN OF CARE
Goal Outcome Evaluation:   Patient told me that he wants to have some rest and sleep today and will ambulate in the morning. Able to sleep fairly with his own CPAP machine. VS stable no other complaints made. All due medication given , continue to monitor.

## 2024-03-20 NOTE — DISCHARGE PLACEMENT REQUEST
"Darwin Cuba W \"ÁNGELA\" (78 y.o. Male)       Date of Birth   1945    Social Security Number       Address   [address] [place] [address] [place] [facility]    Home Phone   [phone]    MRN   [id number]       [employer]   Gnosticism    Marital Status                               Admission Date   3/19/24    Admission Type   Elective    Admitting Provider   Makeda Ocasio MD    Attending Provider   Makeda Ocasio MD    Department, Room/Bed   [address], [address]/1       Discharge Date       Discharge Disposition       Discharge Destination                                 Attending Provider: Makeda Ocasio MD    Allergies: Carvedilol, Hydrocodone    Isolation: None   Infection: None   Code Status: CPR    Ht: 182.9 cm (72\")   Wt: 117 kg (257 lb 15 oz)    Admission Cmt: None   Principal Problem: Parastomal hernia without obstruction or gangrene [K43.5]                   Active Insurance as of 3/19/2024       Primary Coverage       Payor Plan Insurance Group Employer/Plan Group    MEDICARE MEDICARE A & B        Payor Plan Address Payor Plan Phone Number Payor Plan Fax Number Effective Dates    [address] [phone]  12/1/2012 - None Entered    [facility] [id number]         Subscriber Name Subscriber Birth Date Member ID       DARWIN CUBA W 1945 [id number]               Secondary Coverage       Payor Plan Insurance Group Employer/Plan Group    [facility] SUPP [id number]       Payor Plan Address Payor Plan Phone Number Payor Plan Fax Number Effective Dates    [address]   4/1/2021 - None Entered    [facility] [id number]         Subscriber Name Subscriber Birth Date Member ID       DARWIN CUBA W 1945 [id number]                     Emergency Contacts        (Rel.) Home Phone Work Phone Mobile Phone    Mayra Cuba (Spouse) -- -- [phone]    ZI VANCE (Daughter) [phone] -- --    ROSA ELENAREYNA (Son) -- -- [phone] "

## 2024-03-20 NOTE — CASE MANAGEMENT/SOCIAL WORK
Discharge Planning Assessment  [facility]     Patient Name: Darwin Sanchez  MRN: [id number]  Today's Date: 3/20/2024    Admit Date: 3/19/2024    Plan: Home with wife and [facility]. Family to transport at D/C.   Discharge Needs Assessment       Row Name 03/20/24 1424       Living Environment    People in Home spouse    Current Living Arrangements home    Potentially Unsafe Housing Conditions none    In the past 12 months has the electric, gas, oil, or water company threatened to shut off services in your home? No    Primary Care Provided by self    Provides Primary Care For no one    Family Caregiver if Needed spouse;child(claudio), adult    Quality of Family Relationships involved;helpful    Able to Return to Prior Arrangements yes       Resource/Environmental Concerns    Resource/Environmental Concerns none    Transportation Concerns none       Transportation Needs    In the past 12 months, has lack of transportation kept you from medical appointments or from getting medications? no    In the past 12 months, has lack of transportation kept you from meetings, work, or from getting things needed for daily living? No       Food Insecurity    Within the past 12 months, you worried that your food would run out before you got the money to buy more. Never true    Within the past 12 months, the food you bought just didn't last and you didn't have money to get more. Never true       Transition Planning    Patient/Family Anticipates Transition to home with family    Patient/Family Anticipated Services at Transition home health care    Transportation Anticipated family or friend will provide       Discharge Needs Assessment    Readmission Within the Last 30 Days no previous admission in last 30 days    Equipment Currently Used at Home glucometer;cpap;colostomy/ostomy supplies;shower chair;cane, straight;walker, rolling;other (see comments)  Bed rail    Concerns to be Addressed care coordination/care conferences;discharge  planning    Anticipated Changes Related to Illness none    Equipment Needed After Discharge colostomy/ostomy supplies  States they want him use new ostomy appliances    Discharge Facility/Level of Care Needs home with home health    Provided Post Acute Provider List? Refused    Refused Provider List Comment States will use [facility] again    Provided Post Acute Provider Quality & Resource List? Refused                   Discharge Plan       Row Name 03/20/24 5385       Plan    Plan Home with wife and [facility]. Family to transport at D/C.    Patient/Family in Agreement with Plan yes    Plan Comments Met with pt. at bedside. Explained roll of . Face sheet and pharmacy verified. Pt lives with wife.  There are 3-4 steps to enter home. Home DME includes a glucometer, CPAP, bedrail, ostomy supplies, shower chair, walker, and cane.  Pt is independent with ADLs. States he walks 4 times a week and uses his cane when he does.  Pt has never been to Rehab. He has used [facility] in the past.  Pt states he would like [facility] to see him at D/C as his stoma is now bigger and they want him to use a different appliance. Referral placed and they have accepted. Pt's PCP is Niko Patrick MD. Pt enrolled with Meds to Bed. Pt normally drives. At discharge, family will transport. Explained that CCP would follow to assess for discharge needs.  Silvano Ruiz RN-BC                  Continued Care and Services - Admitted Since 3/19/2024       Home Medical Care       Service Provider Request Status Selected Services Address Phone Fax Patient Preferred     [facility] [address] [facility] [address] [phone] --    [facility] Pending - Request Sent N/A [id number] [address] [phone] --                  Expected Discharge Date and Time       Expected Discharge Date Expected Discharge Time    Mar 22, 2024            Demographic Summary        Row Name 03/20/24 1423       General Information    Admission Type inpatient    Arrived From PACU/recovery room    Required Notices Provided Important Message from Medicare    Reason for Consult discharge planning;care coordination/care conference    Preferred Language English                   Functional Status       Row Name 03/20/24 1424       Functional Status    Usual Activity Tolerance good    Current Activity Tolerance moderate       Functional Status, IADL    Medications independent    Meal Preparation independent    Housekeeping independent    Laundry independent    Shopping independent       Mental Status Summary    Recent Changes in Mental Status/Cognitive Functioning no changes       Employment/    Employment Status retired                     Silvano Ruiz RN

## 2024-03-21 ENCOUNTER — TELEPHONE (OUTPATIENT)
Dept: ONCOLOGY | Facility: CLINIC | Age: 79
End: 2024-03-21
Payer: MEDICARE

## 2024-03-21 LAB
ALBUMIN SERPL-MCNC: 3.4 G/DL (ref 3.5–5.2)
ANION GAP SERPL CALCULATED.3IONS-SCNC: 11.1 MMOL/L (ref 5–15)
BASOPHILS # BLD AUTO: 0.05 10*3/MM3 (ref 0–0.2)
BASOPHILS NFR BLD AUTO: 0.6 % (ref 0–1.5)
BUN SERPL-MCNC: 21 MG/DL (ref 8–23)
BUN/CREAT SERPL: 20.2 (ref 7–25)
CALCIUM SPEC-SCNC: 8.5 MG/DL (ref 8.6–10.5)
CHLORIDE SERPL-SCNC: 107 MMOL/L (ref 98–107)
CO2 SERPL-SCNC: 25.9 MMOL/L (ref 22–29)
CREAT SERPL-MCNC: 1.04 MG/DL (ref 0.76–1.27)
DEPRECATED RDW RBC AUTO: 38.5 FL (ref 37–54)
EGFRCR SERPLBLD CKD-EPI 2021: 74.4 ML/MIN/1.73
EOSINOPHIL # BLD AUTO: 0.16 10*3/MM3 (ref 0–0.4)
EOSINOPHIL NFR BLD AUTO: 1.9 % (ref 0.3–6.2)
ERYTHROCYTE [DISTWIDTH] IN BLOOD BY AUTOMATED COUNT: 12.1 % (ref 12.3–15.4)
GLUCOSE SERPL-MCNC: 108 MG/DL (ref 65–99)
HCT VFR BLD AUTO: 33.6 % (ref 37.5–51)
HGB BLD-MCNC: 10.9 G/DL (ref 13–17.7)
IMM GRANULOCYTES # BLD AUTO: 0.03 10*3/MM3 (ref 0–0.05)
IMM GRANULOCYTES NFR BLD AUTO: 0.4 % (ref 0–0.5)
LAB AP CASE REPORT: NORMAL
LYMPHOCYTES # BLD AUTO: 1.52 10*3/MM3 (ref 0.7–3.1)
LYMPHOCYTES NFR BLD AUTO: 18 % (ref 19.6–45.3)
MCH RBC QN AUTO: 28.3 PG (ref 26.6–33)
MCHC RBC AUTO-ENTMCNC: 32.4 G/DL (ref 31.5–35.7)
MCV RBC AUTO: 87.3 FL (ref 79–97)
MONOCYTES # BLD AUTO: 0.71 10*3/MM3 (ref 0.1–0.9)
MONOCYTES NFR BLD AUTO: 8.4 % (ref 5–12)
NEUTROPHILS NFR BLD AUTO: 5.98 10*3/MM3 (ref 1.7–7)
NEUTROPHILS NFR BLD AUTO: 70.7 % (ref 42.7–76)
NRBC BLD AUTO-RTO: 0 /100 WBC (ref 0–0.2)
PATH REPORT.FINAL DX SPEC: NORMAL
PATH REPORT.GROSS SPEC: NORMAL
PHOSPHATE SERPL-MCNC: 2 MG/DL (ref 2.5–4.5)
PHOSPHATE SERPL-MCNC: 3.6 MG/DL (ref 2.5–4.5)
PLATELET # BLD AUTO: 168 10*3/MM3 (ref 140–450)
PMV BLD AUTO: 11.1 FL (ref 6–12)
POTASSIUM SERPL-SCNC: 3.2 MMOL/L (ref 3.5–5.2)
POTASSIUM SERPL-SCNC: 3.5 MMOL/L (ref 3.5–5.2)
RBC # BLD AUTO: 3.85 10*6/MM3 (ref 4.14–5.8)
SODIUM SERPL-SCNC: 144 MMOL/L (ref 136–145)
WBC NRBC COR # BLD AUTO: 8.45 10*3/MM3 (ref 3.4–10.8)

## 2024-03-21 PROCEDURE — 25010000002 ENOXAPARIN PER 10 MG: Performed by: COLON & RECTAL SURGERY

## 2024-03-21 PROCEDURE — 84132 ASSAY OF SERUM POTASSIUM: CPT | Performed by: COLON & RECTAL SURGERY

## 2024-03-21 PROCEDURE — 85025 COMPLETE CBC W/AUTO DIFF WBC: CPT | Performed by: PHYSICIAN ASSISTANT

## 2024-03-21 PROCEDURE — 99024 POSTOP FOLLOW-UP VISIT: CPT | Performed by: PHYSICIAN ASSISTANT

## 2024-03-21 PROCEDURE — 80069 RENAL FUNCTION PANEL: CPT | Performed by: PHYSICIAN ASSISTANT

## 2024-03-21 PROCEDURE — 25010000002 HYDROMORPHONE PER 4 MG: Performed by: COLON & RECTAL SURGERY

## 2024-03-21 PROCEDURE — 84100 ASSAY OF PHOSPHORUS: CPT | Performed by: COLON & RECTAL SURGERY

## 2024-03-21 RX ORDER — POTASSIUM CHLORIDE 750 MG/1
40 TABLET, FILM COATED, EXTENDED RELEASE ORAL EVERY 4 HOURS
Status: COMPLETED | OUTPATIENT
Start: 2024-03-21 | End: 2024-03-21

## 2024-03-21 RX ORDER — CLONIDINE HYDROCHLORIDE 0.1 MG/1
0.2 TABLET ORAL 2 TIMES DAILY
Status: DISCONTINUED | OUTPATIENT
Start: 2024-03-21 | End: 2024-03-22 | Stop reason: HOSPADM

## 2024-03-21 RX ORDER — OXYCODONE HYDROCHLORIDE 5 MG/1
5 TABLET ORAL EVERY 4 HOURS PRN
Status: DISCONTINUED | OUTPATIENT
Start: 2024-03-21 | End: 2024-03-22 | Stop reason: HOSPADM

## 2024-03-21 RX ORDER — CHLORTHALIDONE 25 MG/1
25 TABLET ORAL DAILY
Status: DISCONTINUED | OUTPATIENT
Start: 2024-03-21 | End: 2024-03-22 | Stop reason: HOSPADM

## 2024-03-21 RX ORDER — CELECOXIB 100 MG/1
100 CAPSULE ORAL EVERY 12 HOURS SCHEDULED
Status: DISCONTINUED | OUTPATIENT
Start: 2024-03-21 | End: 2024-03-22 | Stop reason: HOSPADM

## 2024-03-21 RX ORDER — LOSARTAN POTASSIUM 100 MG/1
100 TABLET ORAL DAILY
Status: DISCONTINUED | OUTPATIENT
Start: 2024-03-21 | End: 2024-03-22 | Stop reason: HOSPADM

## 2024-03-21 RX ADMIN — GABAPENTIN 200 MG: 100 CAPSULE ORAL at 21:00

## 2024-03-21 RX ADMIN — LOSARTAN POTASSIUM 100 MG: 100 TABLET, FILM COATED ORAL at 12:16

## 2024-03-21 RX ADMIN — CELECOXIB 100 MG: 100 CAPSULE ORAL at 20:59

## 2024-03-21 RX ADMIN — CHLORTHALIDONE 25 MG: 25 TABLET ORAL at 12:16

## 2024-03-21 RX ADMIN — CELECOXIB 100 MG: 100 CAPSULE ORAL at 09:53

## 2024-03-21 RX ADMIN — POTASSIUM CHLORIDE 40 MEQ: 750 TABLET, EXTENDED RELEASE ORAL at 08:41

## 2024-03-21 RX ADMIN — Medication 2 PACKET: at 08:41

## 2024-03-21 RX ADMIN — HYDROMORPHONE HYDROCHLORIDE 0.25 MG: 1 INJECTION, SOLUTION INTRAMUSCULAR; INTRAVENOUS; SUBCUTANEOUS at 08:41

## 2024-03-21 RX ADMIN — CLONIDINE HYDROCHLORIDE 0.2 MG: 0.1 TABLET ORAL at 20:59

## 2024-03-21 RX ADMIN — CLONIDINE HYDROCHLORIDE 0.2 MG: 0.1 TABLET ORAL at 08:51

## 2024-03-21 RX ADMIN — GABAPENTIN 200 MG: 100 CAPSULE ORAL at 08:41

## 2024-03-21 RX ADMIN — ACETAMINOPHEN 1000 MG: 500 TABLET ORAL at 12:16

## 2024-03-21 RX ADMIN — POTASSIUM CHLORIDE 40 MEQ: 750 TABLET, EXTENDED RELEASE ORAL at 12:16

## 2024-03-21 RX ADMIN — ACETAMINOPHEN 1000 MG: 500 TABLET ORAL at 17:57

## 2024-03-21 RX ADMIN — ENOXAPARIN SODIUM 40 MG: 100 INJECTION SUBCUTANEOUS at 08:41

## 2024-03-21 RX ADMIN — ACETAMINOPHEN 1000 MG: 500 TABLET ORAL at 05:16

## 2024-03-21 RX ADMIN — POTASSIUM CHLORIDE 40 MEQ: 750 TABLET, EXTENDED RELEASE ORAL at 21:05

## 2024-03-21 RX ADMIN — POTASSIUM CHLORIDE 40 MEQ: 750 TABLET, EXTENDED RELEASE ORAL at 17:57

## 2024-03-21 NOTE — PLAN OF CARE
Goal Outcome Evaluation:   Patient tolerated on RA  then wears his CPAP at HS. Voiding freely and adequately with the use of urinal. Might be discharge today.

## 2024-03-21 NOTE — PROGRESS NOTES
Progress Note    Pod 2    S: Tolerating regular diet. Having ostomy output. Positive ambulation. Experiencing more left-sided abdominal pain today. Concerns about going home while in pain and states tomorrow may work better as far as his children picking him up from the hospital.     O:  Temp:  [97.5 °F (36.4 °C)-99 °F (37.2 °C)] 98.6 °F (37 °C)  Heart Rate:  [67-70] 70  Resp:  [16-18] 18  BP: (141-165)/(72-91) 165/91      Intake/Output Summary (Last 24 hours) at 3/21/2024 0845  Last data filed at 3/21/2024 0750  Gross per 24 hour   Intake 1425 ml   Output 2950 ml   Net -1525 ml       Abd:   soft, non-distended  Incision: clean, dry  Ostomy: PPP with loose brown stool in bag      Results from last 7 days   Lab Units 03/21/24  0515   WBC 10*3/mm3 8.45   HEMOGLOBIN g/dL 10.9*   HEMATOCRIT % 33.6*   PLATELETS 10*3/mm3 168     Results from last 7 days   Lab Units 03/21/24  0515   SODIUM mmol/L 144   POTASSIUM mmol/L 3.2*   CHLORIDE mmol/L 107   CO2 mmol/L 25.9   BUN mg/dL 21   CREATININE mg/dL 1.04   EGFR mL/min/1.73 74.4   GLUCOSE mg/dL 108*   CALCIUM mg/dL 8.5*   PHOSPHORUS mg/dL 2.0*         A/P: 78 y.o. male with s/p OPEN REVISION OF COLOSTOMY, REPAIR OF PARASTOMAL HERNIA.  LAPAROSCOPIC LYSIS OF ADHESIONS WITH DAVINCI ROBOT    - Cr improved from 1.52 to 1.04 today. Will restart Celebrex to help with pain.   - Having ostomy output. D/C Entereg.   - Hep Lock  - Phosphorus 2.0 and K+ 3.2. Will replace.   - Increased abdominal pain likely a combination between Pt's increased physical activity and the TAP block wearing off. Will monitor pain today. May be able to D/C home this afternoon if feeling better, or home tomorrow.

## 2024-03-21 NOTE — CASE MANAGEMENT/SOCIAL WORK
Continued Stay Note  [facility]     Patient Name: Darwin Sanchez  MRN: [id number]  Today's Date: 3/21/2024    Admit Date: 3/19/2024    Plan: Home with wife and HH. (Pt wants a HH that can send the same person to visits) Family to transport at D/C.   Discharge Plan       Row Name 03/21/24 1725       Plan    Plan Home with wife and HH. (Pt wants a HH that can send the same person to visits) Family to transport at D/C.    Patient/Family in Agreement with Plan yes    Plan Comments Pt wants a HH that can send the same person to visits. [facility] stated they would not be able to accommodate due to staffing issues. Spoke with [facility] and they are unable to provide same person to visits. Called [facility] and they can have admission nurse see pt at first visit and after that pt would have the same nurse. Discussed HH findings with pt at bedside and he stated he wants to speak with his family before he makes a decision. Will follow up in AM. Silvano Ruiz RN-BC                   Discharge Codes    No documentation.                 Expected Discharge Date and Time       Expected Discharge Date Expected Discharge Time    Mar 22, 2024               Silvano Ruiz RN

## 2024-03-21 NOTE — PLAN OF CARE
Goal Outcome Evaluation:  Plan of Care Reviewed With: patient        Progress: no change  Outcome Evaluation: Vitals stable. Pt ambulating. Ostomy intact and having output. Pt voiding. Pt given prn medication x1 this am. Possible dc home tomorrow 3/22. Pt needs met and questions answeered. will continue to monitor.

## 2024-03-21 NOTE — NURSING NOTE
CWOCN- patient's ostomy appliance is intact. He says he feels ok- he is sore and did not sleep well. He believes he will be discharged tomorrow. [facility] team to meet with him and his son at 9:00 for ostomy pouch change/assessment/teaching. Will see what supplies should work best for the moment. They understand it might need to be modified from before in the future as his abdomen heals.      03/21/24 1501   Colostomy LLQ   Placement Date/Time: 04/06/21 1444   Inserted by: Dr. Ocasio  Hand Hygiene Completed: Yes  Location: LLQ   Stomal Appliance 1 piece;Intact;Drainable   Stoma Appearance round;rosebud appearance;swollen;moist;red   Peristomal Assessment MANISH   Stoma Function stool   Stool Color brown   Stool Consistency creamy   Treatment Placement checked   Output (mL) 75 mL

## 2024-03-21 NOTE — PROGRESS NOTES
Patient complains about soreness  Enjoyed the Dilaudid earlier today  Abdomen is soft  Ostomy is pink patent productive    Discussed with patient and daughter that he cannot go home with Dilaudid.  Oxycodone, hydrocodone, Dilaudid, morphine are all opioids and have the issues with hallucinations, constipation, dependence.  Continue Tylenol , gabapentin and Celebrex.

## 2024-03-22 ENCOUNTER — READMISSION MANAGEMENT (OUTPATIENT)
Dept: CALL CENTER | Facility: HOSPITAL | Age: 79
End: 2024-03-22
Payer: MEDICARE

## 2024-03-22 VITALS
RESPIRATION RATE: 18 BRPM | WEIGHT: 257.94 LBS | HEIGHT: 72 IN | SYSTOLIC BLOOD PRESSURE: 144 MMHG | OXYGEN SATURATION: 97 % | DIASTOLIC BLOOD PRESSURE: 89 MMHG | TEMPERATURE: 98.4 F | HEART RATE: 62 BPM | BODY MASS INDEX: 34.94 KG/M2

## 2024-03-22 LAB — POTASSIUM SERPL-SCNC: 4.2 MMOL/L (ref 3.5–5.2)

## 2024-03-22 PROCEDURE — 25010000002 ENOXAPARIN PER 10 MG: Performed by: COLON & RECTAL SURGERY

## 2024-03-22 PROCEDURE — 84132 ASSAY OF SERUM POTASSIUM: CPT | Performed by: COLON & RECTAL SURGERY

## 2024-03-22 PROCEDURE — 99024 POSTOP FOLLOW-UP VISIT: CPT | Performed by: PHYSICIAN ASSISTANT

## 2024-03-22 RX ORDER — ACETAMINOPHEN 500 MG
1000 TABLET ORAL EVERY 6 HOURS
Start: 2024-03-22

## 2024-03-22 RX ADMIN — GABAPENTIN 200 MG: 100 CAPSULE ORAL at 08:23

## 2024-03-22 RX ADMIN — CHLORTHALIDONE 25 MG: 25 TABLET ORAL at 08:23

## 2024-03-22 RX ADMIN — ENOXAPARIN SODIUM 40 MG: 100 INJECTION SUBCUTANEOUS at 08:23

## 2024-03-22 RX ADMIN — CLONIDINE HYDROCHLORIDE 0.2 MG: 0.1 TABLET ORAL at 08:23

## 2024-03-22 RX ADMIN — ACETAMINOPHEN 1000 MG: 500 TABLET ORAL at 06:24

## 2024-03-22 RX ADMIN — ACETAMINOPHEN 1000 MG: 500 TABLET ORAL at 01:17

## 2024-03-22 RX ADMIN — LOSARTAN POTASSIUM 100 MG: 100 TABLET, FILM COATED ORAL at 08:23

## 2024-03-22 RX ADMIN — CELECOXIB 100 MG: 100 CAPSULE ORAL at 08:23

## 2024-03-22 NOTE — CASE MANAGEMENT/SOCIAL WORK
Case Management Discharge Note      Final Note: HOme with wife and [facility]. Family transport    Provided Post Acute Provider List?: Refused  Refused Provider List Comment: [facility]  Provided Post Acute Provider Quality & Resource List?: Refused    Selected Continued Care - Discharged on 3/22/2024 Admission date: 3/19/2024 - Discharge disposition: Home-Health Care Svc      Destination    No services have been selected for the patient.                Durable Medical Equipment    No services have been selected for the patient.                Dialysis/Infusion    No services have been selected for the patient.                Home Medical Care Coordination complete.      Service Provider Selected Services Address Phone Fax Patient Preferred    [facility] [address], [address], [facility] [address] [phone] --              Therapy    No services have been selected for the patient.                Community Resources    No services have been selected for the patient.                Community & DME    No services have been selected for the patient.                    Transportation Services  Private: Car    Final Discharge Disposition Code: 06 - home with home health care

## 2024-03-22 NOTE — NURSING NOTE
CWOCN- follow up for pouch change with patient and son. Patient has been in a 1 piece pouch with barrier ring.   I removed the pouch- it was intact. Discussed with patient and son that I would recommend to continue this pouching for now. If needed, a bead of paste could be used where the wound was sutured and is healing as there is a lift/dimple to the skin. Paste would help fill that in if it becomes a problem. For now, however I would use a barrier ring around the stoma and a piece of opticel ag at the healing suture site.   Discussed that he may end up needing soft convexity. I think with that and a belt, he could use his stomahesive paste if he wants to. It may be a little trial and error to see what works best but I would not use deep convex. He verbalized understanding. He can call us with issues/questions. They understand the plan.   I have left appliances in the room. I will request samples from [employer] also.        03/22/24 0935   Colostomy LLQ   Placement Date/Time: 04/06/21 1444   Inserted by: Dr. Ocasio  Hand Hygiene Completed: Yes  Location: LLQ   Stomal Appliance 1 piece;Intact;Changed;Drainable   Stoma Appearance round;rosebud appearance;moist;red;protruding above skin level   Peristomal Assessment Other (Comment)  (wound sutured above stoma- placed opticel ag)   Accessories/Skin Care cleansed with water;skin barrier ring   Stoma Function stool;flatus   Stool Color brown   Stool Consistency creamy   Treatment Bag change   Output (mL) 50 mL

## 2024-03-22 NOTE — PLAN OF CARE
Goal Outcome Evaluation:  Plan of Care Reviewed With: patient           Outcome Evaluation: Colostomy intact emptying brown liquid output. C/o abdominal soreness.VSS. CPAP at HS.

## 2024-03-22 NOTE — DISCHARGE SUMMARY
Date of Admission: 3/19/2024  Date of Discharge: 03/22/24     Presenting Problem/History of Present Illness  Parastomal hernia without obstruction or gangrene [K43.5]     Discharge Diagnosis:  Active Hospital Problems    Diagnosis  POA    **Parastomal hernia without obstruction or gangrene [K43.5]  Unknown      Resolved Hospital Problems   No resolved problems to display.        Procedures Performed  Procedure(s):  OPEN REVISION OF COLOSTOMY, REPAIR OF PARASTOMAL HERNIA.  LAPAROSCOPIC LYSIS OF ADHESIONS WITH DAVINCI ROBOT    Hospital Course  Patient is a 78 y.o. male is s/p robotic-assisted laparoscopic lysis of adhesions, with open revision of colostomy and repair of parastomal hernia with mesh placement 03/19/2024. Pt started on a clear liquid diet following surgery. This was advanced to a regular diet and Pt is tolerating PO intake well without N/V. Having bowel and bladder function. Pain controlled with ERAS and PRN PO medications. Positive ambulation. Pt comfortable being D/C home today.      Consults:   Consults       No orders found from 2/19/2024 to 3/20/2024.            Discharge Disposition  Home-Health Care [facility]    Discharge Medications     Discharge Medications        New Medications        Instructions Start Date   acetaminophen 500 MG tablet  Commonly known as: TYLENOL   1,000 mg, Oral, Every 6 Hours             Continue These Medications        Instructions Start Date   atorvastatin 40 MG tablet  Commonly known as: LIPITOR   80 mg, Oral, Daily      chlorthalidone 25 MG tablet  Commonly known as: HYGROTON   25 mg, Oral, Daily      clobetasol propionate 0.05 % cream  Commonly known as: TEMOVATE   1 application , Topical, 2 Times Daily PRN      cloNIDine 0.1 MG tablet  Commonly known as: CATAPRES   0.2 mg, Oral, 2 Times Daily      FreeStyle Fátima 2 [place] device   USE AS DIRECTED WITH THE SENSOR DAILY.      FreeStyle Fátima 2 Sensor misc       indomethacin SR 75 MG CR capsule  Commonly known as: INDOCIN  SR   75 mg, Oral, 2 Times Daily With Meals, HOLD PRIOR TO  PER MD INSTRUCTIONS      losartan 100 MG tablet  Commonly known as: COZAAR   100 mg, Oral, Daily      zolpidem 5 MG tablet  Commonly known as: AMBIEN   5 mg, Oral, Nightly PRN             Stop These Medications      hydroCHLOROthiazide 12.5 MG tablet                Activity at Discharge:   Activity Instructions       Driving Restrictions      Type of Restriction: Driving    Driving Restrictions: No Driving Until Next Appointment    Gradually Increase Activity Until at Pre-Hospitalization Level      Lifting Restrictions      Type of Restriction: Lifting    Lifting Restrictions: Lifting Restriction (Indicate Limit)    Weight Limit (Pounds): 15    Length of Lifting Restriction: until office appt            Follow-up Appointments  Future Appointments   Date Time Provider Department Center   4/4/2024 11:30 AM [facility] CT 1  [facility] CT BR None   4/12/2024 11:15 AM Alessandro March MD [facility]  [facility]   4/16/2024 12:40 PM LAB CHAIR 6 [facility]  [facility]   4/16/2024  1:00 PM Franco Mcfadden MD [facility]     Additional Instructions for the Follow-ups that You Need to Schedule       Ambulatory Referral to Home Health ([facility])   As directed      Face to Face Visit Date: 3/22/2024   Follow-up provider for Plan of Care?: I will be treating the patient on an ongoing basis.  Please send me the Plan of Care for signature.   Follow-up provider: ALESSANDRO MARCH [[id number]]   Reason/Clinical Findings: New Ostomy   Describe mobility limitations that make leaving home difficult: Post-op   Nursing/Therapeutic Services Requested: Skilled Nursing   Skilled nursing orders: Ostomy instruction   Frequency: 1 Week 1        Call MD With Problems / Concerns   As directed      Instructions: temp >101. Drainage from wound. vomitting.    Order Comments: Instructions: temp >101. Drainage from wound. vomitting.         Discharge Follow-up with Specified Provider: Amarjit; 2 Weeks    As directed      To: Amarjit   Follow Up: 2 Weeks                Discharge Diet:   As tolerated  Activity at Discharge:   As tolerated  Follow-up Appointments  2 wks

## 2024-03-23 NOTE — OUTREACH NOTE
Prep Survey      Flowsheet Row Responses   [facility]? [facility]   Is LACE score < 7 ? No   Eligibility Readm Mgmt   Discharge diagnosis *Parastomal hernia without obstruction or gangrene (KOPEN REVISION OF COLOSTOMY, REPAIR OF PARASTOMAL HERNIA.  LAPAROSCOPIC LYSIS OF ADHESIONS WITH DAVINCI ROBOT   Does the patient have one of the following disease processes/diagnoses(primary or secondary)? General Surgery   Does the patient have Home health ordered? Yes   What is the Home health agency?  [facility]   Is there a DME ordered? No   Prep survey completed? Yes            ISIAH RIVAS - Registered Nurse

## 2024-03-26 NOTE — CASE MANAGEMENT/SOCIAL WORK
Case Management/Social Work    Patient Name:  Darwin Sanchez  YOB: 1945  MRN: [id number]  Admit Date:  3/19/2024        Received call from Shea/Parisa stating that she had setup HH for pt and discussed with wife and she was agreeable. Then received call from pt stating they were going with another HH agency.  Pt did not tell Shea/Parisa what agency he was going to use. Reviewed [facility] chart and Care Everywhere and unable to see where another HH was setup. Silvano Ruiz RN-BC      Electronically signed by:  Silvano Ruiz RN  03/26/24 15:50 EDT

## 2024-03-28 NOTE — OP NOTE
Surgeon: Makeda Ocasio MD    Surgical  Assistant: Jessenia Hines PA-C     Preoperative diagnosis: Parastomal hernia without obstruction or gangrene [K43.5]    Post-Op Diagnosis Codes:     * Parastomal hernia without obstruction or gangrene [K43.5]    Procedure: OPEN REVISION OF COLOSTOMY, REPAIR OF PARASTOMAL HERNIA.  LAPAROSCOPIC LYSIS OF ADHESIONS WITH DAVINCI ROBOT, * Panel 2 does not exist *    Estimated Blood Loss: 200 mL    Specimens:   Specimens       ID Source Type Tests Collected By Collected At Frozen?    A Ostomy Tissue TISSUE PATHOLOGY EXAM   Makeda Ocasio MD 3/19/24 0918 No    Description: OSTOMY               Indication:  Darwin Sanchez is a 78 y.o. male who comes in with Parastomal hernia without obstruction or gangrene  Patient understands risks, benefits,and alternatives wishes to proceed.      Procedure Details:  The patient was brought to the operating room with SCDs in place, antibiotics infused. After general anesthesia was achieved a TAP block was then done by Anesthesia and he was then prepped and draped in sterile fashion. I used 0.25% Marcaine with epinephrine as a local infiltration for the trocar sites. In the right midclavicular line I made a stab incision right under the ribs and then used a Veress needle to gain access into the abdomen and insufflated up to 15 mmHg. I then used an 8 mm trocar that was a VisiPort and with a 0-degree 5 mm laparoscope entered the abdomen under direct visualization. There were a small amount of adhesions to the abdominal wall but none that prevented us from getting in. In a vertical line going down the right lateral side of the abdomen, the 3 other trocars were all 8s, and then placed them all under direct visualization. We attached the robotic arms and put the instruments in under direct visualization. We used 3 arms and used this to add 1 arm and then started lysing adhesions off the abdominal wall around the parastomal hernia. Of note, the  patient had an ulceration that was approximately 3 cm that was on the cephalad side of the stoma and his stoma had strictured and was significantly smaller than when it was made several years ago. I did lyse all of the adhesions off. I pulled the omentum off the colon. I got all of those and started taking the hernia sac down and decided that because of the stricture and the ulceration that he may have an inflammatory process going on and I would prefer to remove the end of the colostomy and needed to do that first before repairing the hernia because I did not want to make the opening too tight or put mesh in there and cause the colon not to be able to come up and then have to go back in the abdomen. So, at this point we undocked the robot. I scrubbed back in and made an elliptical incision around the ulceration and the ostomy and brought the colon approximately 5 cm up and used a blue load of the stapler to go across the colon. I took the mesentery with clamps and #0 Vicryl ties and sent the specimen off. I then repaired the parastomal hernia primarily with #1 PDS. I placed sutures on both sides of the ostomy in the fascia. I closed the fascia with several interrupted sutures on both sides, made sure that it was not too tight, and then matured the colostomy using 3-0 Vicryl. I had to close the area where we took the ulceration off. I used 2-0 Vicryl in horizontal mattress to close the skin there. Of note, we did take out all of the trocars and closed the skin with 4-0 Monocryl and placed glue as dressing. All instrument, lap counts and needle counts were correct. The patient was stable throughout the entire case and was taken to recovery.         Assistant: Jessenia Hines PA-C was responsible for performing the following activities: Retraction, Suction, Irrigation, Suturing, Closing and Placing Dressing and their skilled assistance was necessary for the success of this case

## 2024-03-29 ENCOUNTER — READMISSION MANAGEMENT (OUTPATIENT)
Dept: CALL CENTER | Facility: HOSPITAL | Age: 79
End: 2024-03-29
Payer: MEDICARE

## 2024-03-29 NOTE — OUTREACH NOTE
General Surgery Week 1 Survey      Flowsheet Row Responses   [facility] patient discharged from? [place]   Does the patient have one of the following disease processes/diagnoses(primary or secondary)? General Surgery   Week 1 attempt successful? No   Unsuccessful attempts Attempt 1            Jacquelyn GANDARA - Licensed Nurse

## 2024-04-02 ENCOUNTER — TELEPHONE (OUTPATIENT)
Dept: ONCOLOGY | Facility: CLINIC | Age: 79
End: 2024-04-02
Payer: MEDICARE

## 2024-04-02 NOTE — TELEPHONE ENCOUNTER
Caller: Mayra Sanchez    Relationship to patient: Emergency Contact    Best call back number: [phone]    Chief complaint: RESCHEDULE     Type of visit: LAB AND FOLLOW UP     Requested date: AFTER CT SCAN WHICH IS 4-26      If rescheduling, when is the original appointment: 4-16     Additional notes:PLEASE ADVISE

## 2024-04-08 ENCOUNTER — READMISSION MANAGEMENT (OUTPATIENT)
Dept: CALL CENTER | Facility: HOSPITAL | Age: 79
End: 2024-04-08
Payer: MEDICARE

## 2024-04-08 NOTE — OUTREACH NOTE
General Surgery Week 3 Survey      Flowsheet Row Responses   [facility] patient discharged from? [place]   Does the patient have one of the following disease processes/diagnoses(primary or secondary)? General Surgery   Week 3 attempt successful? Yes   Call start time 1529   Call end time 1539   Discharge diagnosis *Parastomal hernia without obstruction or gangrene (KOPEN REVISION OF COLOSTOMY, REPAIR OF PARASTOMAL HERNIA.  LAPAROSCOPIC LYSIS OF ADHESIONS WITH DAVINCI ROBOT   Is patient permission given to speak with other caregiver? Yes   List who call center can speak with Moses son   Person spoke with today (if not patient) and relationship Moses son   Meds reviewed with patient/caregiver? Yes   Is the patient taking all medications as directed (includes completed medication regime)? Yes   Does the patient have a follow up appointment scheduled with their surgeon? Yes  [4/12/24 post op apt]   Has the patient kept scheduled appointments due by today? N/A   What is the Home health agency?  [facility]   Home health comments [facility] was denied by pt (per son)   Did the patient receive a copy of their discharge instructions? Yes   Nursing interventions Reviewed instructions with patient   What is the patient's perception of their health status since discharge? Same  [BP issues at times,  ostomy education needed per son-encouraged pt to call surgeon's office (apt on 4/12/24)]   Is the patient /caregiver able to teach back basic post-op care? Take showers only when approved by MD-sponge bathe until then, Keep incision areas clean,dry and protected, Lifting as instructed by MD in discharge instructions, No tub bath, swimming, or hot tub until instructed by MD   Is the patient/caregiver able to teach back signs and symptoms of incisional infection? Increased redness, swelling or pain at the incisonal site, Increased drainage or bleeding, Incisional warmth, Pus or odor from incision, Fever    Is the patient/caregiver able to teach back steps to recovery at home? Set small, achievable goals for return to baseline health, Rest and rebuild strength, gradually increase activity, Make a list of questions for surgeon's appointment, Eat a well-balance diet, Practice good oral hygiene   If the patient is a current smoker, are they able to teach back resources for cessation? Not a smoker   Is the patient/caregiver able to teach back the hierarchy of who to call/visit for symptoms/problems? PCP, Specialist, Home health nurse, Urgent Care, ED, 911 Yes   Week 3 call completed? Yes   Graduated Yes   Is the patient interested in additional calls from an ambulatory ? Yes   What is the reason the patient needs support from an ACM? Disease Education  [Son interested in learning more about ostomy care]   Call end time 1532            Leyla TRAMMELL - Registered Nurse

## 2024-04-11 ENCOUNTER — PATIENT OUTREACH (OUTPATIENT)
Dept: CASE MANAGEMENT | Facility: OTHER | Age: 79
End: 2024-04-11
Payer: MEDICARE

## 2024-04-11 PROBLEM — I89.0 LYMPHEDEMA OF LOWER EXTREMITY: Status: ACTIVE | Noted: 2022-03-29

## 2024-04-11 PROBLEM — M19.91 LOCALIZED, PRIMARY OSTEOARTHRITIS: Status: ACTIVE | Noted: 2024-04-11

## 2024-04-11 NOTE — OUTREACH NOTE
"AMBULATORY CASE MANAGEMENT NOTE    Name and Relationship of Patient/Support Person: Darwin Sanchez W \"ÁNGELA\" - Self  Self    Patient Outreach    Received referral from  Call Center, to assist pt in learning more about ostomy care. RN-ACM outreach call made to pt, spoke briefly. Explained role of RN-ACM and reason for call. Pt c/o small rash around stoma, states some bags are not holding well. He denies swelling, redness, warmth, pain. Education provided. RN-ACM offered to send educational materials via [employer], pt agrees. Pt states he has follow up appt tomorrow with surgeon, noted in chart. Pt declined home health services, states he did not want a different person coming each time. No other questions or needs per pt, advised him to call RN-ACM or [facility] Nurse Line with any. Follow up outreach prn.     Education Documentation  Ostomy Home Care, taught by Andreina Garcia RN at 4/11/2024 12:08 PM.  Learner: Patient  Readiness: Acceptance  Method: Explanation  Response: Verbalizes Understanding    Colostomy Home Guide, Adult, taught by Andreina Garcia RN at 4/11/2024 12:08 PM.  Learner: Patient  Readiness: Acceptance  Method: Explanation  Response: Verbalizes Understanding    Stoma Appearance Change, taught by Andreina Garcia RN at 4/11/2024 12:08 PM.  Learner: Patient  Readiness: Acceptance  Method: Explanation  Response: Verbalizes Understanding    Pain Unresolved/Worsening, taught by Andreina Garcia, RN at 4/11/2024 12:08 PM.  Learner: Patient  Readiness: Acceptance  Method: Explanation  Response: Verbalizes Understanding    Nausea and Vomiting, taught by Andreina Garcia RN at 4/11/2024 12:08 PM.  Learner: Patient  Readiness: Acceptance  Method: Explanation  Response: Verbalizes Understanding    Infection Signs/Symptoms, taught by Andreina Garcia RN at 4/11/2024 12:08 PM.  Learner: Patient  Readiness: Acceptance  Method: Explanation  Response: Verbalizes Understanding    Difficulty Voiding, taught by Jose, " ALEX Hdz at 4/11/2024 12:08 PM.  Learner: Patient  Readiness: Acceptance  Method: Explanation  Response: Verbalizes Understanding    Blood in Stool, taught by Andreina Garcia RN at 4/11/2024 12:08 PM.  Learner: Patient  Readiness: Acceptance  Method: Explanation  Response: Verbalizes Understanding    Provider Follow-Up, taught by Andreina Garcia RN at 4/11/2024 12:08 PM.  Learner: Patient  Readiness: Acceptance  Method: Explanation  Response: Verbalizes Understanding    Ostomy Management, taught by Andreina Garcia RN at 4/11/2024 12:08 PM.  Learner: Patient  Readiness: Acceptance  Method: Explanation  Response: Verbalizes Understanding          Andreina GANDARA  Ambulatory Case Management    4/11/2024, 12:08 EDT

## 2024-04-12 ENCOUNTER — OFFICE VISIT (OUTPATIENT)
Dept: SURGERY | Facility: CLINIC | Age: 79
End: 2024-04-12
Payer: MEDICARE

## 2024-04-12 VITALS
BODY MASS INDEX: 33.78 KG/M2 | HEART RATE: 64 BPM | HEIGHT: 72 IN | TEMPERATURE: 96.9 F | RESPIRATION RATE: 14 BRPM | WEIGHT: 249.4 LBS | DIASTOLIC BLOOD PRESSURE: 80 MMHG | SYSTOLIC BLOOD PRESSURE: 132 MMHG | OXYGEN SATURATION: 100 %

## 2024-04-12 DIAGNOSIS — K43.5 PARASTOMAL HERNIA WITHOUT OBSTRUCTION OR GANGRENE: Primary | ICD-10-CM

## 2024-04-12 RX ORDER — FLUCONAZOLE 100 MG/1
100 TABLET ORAL DAILY
Qty: 14 TABLET | Refills: 0 | Status: SHIPPED | OUTPATIENT
Start: 2024-04-12 | End: 2024-04-26 | Stop reason: SDUPTHER

## 2024-04-12 NOTE — PROGRESS NOTES
"Darwin Sanchez is a 78 y.o. male in for follow up of Parastomal hernia without obstruction or gangrene    History of Present Illness  The patient presents for evaluation of multiple medical concerns. He is accompanied by an adult female.    The patient's stoma, which was revised, is currently short and insufficiently protruding. Despite this, the accompanying adult female reports that the patient's condition is satisfactory. The patient's output is satisfactory. Previously, the stoma was healing well, but subsequently leaked, leading to a resurgence of the stoma. The patient is considering joining an ostomy support group and has consulted with ostomy nurses, but expresses a desire to explore alternative options.    The patient presents with a persistent rash, which is currently unmanageable. He has been utilizing powdered stoma powder for symptom management. The accompanying adult female reports that they have not been able to identify the effective treatment for the rash. The patient has not been using the spray prescribed for the rash. He has attempted to use Neema powder, clean, and dry the area with gloves, and avoids oil on his hands. He has been applying bacitracin to the area.    The patient reports experiencing low energy levels. He managed to walk slightly over 2 miles for 3 days this week, a deviation from his usual 2 to 3 walks.       /80   Pulse 64   Temp 96.9 °F (36.1 °C) (Temporal)   Resp 14   Ht 182.9 cm (72\")   Wt 113 kg (249 lb 6.4 oz)   SpO2 100%   BMI 33.82 kg/m²   Body mass index is 33.82 kg/m².      PE:   Physical Exam    Physical Exam  Constitutional:       General: He is not in acute distress.     Appearance: He is well-developed.   HENT:      Head: Normocephalic and atraumatic.   Abdominal:      General: There is no distension.      Palpations: Abdomen is soft.      Comments: Lower quadrant ostomy is pink patent and productive.  Skin around ostomy is irritated, erythematous " and some sutures have created a little irritation.  Those were removed.   Genitourinary:     Comments: Perineum when bears down small bowel herniates through the levators.  It is easily reduced.  Musculoskeletal:         General: Normal range of motion.   Neurological:      Mental Status: He is alert.   Psychiatric:         Thought Content: Thought content normal.             Assessment & Plan       1. Parastomal hernia without obstruction or gangrene       S/p parastomal hernia repair  The patient's sutures were successfully removed. A mild convex suture was applied with crusting, and a cohesive seal was applied to the surrounding area.  For the perineum hernia , I discussed with him that it is the much larger surgery and if he wants to pursue that we would need to talk with Dr. Dugan.  He has not been interested in it because of the complexity of the case and the significant recovery.  Follow-up  The patient is scheduled for a follow-up visit in 3 weeks.    Patient or patient representative verbalized consent for the use of Ambient Listening during the visit with  Makeda Ocasio MD for chart documentation. 4/26/2024  08:00 EDT

## 2024-04-25 NOTE — PROGRESS NOTES
"Darwin Sanchez is a 78 y.o. male in for follow up of parastomal hernia status post revision and repair with mesh placement on 3/19/24.    The patient presents with a peristomal wound that he is concerned about.  He was seen a couple of weeks ago by Dr. Ocasio; at that time, a couple of sutures were removed, and he was prescribed fluconazole.  He has been changing his ostomy appliance with the assistance of his son.  He states that his blood glucose has been under good control.  The last time that he was seen by the wound ostomy care nurses was prior to his March 2024 surgery.  He is considering a trip to [place] at the beginning of June but is hesitant due to the issues he continues to have with his ostomy.  He is struggling with some constipation and took a Dulcolax without a result.  He used the last of his Hydrofera Blue today.    /70 (BP Location: Left arm, Patient Position: Sitting, Cuff Size: Adult)   Pulse 54   Ht 182.9 cm (72\")   Wt 113 kg (249 lb 12.8 oz)   SpO2 99%   BMI 33.88 kg/m²   Body mass index is 33.88 kg/m².  -  Physical Exam  No acute distress  Abdomen: See clinical photo below    Parastomal skin irritation has improved since last visit with Dr. Oacsio. Wound superiomedial to stoma is slightly larger.    Assessment:   1. Colostomy care    - status post revision and repair with mesh placement on 3/19/24    Plan:  Recommend continuing Hydrofera Blue if can obtain more between now and seeing wound ostomy care nurse.  Our staff will call the wound ostomy care nurse office to see if they can get him Hydrofera Blue and an evaluation appointment soon.  I recommend MiraLAX for constipation  I refilled the fluconazole prescription  Follow-up in about 2 weeks with Dr. Ocasio for wound check    Jessenia Hines PA-C  Physician Assistant  Colorectal Surgery    "

## 2024-04-26 ENCOUNTER — OFFICE VISIT (OUTPATIENT)
Dept: SURGERY | Facility: CLINIC | Age: 79
End: 2024-04-26
Payer: MEDICARE

## 2024-04-26 ENCOUNTER — TELEPHONE (OUTPATIENT)
Dept: SURGERY | Facility: CLINIC | Age: 79
End: 2024-04-26
Payer: MEDICARE

## 2024-04-26 VITALS
HEIGHT: 72 IN | SYSTOLIC BLOOD PRESSURE: 110 MMHG | DIASTOLIC BLOOD PRESSURE: 70 MMHG | BODY MASS INDEX: 33.83 KG/M2 | HEART RATE: 54 BPM | WEIGHT: 249.8 LBS | OXYGEN SATURATION: 99 %

## 2024-04-26 DIAGNOSIS — Z43.3 COLOSTOMY CARE: Primary | ICD-10-CM

## 2024-04-26 RX ORDER — FLUCONAZOLE 100 MG/1
200 TABLET ORAL WEEKLY
Qty: 4 TABLET | Refills: 0 | Status: SHIPPED | OUTPATIENT
Start: 2024-04-26 | End: 2024-05-10

## 2024-04-26 NOTE — TELEPHONE ENCOUNTER
Pt was seen in office today. Jessenia put order in for referral. Pt is also out of hydrofera blue and she wanted to know if wound/ostomy dept could order more for him?

## 2024-05-01 ENCOUNTER — HOSPITAL ENCOUNTER (OUTPATIENT)
Dept: INFUSION THERAPY | Facility: HOSPITAL | Age: 79
Discharge: HOME | End: 2024-05-01
Admitting: PHYSICIAN ASSISTANT
Payer: MEDICARE

## 2024-05-01 PROCEDURE — G0463 HOSPITAL OUTPT CLINIC VISIT: HCPCS

## 2024-05-01 RX ORDER — NYSTATIN 100000 [USP'U]/G
POWDER TOPICAL
Qty: 60 G | Refills: 11 | Status: SHIPPED | OUTPATIENT
Start: 2024-05-01 | End: 2025-05-01

## 2024-05-01 NOTE — NURSING NOTE
"CWOCN- patient returns for outpatient appointment related to peristomal irritation and wound. Referral from Dr Makeda Ocasio. He was in Dr Ocasio's office last week. He has been treated with oral antifungal medication. The irritation is worse as is the wound. Patient reports that he continues to use the deep convex Coloplast with stomahesive paste. Daughter is present.    We removed his pouch- there is patchy redness related to a fungal rash and a the wound has returned. It appears to have more depth than last week. There is an indentation around the stoma from the deep convex appliance. Stoma is moist, red, budded, healthy.         Recommend to treat skin with nystatin powder and barrier spray. Recommend to reduce pressure around the stoma by decreasing the convexity.     We treated the skin with antifungal powder and no sting skin barrier. We continued hydrofera blue to the wound bed, securing with hydrocolloid. Placed a soft convex Coloplast appliance with barrier ring, using a small bead of andrew paste between the dressing and stoma.     [employer] has a new product that patient might like- it is a soft convex. Additionally, the andrew paste may do better than his stomahesive paste- it is less irritating on the skin. Will request samples.     Patient states \"I am ready to listen to you all and I am going to get rid of my deep convex appliances.\" He says he does not like when it leaks so he goes back to the deep, but he understands why it is important to stop using it.     Reviewed plan with patient and daughter. Notified Dr Ocasio of the above and request for nystatin powder.     Patient to call for his next appointment after he sees how this Coloplast pouch does and tries the [employer].       "

## 2024-05-02 ENCOUNTER — HOSPITAL ENCOUNTER (OUTPATIENT)
Facility: HOSPITAL | Age: 79
Discharge: HOME | End: 2024-05-02
Admitting: INTERNAL MEDICINE
Payer: MEDICARE

## 2024-05-02 DIAGNOSIS — Z85.048 HISTORY OF ANAL CANCER: ICD-10-CM

## 2024-05-02 DIAGNOSIS — R91.1 LUNG NODULE: ICD-10-CM

## 2024-05-02 PROCEDURE — 0 DIATRIZOATE MEGLUMINE & SODIUM PER 1 ML: Performed by: INTERNAL MEDICINE

## 2024-05-02 PROCEDURE — 25510000001 IOPAMIDOL 61 % SOLUTION: Performed by: INTERNAL MEDICINE

## 2024-05-02 PROCEDURE — 74177 CT ABD & PELVIS W/CONTRAST: CPT

## 2024-05-02 PROCEDURE — 71260 CT THORAX DX C+: CPT

## 2024-05-02 RX ADMIN — IOPAMIDOL 85 ML: 612 INJECTION, SOLUTION INTRAVENOUS at 13:57

## 2024-05-02 RX ADMIN — DIATRIZOATE MEGLUMINE AND DIATRIZOATE SODIUM 30 ML: 600; 100 SOLUTION ORAL; RECTAL at 12:45

## 2024-05-06 ENCOUNTER — OFFICE VISIT (OUTPATIENT)
Dept: SURGERY | Facility: CLINIC | Age: 79
End: 2024-05-06
Payer: MEDICARE

## 2024-05-06 VITALS
RESPIRATION RATE: 16 BRPM | WEIGHT: 252.9 LBS | BODY MASS INDEX: 34.25 KG/M2 | HEIGHT: 72 IN | OXYGEN SATURATION: 97 % | TEMPERATURE: 98.1 F | DIASTOLIC BLOOD PRESSURE: 78 MMHG | HEART RATE: 79 BPM | SYSTOLIC BLOOD PRESSURE: 132 MMHG

## 2024-05-06 DIAGNOSIS — K43.5 PARASTOMAL HERNIA WITHOUT OBSTRUCTION OR GANGRENE: ICD-10-CM

## 2024-05-06 DIAGNOSIS — N81.4 PELVIC FLOOR HERNIA: ICD-10-CM

## 2024-05-06 DIAGNOSIS — Z43.3 COLOSTOMY CARE: Primary | ICD-10-CM

## 2024-05-06 PROCEDURE — 3078F DIAST BP <80 MM HG: CPT | Performed by: COLON & RECTAL SURGERY

## 2024-05-06 PROCEDURE — 99024 POSTOP FOLLOW-UP VISIT: CPT | Performed by: COLON & RECTAL SURGERY

## 2024-05-06 PROCEDURE — 3075F SYST BP GE 130 - 139MM HG: CPT | Performed by: COLON & RECTAL SURGERY

## 2024-05-08 ENCOUNTER — OFFICE VISIT (OUTPATIENT)
Dept: ONCOLOGY | Facility: CLINIC | Age: 79
End: 2024-05-08
Payer: MEDICARE

## 2024-05-08 ENCOUNTER — LAB (OUTPATIENT)
Dept: LAB | Facility: HOSPITAL | Age: 79
End: 2024-05-08
Payer: MEDICARE

## 2024-05-08 VITALS
DIASTOLIC BLOOD PRESSURE: 86 MMHG | SYSTOLIC BLOOD PRESSURE: 133 MMHG | BODY MASS INDEX: 34.57 KG/M2 | HEIGHT: 72 IN | WEIGHT: 255.2 LBS | OXYGEN SATURATION: 99 % | TEMPERATURE: 98.1 F | RESPIRATION RATE: 18 BRPM | HEART RATE: 57 BPM

## 2024-05-08 DIAGNOSIS — R91.1 LUNG NODULE: ICD-10-CM

## 2024-05-08 DIAGNOSIS — Z85.048 HISTORY OF ANAL CANCER: Primary | ICD-10-CM

## 2024-05-08 DIAGNOSIS — Z85.048 HISTORY OF ANAL CANCER: ICD-10-CM

## 2024-05-08 PROCEDURE — 3075F SYST BP GE 130 - 139MM HG: CPT | Performed by: INTERNAL MEDICINE

## 2024-05-08 PROCEDURE — 1160F RVW MEDS BY RX/DR IN RCRD: CPT | Performed by: INTERNAL MEDICINE

## 2024-05-08 PROCEDURE — 1159F MED LIST DOCD IN RCRD: CPT | Performed by: INTERNAL MEDICINE

## 2024-05-08 PROCEDURE — 1125F AMNT PAIN NOTED PAIN PRSNT: CPT | Performed by: INTERNAL MEDICINE

## 2024-05-08 PROCEDURE — 99214 OFFICE O/P EST MOD 30 MIN: CPT | Performed by: INTERNAL MEDICINE

## 2024-05-08 PROCEDURE — 3079F DIAST BP 80-89 MM HG: CPT | Performed by: INTERNAL MEDICINE

## 2024-05-13 ENCOUNTER — TELEPHONE (OUTPATIENT)
Dept: SURGERY | Facility: CLINIC | Age: 79
End: 2024-05-13
Payer: MEDICARE

## 2024-05-13 NOTE — TELEPHONE ENCOUNTER
[facility] called requesting addendum to note on 4-26-24. They need location of ostomy and need a physical signature on note to process ostomy supplies.

## 2025-03-06 ENCOUNTER — OFFICE VISIT (OUTPATIENT)
Dept: ORTHOPEDIC SURGERY | Facility: CLINIC | Age: 80
End: 2025-03-06
Payer: MEDICARE

## 2025-03-06 VITALS — BODY MASS INDEX: 33.86 KG/M2 | TEMPERATURE: 97.3 F | HEIGHT: 72 IN | WEIGHT: 250 LBS

## 2025-03-06 DIAGNOSIS — Z96.651 S/P TKR (TOTAL KNEE REPLACEMENT), RIGHT: Primary | ICD-10-CM

## 2025-03-06 DIAGNOSIS — M17.12 PRIMARY OSTEOARTHRITIS OF LEFT KNEE: ICD-10-CM

## 2025-03-06 RX ORDER — METHYLPREDNISOLONE ACETATE 80 MG/ML
80 INJECTION, SUSPENSION INTRA-ARTICULAR; INTRALESIONAL; INTRAMUSCULAR; SOFT TISSUE
Status: COMPLETED | OUTPATIENT
Start: 2025-03-06 | End: 2025-03-06

## 2025-03-06 RX ORDER — LIDOCAINE HYDROCHLORIDE 10 MG/ML
5 INJECTION, SOLUTION EPIDURAL; INFILTRATION; INTRACAUDAL; PERINEURAL
Status: COMPLETED | OUTPATIENT
Start: 2025-03-06 | End: 2025-03-06

## 2025-03-06 RX ORDER — OLMESARTAN MEDOXOMIL 40 MG/1
1 TABLET ORAL DAILY
COMMUNITY
Start: 2025-01-21

## 2025-03-06 RX ORDER — COLCHICINE 0.6 MG/1
1 TABLET ORAL DAILY
COMMUNITY
Start: 2025-02-14

## 2025-03-06 RX ADMIN — METHYLPREDNISOLONE ACETATE 80 MG: 80 INJECTION, SUSPENSION INTRA-ARTICULAR; INTRALESIONAL; INTRAMUSCULAR; SOFT TISSUE at 10:51

## 2025-03-06 RX ADMIN — LIDOCAINE HYDROCHLORIDE 5 ML: 10 INJECTION, SOLUTION EPIDURAL; INFILTRATION; INTRACAUDAL; PERINEURAL at 10:51

## 2025-03-06 NOTE — PROGRESS NOTES
"Darwin Sanchez : 1945 MRN: [id number] DATE: 3/6/2025    Chief Complaint:  Follow up right total knee/primary osteoarthritis left knee    SUBJECTIVE:Patient returns today for a general follow up of right total knee replacement that was done by Dr. Tung ocampo in 2018 as well as known left knee osteoarthritis. Patient reports that his left knee hurts slightly worse than the right knee.  Patient reports that the right knee is a likely due to having to compensate for the arthritis he is having in his left knee.  Patient reports that his right knee is mechanically functioning well with no signs of instability noted.  Just having some mild discomfort around the medial and lateral joint line.  The left knee is the patient's primary cause of discomfort today.  Patient has known advanced osteoarthritis that we have been conservatively managing.  Patient reports the pain is about a 6 or 7 on a scale of 10 especially with ambulating.  Patient gets relief by resting and taking over-the-counter anti-inflammatory medicines.  He denies any instability or buckling issues.  Denies any sign or symptoms of infection, and is without any other significant complaints today.    OBJECTIVE:    Temp 97.3 °F (36.3 °C) (Temporal)   Ht 182.9 cm (72\")   Wt 113 kg (250 lb)   BMI 33.91 kg/m²   Family History   Problem Relation Age of Onset    Heart disease Mother     Hypertension Mother     Arthritis Mother     Osteoporosis Mother     Heart disease Father     Hypertension Father     Cancer Father         prostate.    Arthritis Father     Heart attack Father     Hyperlipidemia Father     Osteoporosis Father     Aneurysm Brother     Hypertension Brother     Cancer Brother     Lung cancer Brother     Heart disease Brother     Hypertension Brother     Malig Hyperthermia Neg Hx      Past Medical History:   Diagnosis Date    Abdominal hernia     Anal cancer     S/P CHEMO, XRT    Anxiety     Arthritis     Arthritis of back     " Arthritis of neck 2000    CAD (coronary artery disease)     Carotid artery occlusion 07/06/2019    Cervical disc disorder 1990    Chest pain 02/2017    CKD (chronic kidney disease) 04/15/2021    Condyloma     Dyspnea 04/2017    ED (erectile dysfunction)     Fish hook injury of finger 09/04/2017    RIGHT INDEX FINGER, SEEN AT [facility] ER    Gout     Gouty arthropathy 07/06/2019    Hearing loss 11/25/2020    BILATERAL HEARING AIDS    Hip arthrosis 1970    History of 2019 novel coronavirus disease (COVID-19) 03/2020    PER PCP-ANTIBODY TEST    History of chemotherapy 2021    FOLLOWED BY DR. JAYSON BAUTISTA    History of radiation therapy 2021    FOLLOWED BY DR. RIGO PIZARRO    Hyperlipidemia 07/06/2019    Hypersomnia     Hypertension     Impaired fasting glucose 07/06/2019    Inguinal hernia     Knee swelling 1970    Lumbosacral disc disease 1990    Lymph node enlargement 03/2021    Lymphadenopathy, inguinal 09/2021    RIGHT INGUINAL    Metastasis 2021    TO RIGHT INGUINAL LYMPH NODE, 1/6 NODES POSITIVE    Normocytic anemia 05/2021    OA (osteoarthritis)     MULTIPLE SITES    ELISHA (obstructive sleep apnea) 07/26/2021    Home sleep study.  Moderate ELISHA with AHI 23 events per hour.  No sleep-related hypoxia noted.    Osteoporosis     Psoriasis     Radiation dermatitis     Rectal bleeding     WOUND, TO HAVE BIOPSIED PER DR MARCH    Rheumatoid arthritis     SCC (squamous cell carcinoma) 04/2020    ANAL, S/P EXCISION AROUND 2013, HAD RADIATION AND CHEMO    Skin breakdown     AROUND STOMA, BLEEDING, RAW AREA    Syncope and collapse 04/15/2021    ADMITTED TO [facility]    Tear of meniscus of knee 1980    Tennis elbow 1980    Thoracic aortic aneurysm without rupture 03/20/2021    Thoracic disc disorder 1990    Uses hearing aid     BILATERAL    Wound dehiscence 04/15/2021     Past Surgical History:   Procedure Laterality Date    ABDOMINAL PERINEAL RESECTION N/A 04/06/2021    Procedure: ABDOMINAL PERINEAL RESECTION LAPAROSCOPIC WITH  JOHANAINCI ROBOT;  Surgeon: Makeda Ocasio MD;  Location: [facility] MAIN OR;  Service: DaVinci;  Laterality: N/A;    BREAST RECONSTRUCTION WITH LATISSIMUS MUSCLE FLAP AND IMPLANT INSERTION Bilateral 04/06/2021    Procedure: BILATERAL GLUTEAL ADVANCEMENT FLAP AND GRACILIS MUSCLE FLAP;  Surgeon: Mark Bardales MD;  Location: [facility] MAIN OR;  Service: Plastics;  Laterality: Bilateral;    COLONOSCOPY N/A 2018    COLOSTOMY      CONDYLOMA REMOVAL N/A 02/23/2021    Procedure: PERINEAL/ANAL LESION EXCISION/FULGURATION;  Surgeon: Makeda Ocasio MD;  Location: [facility] MAIN OR;  Service: General;  Laterality: N/A;    JOINT REPLACEMENT  2018    KNEE ARTHROSCOPY Bilateral     OVER 35 YRS AGO, DR. CACERES    LYMPH NODE DISSECTION Right 06/16/2021    RIGHT INGUINAL, 1/6 (+) NODES, DR. DK MARTINES AT [facility]    MUSCLE FLAP N/A 04/15/2021    Procedure: PERINEUM WOUND DEBRIDEMENT AND COMPLEX CLOSURE;  Surgeon: Mark Bardales MD;  Location: [facility] MAIN OR;  Service: Plastics;  Laterality: N/A;    RECTAL EXAMINATION UNDER ANESTHESIA N/A 03/09/2018    WITH BIOPSIES OF ANUS, DR. LINA ISIDRO AT [facility]    SKIN SURGERY  2013    GRAFTS    SOFT TISSUE BIOPSY Right 04/02/2021    RIGHT INGUINAL, (+) METASTASIS IN 1/6 NODES    SQUAMOUS CELL CARCINOMA EXCISION N/A 09/23/2016    DIETER RECTAL AREA, DR. LINA ISIDRO AT [facility]    TOTAL KNEE ARTHROPLASTY Right 10/17/2018    Procedure: TOTAL KNEE ARTHROPLASTY;  Surgeon: Cooper Ruby MD;  Location: [facility] OR;  Service: Orthopedics    TRIGGER POINT INJECTION  1980    VASCULAR SURGERY N/A 03/19/2024    Procedure: OPEN REVISION OF COLOSTOMY, REPAIR OF PARASTOMAL HERNIA.  LAPAROSCOPIC LYSIS OF ADHESIONS WITH DAVINCI ROBOT;  Surgeon: Makeda Ocasio MD;  Location: [facility] MAIN OR;  Service: Robotics - DaVinci;  Laterality: N/A;     Social History     Socioeconomic History    Marital status:      Spouse name: Mayra Fisher   Tobacco Use    Smoking status: Never     Passive  exposure: Never    Smokeless tobacco: Never   Vaping Use    Vaping status: Never Used   Substance and Sexual Activity    Alcohol use: No    Drug use: No    Sexual activity: Not Currently     Comment: .       Review of Systems: 14 point review of systems performed pertinent positives and negatives discussed above, all other systems are negative    Exam:. (Right knee) the incision is well healed. Range of motion is measured at 0 to 115. The calf is soft and nontender with a negative Homans sign. Alignment is neutral. Good quad strength. There is no evidence of varus/valgus or flexion instability. No effusion. Intact to light touch with palpable distal pulses.     Knee:  left    ALIGNMENT:     Valgus      GAIT:    Antalgic    SKIN:    No abnormality    RANGE OF MOTION:   0  -  95   DEG    STRENGTH:   4  / 5    LIGAMENTS:    No varus / valgus instability.   Negative  Lachman.    MENISCUS:     Negative   Odell       DISTAL PULSES:    Paplable    DISTAL SENSATION :   Intact    LYMPHATICS:     No   lymphadenopathy    OTHER:          - Positive   effusion      - Crepitance with ROM       DIAGNOSTIC STUDIES  Xrays: 3 views(AP bilateral knees, lateral right, and sunrise bilateral knees) were ordered and reviewed for evaluation of right knee replacement. They demonstrate a well positioned, well aligned knee replacement without complicating factors noted.  It is noted on the AP view that the patient has valgus alignment with moderate joint space narrowing to the lateral compartment with chondrocalcinosis noted to both the medial and lateral compartments.  The sunrise view shows the patient has joint space narrowing noted.  In comparison with previous films there has been no change noted to the right knee with further arthritic progression noted to the left knee..    ASSESSMENT:   follow up right knee replacement /primary osteoarthritis left knee       PLAN:      Treatment options as well as imaging results were  discussed in detail with the patient.  At this point in time again to proceed forward with conservative measures.  I do believe that the patient's right knee is likely compensating for the left knee and that is the source of his right knee discomfort.  I have instructed him on the RICE method to help with inflammation and swelling and applying topical anti-inflammatory cream.  Patient's left knee shows further arthritic progression.  I went to give him a prescription for physical therapy in which he wants to go [facility].  Will also give him an intra-articular joint injection today to calm his symptoms down.  He can follow back up with me on an as-needed basis.    Large Joint Arthrocentesis: L knee  Date/Time: 3/6/2025 10:51 AM  Consent given by: patient  Site marked: site marked  Timeout: Immediately prior to procedure a time out was called to verify the correct patient, procedure, equipment, support staff and site/side marked as required   Supporting Documentation  Indications: pain and joint swelling   Procedure Details  Location: knee - L knee  Preparation: Patient was prepped and draped in the usual sterile fashion  Needle size: 22 G  Approach: anterolateral  Medications administered: 5 mL lidocaine PF 1% 1 %; 80 mg methylPREDNISolone acetate 80 MG/ML  Patient tolerance: patient tolerated the procedure well with no immediate complications    (3 mL of lidocaine was used for anesthetic purposes)    AMANDO Watkins  3/6/2025

## 2025-03-28 ENCOUNTER — OFFICE VISIT (OUTPATIENT)
Dept: SURGERY | Facility: CLINIC | Age: 80
End: 2025-03-28
Payer: MEDICARE

## 2025-03-28 VITALS
DIASTOLIC BLOOD PRESSURE: 78 MMHG | BODY MASS INDEX: 34.5 KG/M2 | HEART RATE: 65 BPM | SYSTOLIC BLOOD PRESSURE: 120 MMHG | HEIGHT: 72 IN | OXYGEN SATURATION: 98 % | WEIGHT: 254.7 LBS

## 2025-03-28 DIAGNOSIS — K43.5 PARASTOMAL HERNIA WITHOUT OBSTRUCTION OR GANGRENE: ICD-10-CM

## 2025-03-28 DIAGNOSIS — N81.4 PELVIC FLOOR HERNIA: Primary | ICD-10-CM

## 2025-03-28 RX ORDER — DOXAZOSIN 2 MG/1
1 TABLET ORAL DAILY
COMMUNITY

## 2025-03-28 RX ORDER — ATORVASTATIN CALCIUM 80 MG/1
1 TABLET, FILM COATED ORAL DAILY
COMMUNITY
Start: 2025-01-21

## 2025-03-28 RX ORDER — ECONAZOLE NITRATE 10 MG/G
1 CREAM TOPICAL
COMMUNITY

## 2025-03-28 NOTE — PROGRESS NOTES
Darwin Sanchez is a 79 y.o. male in for follow up of Pelvic floor hernia    Parastomal hernia without obstruction or gangrene    History of Present Illness  The patient is a 79-year-old male who was seen 10 months ago for issues around his stoma. He was sent to wound ostomy. We talked about his pelvic floor hernia and parastomal hernia, which he would need to go to [facility] for reconstruction.    He reports an increase in the size of his hernia, both on his buttocks and abdomen. He has been experiencing constipation, which he initially managed with Ex-Lax but switched to MiraLAX due to concerns of dependency. This change has resulted in some improvement. He also reports a persistent sore on his buttocks, which causes discomfort when sitting. He believes the sore is exacerbated by the pressure from his hernia. He expresses a desire to avoid surgery and is considering weight loss as a potential solution to alleviate the pressure on his leg. He notes that the hernia retracts when he lies down but protrudes when he stands. He is obese and is contemplating whether losing 30 to 40 pounds could reduce the pressure on his leg. He also mentions a stoma that appears to be retracted on one side, causing difficulty in maintaining the attachment of the bag. If the bag detaches, stool can leak onto his skin within 30 minutes, causing irritation. A few weeks ago, the situation worsened significantly, but it has since improved. However, he still needs to change the bag multiple times daily. He has been referred to a specialist in [place] for assistance with adhesion and skin issues. He is curious about the possibility of pulling the stoma out further and is concerned about how to monitor the situation if no action is taken. He remains active and is interested in exploring non-surgical options for managing his condition. He also reports difficulty in applying medication to the affected area due to limited  "visibility.    MEDICATIONS  Current: MiraLAX  Past: Ex-Lax       /78 (BP Location: Left arm, Patient Position: Sitting, Cuff Size: Adult)   Pulse 65   Ht 182.9 cm (72\")   Wt 116 kg (254 lb 11.2 oz)   SpO2 98%   BMI 34.54 kg/m²   Body mass index is 34.54 kg/m².      PE:   Physical Exam    Physical Exam  Constitutional:       General: He is not in acute distress.     Appearance: He is well-developed.   HENT:      Head: Normocephalic and atraumatic.   Abdominal:      General: There is no distension.      Palpations: Abdomen is soft.      Comments: Llq - parastomal hernia    Genitourinary:     Comments: Perineum : small bowel herniated thru levators. reducible  Musculoskeletal:         General: Normal range of motion.   Neurological:      Mental Status: He is alert.   Psychiatric:         Thought Content: Thought content normal.       Assessment & Plan  1. Pelvic floor hernia.  The patient's pelvic floor hernia is contributing to his bowel issues and skin irritation around the stoma. He has been advised that weight loss, while beneficial for overall health, will not significantly impact the hernia. A referral to a colorectal surgeon at the [facility] has been made for further evaluation and potential surgical intervention. He has been instructed to apply triple antibiotic ointment or wound gel, which can be purchased over the counter, to the affected area daily for a minimum of 2 weeks. A Band-Aid should be used to cover the area to prevent friction.    2. Parastomal hernia.  The parastomal hernia is causing difficulty in keeping the stoma bag attached, leading to skin irritation and potential ulceration. The patient has been advised to continue using MiraLAX as needed for constipation relief. He has been referred to a wound ostomy nurse at [place] for better management of the ostomy. The patient has been instructed to apply triple antibiotic ointment or wound gel, which can be purchased over " the counter, to the affected area daily for a minimum of 2 weeks. A Band-Aid should be used to cover the area to prevent friction.     1. Pelvic floor hernia    2. Parastomal hernia without obstruction or gangrene         I spent 42 minutes caring for Darwin Sanchez on this date of service. This time includes time spent by me in the following activities:preparing for the visit, reviewing tests, obtaining and/or reviewing a separately obtained history, performing a medically appropriate examination and/or evaluation , counseling and educating the patient/family/caregiver, ordering medications, tests, or procedures, referring and communicating with other health care professionals  and independently interpreting results and communicating that information with the patient/family/caregiver    Patient or patient representative verbalized consent for the use of Ambient Listening during the visit with  Makeda Ocasio MD for chart documentation. 4/11/2025  17:31 EDT

## 2025-05-02 ENCOUNTER — HOSPITAL ENCOUNTER (OUTPATIENT)
Dept: CT IMAGING | Facility: HOSPITAL | Age: 80
Discharge: HOME | End: 2025-05-02
Payer: MEDICARE

## 2025-05-02 DIAGNOSIS — Z85.048 HISTORY OF ANAL CANCER: ICD-10-CM

## 2025-05-02 DIAGNOSIS — R91.1 LUNG NODULE: ICD-10-CM

## 2025-05-02 PROCEDURE — 25510000001 IOPAMIDOL 61 % SOLUTION: Performed by: INTERNAL MEDICINE

## 2025-05-02 PROCEDURE — 71260 CT THORAX DX C+: CPT

## 2025-05-02 PROCEDURE — 25510000002 DIATRIZOATE MEGLUMINE & SODIUM PER 1 ML: Performed by: INTERNAL MEDICINE

## 2025-05-02 PROCEDURE — 74177 CT ABD & PELVIS W/CONTRAST: CPT

## 2025-05-02 RX ORDER — DIATRIZOATE MEGLUMINE AND DIATRIZOATE SODIUM 660; 100 MG/ML; MG/ML
30 SOLUTION ORAL; RECTAL ONCE
Status: COMPLETED | OUTPATIENT
Start: 2025-05-02 | End: 2025-05-02

## 2025-05-02 RX ORDER — IOPAMIDOL 612 MG/ML
100 INJECTION, SOLUTION INTRAVASCULAR
Status: COMPLETED | OUTPATIENT
Start: 2025-05-02 | End: 2025-05-02

## 2025-05-02 RX ADMIN — IOPAMIDOL 85 ML: 612 INJECTION, SOLUTION INTRAVENOUS at 07:55

## 2025-05-02 RX ADMIN — DIATRIZOATE MEGLUMINE AND DIATRIZOATE SODIUM 30 ML: 660; 100 LIQUID ORAL; RECTAL at 06:55

## 2025-05-05 ENCOUNTER — TELEPHONE (OUTPATIENT)
Dept: ONCOLOGY | Facility: CLINIC | Age: 80
End: 2025-05-05
Payer: MEDICARE

## 2025-05-05 NOTE — TELEPHONE ENCOUNTER
"  Caller: Darwin Sanchez \"ÁNGELA\"    Relationship to patient: Self    Best call back number: [phone]     Chief complaint: PATIENT TO RESCHEDULE 5/7/25 APPT    Type of visit: VITALS AND FU1 (MAY NEED A LAB INSTEAD OF VITALS)    Requested date: PATIENT WILL BE OUT OF TOWN 5/27-6/6  "

## 2025-05-06 NOTE — PROGRESS NOTES
"[facility] GROUP OUTPATIENT FOLLOW UP CLINIC VISIT    REASON FOR FOLLOW-UP:    Metastatic anal cancer    HISTORY OF PRESENT ILLNESS:  Darwin Sanchez is a 79 y.o. male who returns today for follow up of the above issue.      He has worsening osteoarthritis pain of the left knee and requires a left knee replacement.  He also notes worsening parastomal hernia and he is also becoming more symptomatic from the pelvic floor hernia as well.    He saw Dr. Ocasio with colorectal surgery who referred him to the [facility].  Subsequently, the patient's daughter got him an appointment here with Dr. Reardon for further evaluation.  He does have an appointment at the [facility] in 2 weeks.    REVIEW OF SYSTEMS: As per the HPI    Vitals:    05/07/25 1051   BP: 145/88   Pulse: 64   Resp: 16   Temp: 97.5 °F (36.4 °C)   TempSrc: Oral   SpO2: 97%   Weight: 114 kg (250 lb 14.4 oz)   Height: 182.9 cm (72.01\")   PainSc: 5    PainLoc: Knee  Comment: left knee, hernia pain as well       PHYSICAL EXAMINATION:  General:  No acute distress, awake, alert and oriented  Skin:  Warm and dry, no visible rash  HEENT:  Normocephalic/atraumatic.   Chest:  Normal respiratory effort  Extremities:  No visible clubbing, cyanosis, or edema  Neuro/psych:  Grossly nonfocal.  Normal mood and affect.         DIAGNOSTIC DATA:  Ferritin (05/07/2025 11:52)  Iron Profile (05/07/2025 11:52)  Retic With IRF & RET-He (05/07/2025 11:52)  CBC & Differential (05/07/2025 11:52)  Comprehensive Metabolic Panel (05/07/2025 11:52)      IMAGING:   CT Abdomen Pelvis With Contrast (05/02/2025 07:54)  CT Chest With Contrast Diagnostic (05/02/2025 07:54)    CT images personally reviewed.  Enterocele within pelvic floor hernia.  Stable lymphadenopathy.  Stable parastomal hernia.      ASSESSMENT:  This is a 79 y.o. male with:    *Recurrent anal squamous cell carcinoma  History of anal cancer in 2013 treated with chemoradiation with Xeloda.  He had " significant skin toxicity with that treatment.  Chronic non-healing wound after that. Biopsies in 2016 and 2018 benign.   Worsening local changes since late 2020.  CT imaging 2/11/2021 with a sub-6 mm pulmonary nodule within the right lower lobe with mildly enlarged bilateral common iliac nodes.  Skin defect at the anus.  Surgical biopsy on 2/23/2021 with invasive moderately differentiated keratinizing squamous cell carcinoma.  PET scan on 3/25/2021 with hypermetabolic activity along the stable appearing linear tract of air in the subcutaneous tissues of the right gluteal crease.  1.1 cm right inguinal lymph node increased in size and intensely hypermetabolic.  Stable 3 mm right lower lobe pulmonary nodule.  Right inguinal lymph node biopsy on 4/2/2021 positive for metastatic squamous cell carcinoma.  Laparoscopic abdominal perineal resection on 4/6/2021 by Dr. Ocasio and muscle flap closure by Dr. Bardales with 4.2 cm invasive poorly differentiated focally keratinizing squamous cell carcinoma with ulceration and mixed inflammation.  No lymphovascular invasion.  12 benign lymph nodes.  Sigmoid colon unremarkable with 4 benign lymph nodes.  pT2, PN0.  He has a perineal wound that partially opened after a fall and was taken to the OR by Dr. Bardales for debridement and re-suture on 4/15.    He saw Dr. Zimmerman with surgical oncology.  Right inguinal node dissection on 6/16/2021 by Dr. Zimmerman.  1 of 6 lymph nodes with metastatic squamous cell carcinoma with the largest measuring 1.1 cm.  No extracapsular extension.  He was seen back on 6/29/2021 and offered adjuvant therapy with carboplatin and paclitaxel, which he declined.  He followed up with Dr. Ocasio on 7/19 with a satisfactory examination and no clinical evidence of recurrence.  CT imaging 10/6/21 without obvious recurrence. Small fluid collection in the right inguinal area with small adjacent nodes that warrant observation.  CT and pet imaging 8/15/2023 and  9/1/2023 without obvious recurrence.  Revision of colostomy, repair of parastomal hernia March 2024  CT 5/2/24 MOON  CT imaging 5/2/2025 without change    *He does have symptomatically enlarging parastomal and pelvic floor herniation.  He saw Dr. Ocasio for this and was referred to the [facility] for specialized evaluation.  Subsequently, the patient's daughter got him an appointment with Dr. Reardon here.    *Small right lower lobe pulmonary nodule  CT imaging on 8/15/2023 with an 8 mm subpleural nodule in the medial right lower lobe, unchanged and not FDG avid on PET imaging 9/1/2023.  Stable on follow-up imaging     *Normocytic anemia  Hemoglobin on 3/21/2024 was 10.9  Hemoglobin today 12.5.  Ferritin 51.2, iron 43 with 12% iron saturation.  His creatinine is 1.44.      *Mild right lower extremity edema  A result of right inguinal node dissection.  This remains stable.    *Worsening left knee pain, likely requiring arthroplasty    PLAN:  Laboratory anemia evaluation today  I encouraged him to follow-up with [facility] colorectal surgery for his hernias  No objection to left total knee arthroplasty if and when necessary  Follow-up CT chest abdomen and pelvis in 1 year and I will see him back after that with labs for follow-up    I spent 45 minutes in this visit today reviewing his record, communicating with him, coordinating care, placing orders, documenting encounter.

## 2025-05-07 ENCOUNTER — LAB (OUTPATIENT)
Dept: LAB | Facility: HOSPITAL | Age: 80
End: 2025-05-07
Payer: MEDICARE

## 2025-05-07 ENCOUNTER — OFFICE VISIT (OUTPATIENT)
Dept: ONCOLOGY | Facility: CLINIC | Age: 80
End: 2025-05-07
Payer: MEDICARE

## 2025-05-07 ENCOUNTER — APPOINTMENT (OUTPATIENT)
Dept: LAB | Facility: HOSPITAL | Age: 80
End: 2025-05-07
Payer: MEDICARE

## 2025-05-07 VITALS
TEMPERATURE: 97.5 F | HEART RATE: 64 BPM | BODY MASS INDEX: 33.98 KG/M2 | SYSTOLIC BLOOD PRESSURE: 145 MMHG | DIASTOLIC BLOOD PRESSURE: 88 MMHG | WEIGHT: 250.9 LBS | RESPIRATION RATE: 16 BRPM | HEIGHT: 72 IN | OXYGEN SATURATION: 97 %

## 2025-05-07 DIAGNOSIS — D64.9 NORMOCYTIC ANEMIA: ICD-10-CM

## 2025-05-07 DIAGNOSIS — Z85.048 HISTORY OF ANAL CANCER: Primary | ICD-10-CM

## 2025-05-07 LAB
ALBUMIN SERPL-MCNC: 4.2 G/DL (ref 3.5–5.2)
ALBUMIN/GLOB SERPL: 1.2 G/DL
ALP SERPL-CCNC: 98 U/L (ref 39–117)
ALT SERPL W P-5'-P-CCNC: 14 U/L (ref 1–41)
ANION GAP SERPL CALCULATED.3IONS-SCNC: 11.5 MMOL/L (ref 5–15)
AST SERPL-CCNC: 20 U/L (ref 1–40)
BASOPHILS # BLD AUTO: 0.05 10*3/MM3 (ref 0–0.2)
BASOPHILS NFR BLD AUTO: 0.8 % (ref 0–1.5)
BILIRUB SERPL-MCNC: 0.6 MG/DL (ref 0–1.2)
BUN SERPL-MCNC: 30 MG/DL (ref 8–23)
BUN/CREAT SERPL: 20.8 (ref 7–25)
CALCIUM SPEC-SCNC: 9.2 MG/DL (ref 8.6–10.5)
CHLORIDE SERPL-SCNC: 104 MMOL/L (ref 98–107)
CO2 SERPL-SCNC: 26.5 MMOL/L (ref 22–29)
CREAT SERPL-MCNC: 1.44 MG/DL (ref 0.76–1.27)
DEPRECATED RDW RBC AUTO: 46.2 FL (ref 37–54)
EGFRCR SERPLBLD CKD-EPI 2021: 50 ML/MIN/1.73
EOSINOPHIL # BLD AUTO: 0.12 10*3/MM3 (ref 0–0.4)
EOSINOPHIL NFR BLD AUTO: 1.8 % (ref 0.3–6.2)
ERYTHROCYTE [DISTWIDTH] IN BLOOD BY AUTOMATED COUNT: 14.4 % (ref 12.3–15.4)
FERRITIN SERPL-MCNC: 51.2 NG/ML (ref 30–400)
GLOBULIN UR ELPH-MCNC: 3.5 GM/DL
GLUCOSE SERPL-MCNC: 126 MG/DL (ref 65–99)
HCT VFR BLD AUTO: 39.8 % (ref 37.5–51)
HGB BLD-MCNC: 12.5 G/DL (ref 13–17.7)
HGB RETIC QN AUTO: 32.1 PG (ref 29.8–36.1)
IMM GRANULOCYTES # BLD AUTO: 0.02 10*3/MM3 (ref 0–0.05)
IMM GRANULOCYTES NFR BLD AUTO: 0.3 % (ref 0–0.5)
IMM RETICS NFR: 6.8 % (ref 3–15.8)
IRON 24H UR-MRATE: 43 MCG/DL (ref 59–158)
IRON SATN MFR SERPL: 12 % (ref 20–50)
LYMPHOCYTES # BLD AUTO: 1.03 10*3/MM3 (ref 0.7–3.1)
LYMPHOCYTES NFR BLD AUTO: 15.7 % (ref 19.6–45.3)
MCH RBC QN AUTO: 27.7 PG (ref 26.6–33)
MCHC RBC AUTO-ENTMCNC: 31.4 G/DL (ref 31.5–35.7)
MCV RBC AUTO: 88.1 FL (ref 79–97)
MONOCYTES # BLD AUTO: 0.51 10*3/MM3 (ref 0.1–0.9)
MONOCYTES NFR BLD AUTO: 7.8 % (ref 5–12)
NEUTROPHILS NFR BLD AUTO: 4.84 10*3/MM3 (ref 1.7–7)
NEUTROPHILS NFR BLD AUTO: 73.6 % (ref 42.7–76)
NRBC BLD AUTO-RTO: 0 /100 WBC (ref 0–0.2)
PLATELET # BLD AUTO: 173 10*3/MM3 (ref 140–450)
PMV BLD AUTO: 10.2 FL (ref 6–12)
POTASSIUM SERPL-SCNC: 4 MMOL/L (ref 3.5–5.2)
PROT SERPL-MCNC: 7.7 G/DL (ref 6–8.5)
RBC # BLD AUTO: 4.52 10*6/MM3 (ref 4.14–5.8)
RETICS # AUTO: 0.06 10*6/MM3 (ref 0.02–0.13)
RETICS/RBC NFR AUTO: 1.33 % (ref 0.7–1.9)
SODIUM SERPL-SCNC: 142 MMOL/L (ref 136–145)
TIBC SERPL-MCNC: 347 MCG/DL (ref 298–536)
TRANSFERRIN SERPL-MCNC: 233 MG/DL (ref 200–360)
VIT B12 BLD-MCNC: 310 PG/ML (ref 211–946)
WBC NRBC COR # BLD AUTO: 6.57 10*3/MM3 (ref 3.4–10.8)

## 2025-05-07 PROCEDURE — 85046 RETICYTE/HGB CONCENTRATE: CPT | Performed by: INTERNAL MEDICINE

## 2025-05-07 PROCEDURE — 83540 ASSAY OF IRON: CPT | Performed by: INTERNAL MEDICINE

## 2025-05-07 PROCEDURE — 36415 COLL VENOUS BLD VENIPUNCTURE: CPT | Performed by: INTERNAL MEDICINE

## 2025-05-07 PROCEDURE — 82728 ASSAY OF FERRITIN: CPT | Performed by: INTERNAL MEDICINE

## 2025-05-07 PROCEDURE — 85025 COMPLETE CBC W/AUTO DIFF WBC: CPT | Performed by: INTERNAL MEDICINE

## 2025-05-07 PROCEDURE — 82607 VITAMIN B-12: CPT | Performed by: INTERNAL MEDICINE

## 2025-05-07 PROCEDURE — 84466 ASSAY OF TRANSFERRIN: CPT | Performed by: INTERNAL MEDICINE

## 2025-05-07 PROCEDURE — 80053 COMPREHEN METABOLIC PANEL: CPT | Performed by: INTERNAL MEDICINE

## 2025-05-08 LAB
FOLATE BLD-MCNC: 420 NG/ML
FOLATE RBC-MCNC: 1022 NG/ML
HCT VFR BLD AUTO: 41.1 % (ref 37.5–51)

## 2025-05-12 LAB
ALBUMIN SERPL ELPH-MCNC: 3.5 G/DL (ref 2.9–4.4)
ALBUMIN/GLOB SERPL: 1 {RATIO} (ref 0.7–1.7)
ALPHA1 GLOB SERPL ELPH-MCNC: 0.3 G/DL (ref 0–0.4)
ALPHA2 GLOB SERPL ELPH-MCNC: 0.8 G/DL (ref 0.4–1)
B-GLOBULIN SERPL ELPH-MCNC: 1 G/DL (ref 0.7–1.3)
GAMMA GLOB SERPL ELPH-MCNC: 1.6 G/DL (ref 0.4–1.8)
GLOBULIN SER-MCNC: 3.8 G/DL (ref 2.2–3.9)
IGA SERPL-MCNC: 130 MG/DL (ref 61–437)
IGG SERPL-MCNC: 1803 MG/DL (ref 603–1613)
IGM SERPL-MCNC: 122 MG/DL (ref 15–143)
INTERPRETATION SERPL IEP-IMP: ABNORMAL
KAPPA LC FREE SER-MCNC: 40.9 MG/L (ref 3.3–19.4)
KAPPA LC FREE/LAMBDA FREE SER: 1.57 {RATIO} (ref 0.26–1.65)
LABORATORY COMMENT REPORT: ABNORMAL
LAMBDA LC FREE SERPL-MCNC: 26.1 MG/L (ref 5.7–26.3)
M PROTEIN SERPL ELPH-MCNC: ABNORMAL G/DL
PROT SERPL-MCNC: 7.3 G/DL (ref 6–8.5)

## 2025-05-16 ENCOUNTER — OFFICE VISIT (OUTPATIENT)
Dept: SURGERY | Facility: CLINIC | Age: 80
End: 2025-05-16
Payer: MEDICARE

## 2025-05-16 VITALS
WEIGHT: 249 LBS | BODY MASS INDEX: 33.72 KG/M2 | HEIGHT: 72 IN | SYSTOLIC BLOOD PRESSURE: 142 MMHG | DIASTOLIC BLOOD PRESSURE: 80 MMHG | OXYGEN SATURATION: 99 % | HEART RATE: 63 BPM

## 2025-05-16 DIAGNOSIS — K43.5 PARASTOMAL HERNIA WITHOUT OBSTRUCTION OR GANGRENE: Primary | ICD-10-CM

## 2025-05-16 DIAGNOSIS — K45.8 HERNIA OF PELVIC FLOOR: ICD-10-CM

## 2025-05-16 PROCEDURE — 1160F RVW MEDS BY RX/DR IN RCRD: CPT | Performed by: SURGERY

## 2025-05-16 PROCEDURE — 1159F MED LIST DOCD IN RCRD: CPT | Performed by: SURGERY

## 2025-05-16 PROCEDURE — 3079F DIAST BP 80-89 MM HG: CPT | Performed by: SURGERY

## 2025-05-16 PROCEDURE — 3077F SYST BP >= 140 MM HG: CPT | Performed by: SURGERY

## 2025-05-16 PROCEDURE — 99204 OFFICE O/P NEW MOD 45 MIN: CPT | Performed by: SURGERY

## 2025-05-21 NOTE — PROGRESS NOTES
General Surgery  Initial Office Visit    CC: Parastomal hernia, pelvic floor hernia    HPI: The patient is a pleasant 79 y.o. year-old gentleman who presents today for evaluation of 2 separate hernias, one along the left lower quadrant abdominal wall adjacent to his permanent colostomy and a second hernia along his pelvic floor that protrudes out to the perineum.  He has a complicated past medical and past surgical history involving anal squamous cell carcinoma diagnosed by Dr. Farhan Kelsey in 2013 requiring surgical excisional biopsy followed by chemotherapy and radiation.  The radiation was particularly hard on him and he had severe skin ulceration and nonhealing wounds for a few years along the entire perineum and perianal region.  Eventually, he was diagnosed with recurrent anal squamous cell carcinoma after his anal wounds failed to heal with multiple noninvasive maneuvers and he saw Dr. Makeda Ocasio for a second opinion.  She performed an excisional biopsy in February 2021 revealing multifocal invasive squamous cell carcinoma.  He then underwent a robotic abdominal perineal resection with gracilis muscle flap closure of his perineum by Dr. Eddie Bardales in April 2021.  This was complicated by a syncopal episode while in the shower causing his perineal wound to dehisce.  He then underwent a complex perineal closure again by Dr. Bardales less than 2 weeks later.  A right inguinal lymph node biopsy in April 2021 also showed metastatic squamous cell carcinoma, so he underwent completion right inguinal lymphadenectomy by Dr. Jayro Zimmerman in June 2021. He developed a parastomal hernia along the left lower abdomen shortly after his APR and underwent an open revision of his colostomy with primary repair of his parastomal hernia by Dr. Makeda Ocasio in March 2024.  He had almost an immediate recurrence of the parastomal hernia and now has a large protuberant bulge surrounding his left lower quadrant colostomy  that makes it difficult to keep a good seal on his colostomy pouch.  He has also in the last year noticed a bulge within his perineum that protrudes outward but is always reducible when he lays flat.  Neither of his hernias seem to limit his quality of life much other than the troubles with pouching around the colostomy.  He met with Dr. Makeda Ocasio to discuss both of his hernias and was referred to [facility] colorectal surgery to coordinate a parastomal hernia repair as well as a complex plastic surgery repair of his perineum.  I am unsure if he ever met with anyone at [facility] but called my office for second opinion regarding both hernias.    Past Medical History:   History of anal squamous cell carcinoma with recurrence  Obstructive sleep apnea  Hypertension  Hyperlipidemia  Gout  Coronary artery disease  Erectile dysfunction  Osteoporosis  Osteoarthritis  Rheumatoid arthritis    Past Surgical History:   Open colostomy revision with primary repair of parastomal hernia (March 2024, Dr. Makeda Ocasio)  Right inguinal lymphadenectomy (June 2021, Dr. Zimmerman)  Perineal wound debridement with complex closure for wound dehiscence (April 2021, Dr. Bardales)  Abdominal perineal resection with bilateral gluteal advancement flaps and gracilis muscle flap (April 2021, Dr. Makeda Ocasio and Dr. Bardales)  Excisional biopsy anal condyloma (February 2021, Dr. Makeda Ocasio)  Right knee arthroplasty  Excision squamous cell carcinoma (2016, Dr. Kelsey at [facility])    Medications:   Atorvastatin 80 mg daily  Chlorthalidone 25 mg daily  Clobetasol cream twice daily as needed  Clonidine 0.2 mg twice daily  Colchicine 0.6 mg daily  Doxazosin 2 mg daily  Spectazole 1% cream topical as needed  Indomethacin 75 mg twice daily  Olmesartan 40 mg daily  Ambien 5 mg nightly as needed for insomnia    Allergies: Carvedilol, hydrocodone    Family History: Mother with history of coronary artery disease, father  with history of prostate cancer, brother with history of lung cancer    Social History: , non-smoker, no regular alcohol use    ROS:   Constitutional: Negative for fevers or chills  HENT: Negative for hearing loss or runny nose  Eyes: Negative for vision changes or scleral icterus  Respiratory: Negative for cough or shortness of breath  Cardiovascular: Negative for chest pain or heart palpitations  Gastrointestinal: Negative for abdominal distension, nausea, vomiting, constipation, melena, or hematochezia  Genitourinary: Negative for hematuria or dysuria  Musculoskeletal: Negative for joint swelling or gait instability  Neurologic: Negative for tremors or seizures  Psychiatric: Negative for suicidal ideations or agitation  All other systems reviewed and negative    Physical Exam:  Height: 182 cm  Weight: 113 kg  BMI: 33.77  General: No acute distress, well-nourished & well-developed  HEAD: normocephalic, atraumatic  EYES: normal conjunctiva, sclera anicteric  EARS: grossly normal hearing  NECK: supple, no thyromegaly  CARDIOVASCULAR: regular rate and rhythm  RESPIRATORY: clear to auscultation bilaterally  GASTROINTESTINAL: soft, nontender, non-distended, protuberant and chronically incarcerated parastomal hernia of the left mid abdomen around his colostomy that somewhat reduces in size when he lays flat but is not completely reducible, the colostomy itself is pink and viable with soft brown stool in the bag, well-healed laparoscopic surgical scars from previous surgeries  GENITOURINARY: He has well-healed scars of the perineum, with a tiny ulcerated wound that is shallow measuring about 1 cm in length by 5 mm in width with no hypertrophied dysplastic tissue around the edges and this appears to be a standard pressure ulcer, there is a soft and reducible but large pelvic floor hernia palpable within his perineum at the site of skin breakdown  MUSCULOSKELETAL: normal gait and station. No gross extremity  abnormalities  PSYCHIATRIC: oriented x3, normal mood and affect    IMAGING:  CT CHEST/ABDOMEN/PELVIS (5/2/2025):  FINDINGS:  CHEST:  1. The previously described pleural-based nodule at the medial aspect of the right lower lobe has a variable appearance depending on the degree of lung inflation. Currently the lungs are very underinflated with crowded lung markings. The nodule in question is adjacent to dependent atelectasis and pleural parenchymal thickening on series 3 image 78. The previously noted stable sub-6 mm pulmonary nodule at the dependent aspect of the left lung base is not present on the current examination. Given constraints of crowded lung markings, no definite new lung opacities are seen.  2. There are no pleural or pericardial effusions. There is no lymphadenopathy. Stable unremarkable thyroid.   ABDOMEN/PELVIS:  1. There is no interval change in the appearance of the pelvic floor hernia containing a large enterocele. There is no acute bowel abnormality. Left mid abdominal colostomy and parastomal hernia containing fat appear stable. No colitis, bowel ileus, or obstruction.  2. Stable 1.5 x 1.0 cm distal left periaortic node and multiple shotty and borderline enlarged nodes along the common iliac vessels are stable. The largest measures 1.6 x 1.5 cm on the right, image 151.  3. Stable single 2.8 cm gallstone within a contracted gallbladder. Stable appearance of the thickened contracted wall on multiple prior CTs. No acute abnormality at the liver, pancreas, spleen, adrenals, or kidneys. Stable 1.3 cm and 0.9 cm mildly complex right renal cysts.   4. 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    ASSESSMENT & PLAN  Mr. Sanchez is a 79-year-old gentleman with a complicated past medical and past surgical history now with chronic incarcerated parastomal hernia (containing only omental fat) and a chronic but reducible pelvic  floor hernia following a remote abdominal perineal resection with complex muscle flap closure by colorectal surgery and plastic surgery.  I reviewed his most recent CT of the chest, abdomen, and pelvis (images and report) and showed he and his daughter the images today.  I suspect the parastomal hernia would be amenable to a laparoscopic [place] repair.  Unfortunately, the pelvic floor hernia is extremely complex and difficult to repair given his previous pelvic radiation and already having undergone a complex muscle flap closure at the time of his APR.  Although this hernia is minimally symptomatic, any attempt at repair of his more symptomatic parastomal hernia would lead to increased intra-abdominal pressure which will further worsen his minimal skin breakdown of the perineum and cause catastrophic small bowel dehiscence through the skin.  For that reason, I would not recommend fixing the parastomal hernia unless something could also be done about the pelvic floor hernia.  I think given the complexity of his scenario, he would be best suited at a complex hernia repair institution like [facility].  I have recommended this to the patient and his daughter today and they are open to meeting with one of the complex hernia repair surgeons at the [facility].  I have sent a referral to Dr. Niko Hugo.  I also discussed his case with Dr. Makeda Ocasio who is in agreement with my plan.  He can see me back on an as-needed basis.    Lili Reardon MD  General, Robotic, and Endoscopic Surgery  [facility]    [address], [address]  [place] [address]  P: [phone]  F: [phone]

## 2025-05-28 ENCOUNTER — TELEPHONE (OUTPATIENT)
Dept: SURGERY | Facility: CLINIC | Age: 80
End: 2025-05-28
Payer: MEDICARE

## 2025-05-28 NOTE — TELEPHONE ENCOUNTER
Pt says he was referred to [facility] by you to see Dr. Niko Hugo. 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 Pt wonders if there is someone else he should see at [facility] or if he should try to fine  in [place].Pt prefers a call back from .

## 2025-05-30 NOTE — TELEPHONE ENCOUNTER
Spoke with patient and informed him of message from Dr. Reardon.  He voiced understanding, and stated he would like to [facility] in [place].

## (undated) DEVICE — SPNG GZ WOVN 4X4IN 12PLY 10/BX STRL

## (undated) DEVICE — DRAPE,REIN 53X77,STERILE: Brand: MEDLINE

## (undated) DEVICE — APPL CHLORAPREP HI/LITE 26ML ORNG

## (undated) DEVICE — GOWN,NON-REINFORCED,SIRUS,SET IN SLV,XL: Brand: MEDLINE

## (undated) DEVICE — MARKR SKIN W/RULR AND LBL

## (undated) DEVICE — TROC BLADLES ANCHORPORT/OPTI LP 8X120MM 1P/U

## (undated) DEVICE — PREMIUM WET SKIN PREP TRAY: Brand: MEDLINE INDUSTRIES, INC.

## (undated) DEVICE — TOWEL,OR,DSP,ST,BLUE,STD,4/PK,20PK/CS: Brand: MEDLINE

## (undated) DEVICE — NDL HYPO ECLPS SFTY 22G 1 1/2IN

## (undated) DEVICE — TTL1LYR 16FR10ML 100%SIL TMPST TR: Brand: MEDLINE

## (undated) DEVICE — ENDOPATH XCEL BLADELESS TROCARS WITH STABILITY SLEEVES: Brand: ENDOPATH XCEL

## (undated) DEVICE — PK KN TOTL 40

## (undated) DEVICE — GOWN,SIRUS,NON REINFRCD,LARGE,SET IN SL: Brand: MEDLINE

## (undated) DEVICE — JACKSON-PRATT 100CC BULB RESERVOIR: Brand: CARDINAL HEALTH

## (undated) DEVICE — BLD TONG INDIV/WRP A/ 6IN STRL

## (undated) DEVICE — REDUCER: Brand: ENDOWRIST

## (undated) DEVICE — ST TBG AIRSEAL FLTR TRI LUM

## (undated) DEVICE — DRAPE,UTILITY,TAPE,15X26,STERILE: Brand: MEDLINE

## (undated) DEVICE — GOWN,SURGICAL,AURORA,SLEEVE: Brand: MEDLINE

## (undated) DEVICE — GLV SURG SENSICARE PI LF PF 7.5 GRN STRL

## (undated) DEVICE — UNDERCAST PADDING: Brand: DEROYAL

## (undated) DEVICE — APPL DURAPREP IODOPHOR APL 26ML

## (undated) DEVICE — TIP COVER ACCESSORY

## (undated) DEVICE — SOL NACL 0.9PCT 1000ML

## (undated) DEVICE — SUT MNCRYL PLS ANTIB UD 4/0 PS2 18IN

## (undated) DEVICE — ELECTRD BLD EZ CLN STD 2.5IN

## (undated) DEVICE — NEEDLE, QUINCKE 22GX3.5": Brand: MEDLINE INDUSTRIES, INC.

## (undated) DEVICE — DRAPE,UNDERBUTTOCKS,PCH,STERILE: Brand: MEDLINE

## (undated) DEVICE — LEGGINGS, PAIR, 31X48, STERILE: Brand: MEDLINE

## (undated) DEVICE — DISPOSABLE GRASPER CARTRIDGE: Brand: DIRECT DRIVE REPOSABLE GRASPERS

## (undated) DEVICE — SUT ETHIB 0/0 CT1 30IN X424H

## (undated) DEVICE — STPLR SKIN VISISTAT WD 35CT

## (undated) DEVICE — SEAL

## (undated) DEVICE — STERILE LATEX POWDER-FREE SURGICAL GLOVESWITH NITRILE COATING: Brand: PROTEXIS

## (undated) DEVICE — SUT SILK 2/0 FS BLK 18IN 685G

## (undated) DEVICE — 3M™ STERI-DRAPE™ INSTRUMENT POUCH 1018L: Brand: STERI-DRAPE™

## (undated) DEVICE — DRSNG WND BORDR/ADHS NONADHR/GZ LF 4X4IN STRL

## (undated) DEVICE — GLV SURG BIOGEL LTX PF 8 1/2

## (undated) DEVICE — SYR LUERLOK 30CC

## (undated) DEVICE — GLV SURG SENSICARE W/ALOE PF LF 7.5 STRL

## (undated) DEVICE — BLADELESS OBTURATOR: Brand: WECK VISTA

## (undated) DEVICE — DEV COND GAS LAP INSUFLOW W/LUER CONN

## (undated) DEVICE — COLUMN DRAPE

## (undated) DEVICE — GLV SURG SENSICARE W/ALOE PF LF 8 STRL

## (undated) DEVICE — NDL SPINE 22G 31/2IN BLK

## (undated) DEVICE — ENCORE® LATEX ORTHO SIZE 7.5, STERILE LATEX POWDER-FREE SURGICAL GLOVE: Brand: ENCORE

## (undated) DEVICE — SOL NACL 0.9PCT 100ML SGL

## (undated) DEVICE — PENCL E/S ULTRAVAC TELESCP NOSE HOLSTR 10FT

## (undated) DEVICE — ELECTRD BLD EZ CLN MOD XLNG 2.75IN

## (undated) DEVICE — ADHS LIQ MASTISOL 2/3ML

## (undated) DEVICE — SUT MNCRYL 3/0 PS2 18IN Y497G

## (undated) DEVICE — LOU MINOR PROCEDURE: Brand: MEDLINE INDUSTRIES, INC.

## (undated) DEVICE — GOWN SURG AERO CHROME XL

## (undated) DEVICE — ANTIBACTERIAL UNDYED BRAIDED (POLYGLACTIN 910), SYNTHETIC ABSORBABLE SUTURE: Brand: COATED VICRYL

## (undated) DEVICE — COLOSTOMY/ILEOSTOMY KIT, FLEXWEAR: Brand: NEW IMAGE

## (undated) DEVICE — DRSNG PAD ABD 8X10IN STRL

## (undated) DEVICE — 3M™ IOBAN™ 2 ANTIMICROBIAL INCISE DRAPE 6648EZ: Brand: IOBAN™ 2

## (undated) DEVICE — ENSEAL TRIO TEMPERATURE CONTOLLED TISSUE SEALING TECHNOLOGY DISPOSABLE TISSUE SEALING DEVICE TAPTRONIC TRIGGER ACTIVATED POWER 3MM CURVED JAW: Brand: ENSEAL

## (undated) DEVICE — LEGGINGS, PAIR, CLEAR, STERILE: Brand: MEDLINE

## (undated) DEVICE — COVER,MAYO STAND,XL,STERILE: Brand: MEDLINE

## (undated) DEVICE — SUT VIC 1 CT1 36IN J947H

## (undated) DEVICE — CANNULA SEAL

## (undated) DEVICE — ADHS SKIN SURG TISS VISC PREMIERPRO EXOFIN HI/VISC FAST/DRY

## (undated) DEVICE — DRSNG WND GZ CURAD OIL EMULSION 3X8IN LF STRL 1PK

## (undated) DEVICE — TOTAL TRAY, 16FR 10ML SIL FOLEY, URN: Brand: MEDLINE

## (undated) DEVICE — ENDOPATH PNEUMONEEDLE INSUFFLATION NEEDLES WITH LUER LOCK CONNECTORS 120MM: Brand: ENDOPATH

## (undated) DEVICE — TRAP FLD MINIVAC MEGADYNE 100ML

## (undated) DEVICE — SUT VIC 0 TIES 18IN J912G

## (undated) DEVICE — BNDG ELAS ELITE V/CLOSE 6IN 5YD LF STRL

## (undated) DEVICE — 3M™ IOBAN™ 2 ANTIMICROBIAL INCISE DRAPE 6640EZ: Brand: IOBAN™ 2

## (undated) DEVICE — PREP SOL POVIDONE/IODINE BT 4OZ

## (undated) DEVICE — LAPAROVUE VISIBILITY SYSTEM LAPAROSCOPIC SOLUTIONS: Brand: LAPAROVUE

## (undated) DEVICE — SUT VIC 0 CT1 36IN J946H

## (undated) DEVICE — STAPLER 60: Brand: SUREFORM

## (undated) DEVICE — TUBING, SUCTION, 1/4" X 20', STRAIGHT: Brand: MEDLINE INDUSTRIES, INC.

## (undated) DEVICE — MAT FLR ABSORBENT LG 4FT 10 2.5FT

## (undated) DEVICE — ARM DRAPE

## (undated) DEVICE — MEDI-VAC YANKAUER SUCTION HANDLE W/BULBOUS TIP: Brand: CARDINAL HEALTH

## (undated) DEVICE — LOU GENERAL ROBOT: Brand: MEDLINE INDUSTRIES, INC.

## (undated) DEVICE — IRRIGATOR BULB ASEPTO 60CC STRL

## (undated) DEVICE — DRSNG WND BORDR/ADHS NONADHR/GZ LF 4X14IN STRL

## (undated) DEVICE — PROXIMATE RH ROTATING HEAD SKIN STAPLERS (35 WIDE) CONTAINS 35 STAINLESS STEEL STAPLES: Brand: PROXIMATE

## (undated) DEVICE — DUAL CUT SAGITTAL BLADE

## (undated) DEVICE — GLV SURG SENSICARE MICRO PF LF 7 STRL

## (undated) DEVICE — SUT VIC 0 CT1 CR8 18IN DYED J740D

## (undated) DEVICE — COVER,TABLE,44X90,STERILE: Brand: MEDLINE

## (undated) DEVICE — DRSNG SURESITE WNDW 4X4.5

## (undated) DEVICE — SPNG LAP 18X18IN LF STRL PK/5

## (undated) DEVICE — SYR LL 3CC

## (undated) DEVICE — SUT PDS 2/0 CT1 27IN DYED Z339H

## (undated) DEVICE — VIOLET BRAIDED (POLYGLACTIN 910), SYNTHETIC ABSORBABLE SUTURE: Brand: COATED VICRYL

## (undated) DEVICE — SOL ANTISTICK CAUTRY ELECTROLUBE LF

## (undated) DEVICE — VESSEL SEALER EXTEND: Brand: ENDOWRIST

## (undated) DEVICE — COUNT NDL MAG FM/BLCK 40COUNT

## (undated) DEVICE — SUT VIC 2/0 UR6 27IN J602H

## (undated) DEVICE — Device

## (undated) DEVICE — BANDAGE,GAUZE,BULKEE II,4.5"X4.1YD,STRL: Brand: MEDLINE

## (undated) DEVICE — SMOKE EVACUATION TUBING WITH 8 IN INTEGRAL WAND AND SPONGE GUARD: Brand: BUFFALO FILTER

## (undated) DEVICE — GLV SURG SENSICARE PI PF LF 7 GRN STRL